# Patient Record
Sex: FEMALE | Race: WHITE | NOT HISPANIC OR LATINO | Employment: OTHER | URBAN - METROPOLITAN AREA
[De-identification: names, ages, dates, MRNs, and addresses within clinical notes are randomized per-mention and may not be internally consistent; named-entity substitution may affect disease eponyms.]

---

## 2017-01-03 DIAGNOSIS — E03.9 HYPOTHYROIDISM, UNSPECIFIED TYPE: ICD-10-CM

## 2017-01-03 LAB
T4 FREE SERPL-MCNC: 1.05 NG/DL (ref 0.76–1.46)
TSH SERPL DL<=0.005 MIU/L-ACNC: 5.85 MU/L (ref 0.4–4)

## 2017-01-03 PROCEDURE — 36415 COLL VENOUS BLD VENIPUNCTURE: CPT | Performed by: NURSE PRACTITIONER

## 2017-01-03 PROCEDURE — 84443 ASSAY THYROID STIM HORMONE: CPT | Performed by: NURSE PRACTITIONER

## 2017-01-03 PROCEDURE — 84439 ASSAY OF FREE THYROXINE: CPT | Performed by: NURSE PRACTITIONER

## 2017-01-03 NOTE — Clinical Note
[facility]  [address] [place] [address]                  [phone]   January 5, 2017    Vanessa Mckeon  [address]  [place] [address]      Vanessa    Your thyroid results show a marked improvement, but still not within a therapeutic range. I would recommend we increase the dose of your synthroid dose once again and recheck in another 6-8 wks. I have faxed over a new prescription to your pharmacy and I'd like you to schedule a lab only in about 6-8 wks. You can schedule a nonfasting lab only appointment any day of the week except Sundays.  Call our  at [phone] to set up this appointment.    Please contact me if you have any questions,  Tatiana Leahy CNP        Results for orders placed or performed in visit on 01/03/17   TSH with free T4 reflex   Result Value Ref Range    TSH 5.85 (H) 0.40 - 4.00 mU/L   T4 free   Result Value Ref Range    T4 Free 1.05 0.76 - 1.46 ng/dL

## 2017-01-05 RX ORDER — LEVOTHYROXINE SODIUM 75 UG/1
75 TABLET ORAL DAILY
Qty: 60 TABLET | Refills: 0 | Status: SHIPPED | OUTPATIENT
Start: 2017-01-05 | End: 2017-03-07

## 2017-01-19 ENCOUNTER — TRANSFERRED RECORDS (OUTPATIENT)
Dept: HEALTH INFORMATION MANAGEMENT | Facility: CLINIC | Age: 62
End: 2017-01-19

## 2017-02-03 ENCOUNTER — TELEPHONE (OUTPATIENT)
Dept: PEDIATRICS | Facility: CLINIC | Age: 62
End: 2017-02-03

## 2017-02-03 DIAGNOSIS — M25.562 LEFT KNEE PAIN, UNSPECIFIED CHRONICITY: Primary | ICD-10-CM

## 2017-02-03 NOTE — TELEPHONE ENCOUNTER
Patient called back I gave her the phone number off of the referral, and she said she will call them

## 2017-02-03 NOTE — TELEPHONE ENCOUNTER
Patient asking for referral to see specialist for water on the knee that she feels happened as a result of a fall a few months ago, advise if she should be seen in clinic first or ok with referral to specialist,sasha.

## 2017-02-14 ENCOUNTER — OFFICE VISIT (OUTPATIENT)
Dept: ORTHOPEDICS | Facility: CLINIC | Age: 62
End: 2017-02-14
Payer: OTHER GOVERNMENT

## 2017-02-14 ENCOUNTER — RADIANT APPOINTMENT (OUTPATIENT)
Dept: GENERAL RADIOLOGY | Facility: CLINIC | Age: 62
End: 2017-02-14
Attending: PHYSICAL MEDICINE & REHABILITATION
Payer: OTHER GOVERNMENT

## 2017-02-14 VITALS
HEIGHT: 62 IN | SYSTOLIC BLOOD PRESSURE: 128 MMHG | WEIGHT: 187 LBS | DIASTOLIC BLOOD PRESSURE: 84 MMHG | BODY MASS INDEX: 34.41 KG/M2

## 2017-02-14 DIAGNOSIS — M17.11 PRIMARY OSTEOARTHRITIS OF RIGHT KNEE: ICD-10-CM

## 2017-02-14 DIAGNOSIS — E66.9 NON MORBID OBESITY, UNSPECIFIED OBESITY TYPE: ICD-10-CM

## 2017-02-14 DIAGNOSIS — M70.41 PREPATELLAR BURSITIS OF RIGHT KNEE: Primary | ICD-10-CM

## 2017-02-14 DIAGNOSIS — M25.561 ARTHRALGIA OF RIGHT LOWER LEG: ICD-10-CM

## 2017-02-14 PROCEDURE — 73562 X-RAY EXAM OF KNEE 3: CPT | Mod: RT | Performed by: PHYSICAL MEDICINE & REHABILITATION

## 2017-02-14 PROCEDURE — 99243 OFF/OP CNSLTJ NEW/EST LOW 30: CPT | Performed by: PHYSICAL MEDICINE & REHABILITATION

## 2017-02-14 NOTE — PROGRESS NOTES
[facility]   Clinic Visit s Feb 14, 2017    Subjective:  Vanessa Mckeon is a 61 year old female who is seen in consultation at the request of Ms. Leahy for evaluation of right knee pain/swelling.    Symptoms began 1 month ago.  Describes injury as occurring from falling in November 2016 injuring her right ankle at that time.  Reports right knee pain that is located anterior with radiation absent.  Pain is 3/10 in maximal severity and 0/10 currently.  Symptoms are worse with prolonged standing, sitting activities, and with getting out of her car and better with rest and elevation.  Other treatment has consisted of ice and Aleve with good relief.  Denies any weakness, locking, popping, catching, clicking, or bruising of the right knee.  Notes swelling of the right knee. Denies any fevers or chills. Denies any redness or warmth of the right knee.  Denies any previous right knee injuries/surgeries.      Patient's past medical, surgical, social, and family histories are reviewed today.  Significant medical history includes obesity  Past Medical History   Diagnosis Date     Anemia      Benign neoplasm of colon      Gastro-oesophageal reflux disease      Hypertrophy of breast      fibrous left breast- benign     Malignant neoplasm (H)      Pure hypercholesterolemia      Skin cancer        Review of Systems:  Constitutional: NEGATIVE for fever, chills, or change in weight  Skin: NEGATIVE for worrisome rashes, moles, or lesions  Neuro: NEGATIVE for weakness of the right knee  MSK: see HPI  Additional 10 point ROS is negative other than symptoms noted above and in HPI    This document serves as a record of the services and decisions personally performed and made by Chaz Singleton DO. It was created on his behalf by Zbigniew Edwards, a trained medical scribe. The creation of this document is based on the provider's statements to the medical scribe.  Zbigniew Edwards February 14, 2017 8:53 AM   "    Objective:  /84 (BP Location: Right arm, Patient Position: Chair, Cuff Size: Adult Regular)  Ht 1.575 m (5' 2\")  Wt 84.8 kg (187 lb)  LMP 05/19/2008  BMI 34.2 kg/m2  General: healthy, alert, obese, and in no distress  Skin: no suspicious lesions or rashes  Psych: mentation appears normal and affect normal/bright  HEENT: no scleral icterus  CV: no pedal edema  Resp: normal respiratory effort without conversational dyspnea   Neuro: motor strength as noted below  Lymph: no palpable lymphadenopathy    MSK:    RIGHT KNEE  Inspection:     Swelling present without erythema or ecchymosis  Palpation:    Not tender over the patellar tendon, distal quadriceps insertion, prepatellar bursa, medial joint line, lateral joint line, medial tibial plateau, lateral tibial plateau, medial femoral condyle, lateral femoral condyle, or pes bursa    Patellofemoral crepitus is present  Active Range of Motion:     00 extension to 1250 flexion  Strength:    Quadriceps: 5/5     Extensor mechanism intact  Special Tests:    Positive: Ballottement    Negative: Patellar grind, patellar apprehension, quadriceps active test , Zen's, bounce home, Lachman's, MCL/valgus stress (0 & 30 deg), and LCL/varus stress (0 & 30 deg)    Imaging:  Right knee x-rays (3 views, Persaud/sunrise/lateral) were ordered, independent visualization of images was performed, and interpreted in the office today  Impression:  1. Tricompartmental osteoarthrosis noted bilaterally with osteophytes present.  2. Ossifications noted lateral to the lateral femoral condyle of the left knee of unknown significance.  3. Soft tissue swelling noted anterior to the right patella, possibly related to prepatellar bursitis.  4. Enthesophyte noted of the superior portion of the right patella.  5. Probable joint effusion present of the right knee.  6. Negative for fracture, subluxation, or other acute osseous abnormalities.    ASSESSMENT:  1. Prepatellar bursitis of the right " knee  2. Primary right knee osteoarthrosis  3. Non morbid obesity    PLAN:  1. Acetaminophen/Ibuprofen/ice as needed for improved pain control.  2. Activity modification as discussed, including limitation of activities that cause pain/discomfort.  3. Discussed importance of weight loss, including decreased oral intake and increased physical activity including aqua therapy, biking activities as tolerated, etc.  4. Call if interested in a spider pad knee brace and/or right knee prepatellar bursa aspiration/corticosteroid injection with ultrasound guidance for further treatment purposes.  5. Follow-up as needed for further evaluation/medical care.  Instructed to follow-up if change of symptoms arise.  Consider right knee intra-articular aspiration/corticosteroid injection with ultrasound guidance, referral to orthopedic surgery, etc as deemed appropriate moving forward.  Instructed to contact our office should the condition evolve or worsen.    Patient's conditions were thoroughly discussed during today's visit with greater than 50% of the visit spent counseling the patient with total time spent face-to-face with the patient being 15 minutes.    Disclaimer: This note consists of symbols derived from keyboarding, dictation and/or voice recognition software. As a result, there may be errors in the script that have gone undetected. Please consider this when interpreting information found in this chart.    The information in this document, created by a scribe for me, accurately reflects the services I personally performed and the decisions made by me.  I have reviewed and approved this document for accuracy.    Chaz Singleton DO, CAM  [facility]

## 2017-02-14 NOTE — PATIENT INSTRUCTIONS
We addressed the following today:    1. Prepatellar right knee bursitis  2. Right knee arthritis  3. Obesity    Activity modification as discussed    Topical Treatments: Ice    Over the counter medication: Acetaminophen (Tylenol) 1000 mg every 6 hours with food (Maximum of 3000 mg/day)    Ibuprofen (Advil) maximum of 800 mg four times a day with food    Discussed importance of weight loss, including decreased oral intake and increased physical activity including aqua therapy, biking activities as tolerated, etc    Call if interested in a spider pad knee brace and/or right knee aspiration/corticosteroid injection with ultrasound guidance for further treatment purposes    Follow-up as needed for further evaluation/medical care (call direct clinic number [[phone]] at any time with questions or concerns)

## 2017-02-14 NOTE — NURSING NOTE
"Chief Complaint   Patient presents with     Knee Pain     Right Knee       Initial /84  Ht 5' 2\" (1.575 m)  Wt 187 lb (84.8 kg)  LMP 05/19/2008  BMI 34.2 kg/m2 Estimated body mass index is 34.2 kg/(m^2) as calculated from the following:    Height as of this encounter: 5' 2\" (1.575 m).    Weight as of this encounter: 187 lb (84.8 kg).  Medication Reconciliation: complete     Monica Steen, ATC      "

## 2017-02-14 NOTE — MR AVS SNAPSHOT
After Visit Summary   2/14/2017    Vanessa Mckeon    MRN: [id number]           Patient Information     Date Of Birth          1955        Visit Information        Provider Department      2/14/2017 8:40 AM Chaz Singleton DO [facility]        Today's Diagnoses     Prepatellar bursitis of right knee    -  1    Primary osteoarthritis of right knee        Non morbid obesity, unspecified obesity type          Care Instructions    We addressed the following today:    1. Prepatellar bursitis  2. Right knee arthritis  2. Obesity    Activity modification as discussed    Topical Treatments: Ice    Over the counter medication: Acetaminophen (Tylenol) 1000 mg every 6 hours with food (Maximum of 3000 mg/day)    Ibuprofen (Advil) maximum of 800 mg four times a day with food    Discussed importance of weight loss, including decreased oral intake and increased physical activity including aqua therapy, biking activities as tolerated, etc    Call if interested in a spider pad knee brace and/or right knee aspiration/corticosteroid injection with ultrasound guidance for further treatment purposes    Follow-up as needed for further evaluation/medical care (call direct clinic number [[phone]] at any time with questions or concerns)        Follow-ups after your visit        Who to contact     If you have questions or need follow up information about today's clinic visit or your schedule please contact [facility] directly at [phone].  Normal or non-critical lab and imaging results will be communicated to you by MyChart, letter or phone within 4 business days after the clinic has received the results. If you do not hear from us within 7 days, please contact the clinic through MyChart or phone. If you have a critical or abnormal lab result, we will notify you by phone as soon as possible.  Submit refill requests through [employer] or call your pharmacy and they will  "forward the refill request to us. Please allow 3 business days for your refill to be completed.          Additional Information About Your Visit        [employer] Information     [employer] lets you send messages to your doctor, view your test results, renew your prescriptions, schedule appointments and more. To sign up, go to [email or web address] . Click on \"Log in\" on the left side of the screen, which will take you to the Welcome page. Then click on \"Sign up Now\" on the right side of the page.     You will be asked to enter the access code listed below, as well as some personal information. Please follow the directions to create your username and password.     Your access code is: [email or web address]  Expires: 5/15/2017  9:19 AM     Your access code will  in 90 days. If you need help or a new code, please call your [facility] clinic or [phone].        Care EveryWhere ID     This is your Care EveryWhere ID. This could be used by other organizations to access your [facility] medical records  [id number]        Your Vitals Were     Height Last Period BMI (Body Mass Index)             1.575 m (5' 2\") 2008 34.2 kg/m2          Blood Pressure from Last 3 Encounters:   17 128/84   16 122/66   16 118/78    Weight from Last 3 Encounters:   17 84.8 kg (187 lb)   16 85.1 kg (187 lb 9.6 oz)   16 84.6 kg (186 lb 9.6 oz)               Primary Care Provider Office Phone # Fax #    LENARD Soria [phone]       [facility] [address] [place] [address]        Thank you!     Thank you for choosing [facility]  for your care. Our goal is always to provide you with excellent care. Hearing back from our patients is one way we can continue to improve our services. Please take a few minutes to complete the written survey that you may receive in the mail after your visit with us. Thank you!             Your Updated Medication List " - Protect others around you: Learn how to safely use, store and throw away your medicines at www.disposemymeds.org.          This list is accurate as of: 2/14/17  9:19 AM.  Always use your most recent med list.                   Brand Name Dispense Instructions for use    cyclobenzaprine 5 MG tablet    FLEXERIL    30 tablet    Take 1 tablet (5 mg) by mouth 3 times daily as needed       fluticasone 50 MCG/ACT spray    FLONASE    1 Package    Spray 2 sprays into both nostrils daily       * levothyroxine 50 MCG tablet    SYNTHROID/LEVOTHROID    60 tablet    Take 1 tablet (50 mcg) by mouth daily       * levothyroxine 75 MCG tablet    SYNTHROID/LEVOTHROID    60 tablet    Take 1 tablet (75 mcg) by mouth daily       MULTIVITAMIN PO      Take  by mouth.       omeprazole 20 MG CR capsule    priLOSEC    90 capsule    Take 1 capsule (20 mg) by mouth daily       order for DME     1 each    Equipment being ordered: ankle brace       * Notice:  This list has 2 medication(s) that are the same as other medications prescribed for you. Read the directions carefully, and ask your doctor or other care provider to review them with you.

## 2017-02-14 NOTE — Clinical Note
Dear Vanessa Baptiste saw me at [facility] on Feb 14, 2017.  Please refer to the visit note at your convenience and feel free to contact me should you have any questions.  Sincerely,  Chaz Singleton DO, [facility]

## 2017-02-28 ENCOUNTER — TRANSFERRED RECORDS (OUTPATIENT)
Dept: HEALTH INFORMATION MANAGEMENT | Facility: CLINIC | Age: 62
End: 2017-02-28

## 2017-03-01 DIAGNOSIS — E03.9 HYPOTHYROIDISM, UNSPECIFIED TYPE: ICD-10-CM

## 2017-03-01 LAB — TSH SERPL DL<=0.005 MIU/L-ACNC: 2.97 MU/L (ref 0.4–4)

## 2017-03-01 PROCEDURE — 36415 COLL VENOUS BLD VENIPUNCTURE: CPT | Performed by: NURSE PRACTITIONER

## 2017-03-01 PROCEDURE — 84443 ASSAY THYROID STIM HORMONE: CPT | Performed by: NURSE PRACTITIONER

## 2017-03-07 DIAGNOSIS — E03.9 HYPOTHYROIDISM, UNSPECIFIED TYPE: ICD-10-CM

## 2017-03-07 RX ORDER — LEVOTHYROXINE SODIUM 75 UG/1
75 TABLET ORAL DAILY
Qty: 90 TABLET | Refills: 2 | Status: SHIPPED | OUTPATIENT
Start: 2017-03-07 | End: 2017-12-29

## 2017-03-07 NOTE — TELEPHONE ENCOUNTER
Prescription approved per [facility] Refill Protocol.  Jo Ann Brown PharmD   [facility]  [email or web address]   [facility] pharmacist on behalf of [facility].

## 2017-03-07 NOTE — TELEPHONE ENCOUNTER
Levothyroxine 75mcg     Last Written Prescription Date: 1/5/17  Last Quantity: 60, # refills: 0  Last Office Visit with [facility], [facility] or [facility] prescribing provider: 11/28/16        TSH   Date Value Ref Range Status   03/01/2017 2.97 0.40 - 4.00 mU/L Final       Thank You,  Angelica Lackey, [facility]

## 2017-05-22 DIAGNOSIS — K21.9 GASTROESOPHAGEAL REFLUX DISEASE WITHOUT ESOPHAGITIS: ICD-10-CM

## 2017-05-22 NOTE — TELEPHONE ENCOUNTER
OMEPRAZOLE 20MG      Last Written Prescription Date: 4/22/2016  Last Fill Quantity: 90,  # refills: 3   Last Office Visit with [facility], [facility] or [facility] prescribing provider: 11/28/2016

## 2017-05-23 NOTE — TELEPHONE ENCOUNTER
Prescription approved per [facility] Refill Protocol.    Mariel Lozano RN

## 2017-07-12 ENCOUNTER — OFFICE VISIT (OUTPATIENT)
Dept: URGENT CARE | Facility: URGENT CARE | Age: 62
End: 2017-07-12
Payer: OTHER GOVERNMENT

## 2017-07-12 VITALS
SYSTOLIC BLOOD PRESSURE: 137 MMHG | TEMPERATURE: 97.9 F | DIASTOLIC BLOOD PRESSURE: 85 MMHG | BODY MASS INDEX: 33.73 KG/M2 | WEIGHT: 184.4 LBS | HEART RATE: 87 BPM

## 2017-07-12 DIAGNOSIS — R30.0 DYSURIA: Primary | ICD-10-CM

## 2017-07-12 LAB
ALBUMIN UR-MCNC: NEGATIVE MG/DL
APPEARANCE UR: CLEAR
BACTERIA #/AREA URNS HPF: ABNORMAL /HPF
BILIRUB UR QL STRIP: NEGATIVE
COLOR UR AUTO: YELLOW
GLUCOSE UR STRIP-MCNC: NEGATIVE MG/DL
HGB UR QL STRIP: NEGATIVE
KETONES UR STRIP-MCNC: NEGATIVE MG/DL
LEUKOCYTE ESTERASE UR QL STRIP: ABNORMAL
NITRATE UR QL: NEGATIVE
NON-SQ EPI CELLS #/AREA URNS LPF: ABNORMAL /LPF
PH UR STRIP: 5.5 PH (ref 5–7)
RBC #/AREA URNS AUTO: ABNORMAL /HPF (ref 0–2)
SP GR UR STRIP: 1.01 (ref 1–1.03)
URN SPEC COLLECT METH UR: ABNORMAL
UROBILINOGEN UR STRIP-ACNC: 0.2 EU/DL (ref 0.2–1)
WBC #/AREA URNS AUTO: ABNORMAL /HPF (ref 0–2)

## 2017-07-12 PROCEDURE — 99213 OFFICE O/P EST LOW 20 MIN: CPT | Performed by: FAMILY MEDICINE

## 2017-07-12 PROCEDURE — 81001 URINALYSIS AUTO W/SCOPE: CPT | Performed by: FAMILY MEDICINE

## 2017-07-12 PROCEDURE — 87086 URINE CULTURE/COLONY COUNT: CPT | Performed by: FAMILY MEDICINE

## 2017-07-12 RX ORDER — SULFAMETHOXAZOLE/TRIMETHOPRIM 800-160 MG
1 TABLET ORAL 2 TIMES DAILY
Qty: 14 TABLET | Refills: 0 | Status: SHIPPED | OUTPATIENT
Start: 2017-07-12 | End: 2017-07-19

## 2017-07-12 NOTE — MR AVS SNAPSHOT
After Visit Summary   7/12/2017    Vanessa Mckeon    MRN: [id number]           Patient Information     Date Of Birth          1955        Visit Information        Provider Department      7/12/2017 9:35 AM Yrn Sparks MD [facility]        Today's Diagnoses     Dysuria    -  1      Care Instructions    Take full course of antibiotic for bladder infection/early kidney infection.    We will contact you if the urine culture results shows that a different antibiotic is needed for treatment.      Urinary Tract Infections in Women    Urinary tract infections (UTIs) are most often caused by bacteria (germs). These bacteria enter the urinary tract. The bacteria may come from outside the body. Or they may travel from the skin outside the rectum or vagina into the urethra. Female anatomy makes it easy for bacteria from the bowel to enter a woman s urinary tract, which is the most common source of UTI. This means women develop UTIs more often than men. Pain in or around the urinary tract is a common UTI symptom. But the only way to know for sure if you have a UTI for the healthcare provider to test your urine. The two tests that may be done are the urinalysis and urine culture.  Types of UTIs    Cystitis: A bladder infection (cystitis) is the most common UTI in women. You may have urgent or frequent urination. You may also have pain, burning when you urinate, and bloody urine.    Urethritis: This is an inflamed urethra, which is the tube that carries urine from the bladder to outside the body. You may have lower stomach or back pain. You may also have urgent or frequent urination.    Pyelonephritis: This is a kidney infection. If not treated, it can be serious and damage your kidneys. In severe cases, you may be hospitalized. You may have a fever and lower back pain.  Medicines to treat a UTI  Most UTIs are treated with antibiotics. These kill the bacteria. The length of time you need to  take them depends on the type of infection. It may be as short as 3 days. If you have repeated UTIs, a low-dose antibiotic may be needed for several months. Take antibiotics exactly as directed. Don t stop taking them until all of the medicine is gone. If you stop taking the antibiotic too soon, the infection may not go away, and you may develop a resistance to the antibiotic. This can make it much harder to treat.  Lifestyle changes to treat and prevent UTIs  The lifestyle changes below will help get rid of your UTI. They may also help prevent future UTIs.    Drink plenty of fluids. This includes water, juice, or other caffeine-free drinks. Fluids help flush bacteria out of your body.    Empty your bladder. Always empty your bladder when you feel the urge to urinate. And always urinate before going to sleep. Urine that stays in your bladder can lead to infection. Try to urinate before and after sex as well.    Practice good personal hygiene. Wipe yourself from front to back after using the toilet. This helps keep bacteria from getting into the urethra.    Use condoms during sex. These help prevent UTIs caused by sexually transmitted bacteria. Also, avoid using spermicides during sex. These can increase the risk of UTIs. Choose other forms of birth control instead. For women who tend to get UTIs after sex, a low-dose of a preventive antibiotic may be used. Be sure to discuss this option with your healthcare provider.    Follow up with your healthcare provider as directed. He or she may test to make sure the infection has cleared. If needed, more treatment may be started.  Date Last Reviewed: 1/1/2017 2000-2017 [employer]. [address] [place] [address], [place] [address]. All rights reserved. This information is not intended as a substitute for professional medical care. Always follow your healthcare professional's instructions.                Follow-ups after your visit        Who to contact     If you  "have questions or need follow up information about today's clinic visit or your schedule please contact [facility] directly at [phone].  Normal or non-critical lab and imaging results will be communicated to you by MyChart, letter or phone within 4 business days after the clinic has received the results. If you do not hear from us within 7 days, please contact the clinic through [employer] or phone. If you have a critical or abnormal lab result, we will notify you by phone as soon as possible.  Submit refill requests through [employer] or call your pharmacy and they will forward the refill request to us. Please allow 3 business days for your refill to be completed.          Additional Information About Your Visit        [employer] Information     [employer] lets you send messages to your doctor, view your test results, renew your prescriptions, schedule appointments and more. To sign up, go to [email or web address] . Click on \"Log in\" on the left side of the screen, which will take you to the Welcome page. Then click on \"Sign up Now\" on the right side of the page.     You will be asked to enter the access code listed below, as well as some personal information. Please follow the directions to create your username and password.     Your access code is: [email or web address]  Expires: 10/10/2017 10:07 AM     Your access code will  in 90 days. If you need help or a new code, please call your [facility] [place] clinic or [phone].        Care EveryWhere ID     This is your Care EveryWhere ID. This could be used by other organizations to access your [facility] medical records  [id number]        Your Vitals Were     Pulse Temperature Last Period BMI (Body Mass Index)          87 97.9  F (36.6  C) (Tympanic) 2008 33.73 kg/m2         Blood Pressure from Last 3 Encounters:   17 137/85   17 128/84   16 122/66    Weight from Last 3 Encounters:   17 184 lb 6.4 oz (83.6 kg)   17 187 lb (84.8 " kg)   11/28/16 187 lb 9.6 oz (85.1 kg)              We Performed the Following     UA reflex to Microscopic and Culture     Urine Culture Aerobic Bacterial     Urine Microscopic          Today's Medication Changes          These changes are accurate as of: 7/12/17 10:07 AM.  If you have any questions, ask your nurse or doctor.               Start taking these medicines.        Dose/Directions    sulfamethoxazole-trimethoprim 800-160 MG per tablet   Commonly known as:  BACTRIM DS/SEPTRA DS   Used for:  Dysuria   Started by:  Yrn Sparks MD        Dose:  1 tablet   Take 1 tablet by mouth 2 times daily for 7 days   Quantity:  14 tablet   Refills:  0            Where to get your medicines      These medications were sent to [facility] - [address]   [address] [place], [place] [address]     Phone:  [phone]     sulfamethoxazole-trimethoprim 800-160 MG per tablet                Primary Care Provider Office Phone # Fax #    LENARD Soria [phone]       [facility] [address] [place] [address] [address] [place] [address]        Equal Access to Services     [facility]: Hadii aad ku hadasho Soomaali, waaxda luqadaha, qaybta kaalmada adeegyada, waxvianney carrasco . So [facility] [place] [phone].    ATENCIÓN: Si habla español, tiene a persaud disposición servicios gratuitos de asistencia lingüística. Arabella al [phone].    We comply with applicable federal civil rights laws and [place] laws. We do not discriminate on the basis of race, color, national origin, age, disability sex, sexual orientation or gender identity.            Thank you!     Thank you for choosing [facility]  for your care. Our goal is always to provide you with excellent care. Hearing back from our patients is one way we can continue to improve our services. Please take a few minutes to complete the written survey that you may  receive in the mail after your visit with us. Thank you!             Your Updated Medication List - Protect others around you: Learn how to safely use, store and throw away your medicines at www.disposemymeds.org.          This list is accurate as of: 7/12/17 10:07 AM.  Always use your most recent med list.                   Brand Name Dispense Instructions for use Diagnosis    fluticasone 50 MCG/ACT spray    FLONASE    1 Package    Spray 2 sprays into both nostrils daily    Rhinitis       levothyroxine 75 MCG tablet    SYNTHROID/LEVOTHROID    90 tablet    Take 1 tablet (75 mcg) by mouth daily    Hypothyroidism, unspecified type       MULTIVITAMIN PO      Take  by mouth.        omeprazole 20 MG CR capsule    priLOSEC    90 capsule    TAKE ONE CAPSULE BY MOUTH EVERY DAY    Gastroesophageal reflux disease without esophagitis       sulfamethoxazole-trimethoprim 800-160 MG per tablet    BACTRIM DS/SEPTRA DS    14 tablet    Take 1 tablet by mouth 2 times daily for 7 days    Dysuria

## 2017-07-12 NOTE — PATIENT INSTRUCTIONS
Take full course of antibiotic for bladder infection/early kidney infection.    We will contact you if the urine culture results shows that a different antibiotic is needed for treatment.      Urinary Tract Infections in Women    Urinary tract infections (UTIs) are most often caused by bacteria (germs). These bacteria enter the urinary tract. The bacteria may come from outside the body. Or they may travel from the skin outside the rectum or vagina into the urethra. Female anatomy makes it easy for bacteria from the bowel to enter a woman s urinary tract, which is the most common source of UTI. This means women develop UTIs more often than men. Pain in or around the urinary tract is a common UTI symptom. But the only way to know for sure if you have a UTI for the healthcare provider to test your urine. The two tests that may be done are the urinalysis and urine culture.  Types of UTIs    Cystitis: A bladder infection (cystitis) is the most common UTI in women. You may have urgent or frequent urination. You may also have pain, burning when you urinate, and bloody urine.    Urethritis: This is an inflamed urethra, which is the tube that carries urine from the bladder to outside the body. You may have lower stomach or back pain. You may also have urgent or frequent urination.    Pyelonephritis: This is a kidney infection. If not treated, it can be serious and damage your kidneys. In severe cases, you may be hospitalized. You may have a fever and lower back pain.  Medicines to treat a UTI  Most UTIs are treated with antibiotics. These kill the bacteria. The length of time you need to take them depends on the type of infection. It may be as short as 3 days. If you have repeated UTIs, a low-dose antibiotic may be needed for several months. Take antibiotics exactly as directed. Don t stop taking them until all of the medicine is gone. If you stop taking the antibiotic too soon, the infection may not go away, and you may  develop a resistance to the antibiotic. This can make it much harder to treat.  Lifestyle changes to treat and prevent UTIs  The lifestyle changes below will help get rid of your UTI. They may also help prevent future UTIs.    Drink plenty of fluids. This includes water, juice, or other caffeine-free drinks. Fluids help flush bacteria out of your body.    Empty your bladder. Always empty your bladder when you feel the urge to urinate. And always urinate before going to sleep. Urine that stays in your bladder can lead to infection. Try to urinate before and after sex as well.    Practice good personal hygiene. Wipe yourself from front to back after using the toilet. This helps keep bacteria from getting into the urethra.    Use condoms during sex. These help prevent UTIs caused by sexually transmitted bacteria. Also, avoid using spermicides during sex. These can increase the risk of UTIs. Choose other forms of birth control instead. For women who tend to get UTIs after sex, a low-dose of a preventive antibiotic may be used. Be sure to discuss this option with your healthcare provider.    Follow up with your healthcare provider as directed. He or she may test to make sure the infection has cleared. If needed, more treatment may be started.  Date Last Reviewed: 1/1/2017 2000-2017 [employer]. [address] [place] [address], [place] [address]. All rights reserved. This information is not intended as a substitute for professional medical care. Always follow your healthcare professional's instructions.

## 2017-07-12 NOTE — NURSING NOTE
"Chief Complaint   Patient presents with     Urgent Care     Urinary Problem     Started 2 days ago with right sided back pain and into right side of groin.       Initial /85  Pulse 87  Temp 97.9  F (36.6  C) (Tympanic)  Wt 184 lb 6.4 oz (83.6 kg)  LMP 05/19/2008  BMI 33.73 kg/m2 Estimated body mass index is 33.73 kg/(m^2) as calculated from the following:    Height as of 2/14/17: 5' 2\" (1.575 m).    Weight as of this encounter: 184 lb 6.4 oz (83.6 kg).  Medication Reconciliation: complete    "

## 2017-07-12 NOTE — PROGRESS NOTES
SUBJECTIVE:   Vanessa Mckeon is a 61 year old female who  presents today for a possible UTI. Symptoms of right sided back pain have been going on for 2day(s), shooting pain intermittently in groin area.  Positive for urinary frequency and urgency.  Hematuria no.  sudden onset and worsening.  There is no history of fever, nausea or vomiting.  Endorsed chills, no appetite.  No history of vaginal discharge, no concerns for STD. This patient does not have a history of urinary tract infections. Patient denies long duration, rigors and temperature > 101 degrees F.  Patient denies any history of kidney stones.     Past Medical History:   Diagnosis Date     Anemia      Benign neoplasm of colon      Gastro-oesophageal reflux disease      Hypertrophy of breast     fibrous left breast- benign     Malignant neoplasm (H)      Pure hypercholesterolemia      Skin cancer      Current Outpatient Prescriptions   Medication Sig Dispense Refill     omeprazole (PRILOSEC) 20 MG CR capsule TAKE ONE CAPSULE BY MOUTH EVERY DAY 90 capsule 0     levothyroxine (SYNTHROID/LEVOTHROID) 75 MCG tablet Take 1 tablet (75 mcg) by mouth daily 90 tablet 2     fluticasone (FLONASE) 50 MCG/ACT nasal spray Spray 2 sprays into both nostrils daily 1 Package 2     Multiple Vitamins-Minerals (MULTIVITAMIN OR) Take  by mouth.       [DISCONTINUED] levothyroxine (SYNTHROID, LEVOTHROID) 50 MCG tablet Take 1 tablet (50 mcg) by mouth daily 60 tablet 0     Social History   Substance Use Topics     Smoking status: Former Smoker     Years: 10.00     Quit date: 4/1/2003     Smokeless tobacco: Never Used     Alcohol use Yes      Comment: rarely       ROS:   CONSTITUTIONAL:NEGATIVE for fever, chills, change in weight and POSITIVE  for chills  INTEGUMENTARY/SKIN: NEGATIVE for worrisome rashes, moles or lesions  ENT/MOUTH: NEGATIVE for ear, mouth and throat problems  RESP:NEGATIVE for significant cough or SOB  CV: NEGATIVE for chest pain, palpitations or peripheral  edema  GI: NEGATIVE for nausea or change in bowel habits and POSITIVE for Hx GERD and poor appetite and right groin pain  : POSITIVE for frequency     OBJECTIVE:  /85  Pulse 87  Temp 97.9  F (36.6  C) (Tympanic)  Wt 184 lb 6.4 oz (83.6 kg)  LMP 05/19/2008  BMI 33.73 kg/m2  GENERAL APPEARANCE: healthy, alert and no distress  RESP: lungs clear to auscultation - no rales, rhonchi or wheezes  CV: regular rates and rhythm, normal S1 S2, no murmur noted  ABDOMEN:  soft, very mild tenderness in right RLQ, no rebound, no guarding, no HSM or masses and bowel sounds normal  BACK: No CVA tenderness  SKIN: no suspicious lesions or rashes  PSYCH: mentation appears normal and affect normal/bright    Results for orders placed or performed in visit on 07/12/17   UA reflex to Microscopic and Culture   Result Value Ref Range    Color Urine Yellow     Appearance Urine Clear     Glucose Urine Negative NEG mg/dL    Bilirubin Urine Negative NEG    Ketones Urine Negative NEG mg/dL    Specific Gravity Urine 1.015 1.003 - 1.035    Blood Urine Negative NEG    pH Urine 5.5 5.0 - 7.0 pH    Protein Albumin Urine Negative NEG mg/dL    Urobilinogen Urine 0.2 0.2 - 1.0 EU/dL    Nitrite Urine Negative NEG    Leukocyte Esterase Urine Trace (A) NEG    Source Midstream Urine    Urine Microscopic   Result Value Ref Range    WBC Urine 5-10 (A) 0 - 2 /HPF    RBC Urine O - 2 0 - 2 /HPF    Squamous Epithelial /LPF Urine Few FEW /LPF    Bacteria Urine Few (A) NEG /HPF       ASSESSMENT/PLAN:   (R30.0) Dysuria  (primary encounter diagnosis)  Comment: UTI vs early pyelo  Plan: UA reflex to Microscopic and Culture, Urine         Microscopic, Urine Culture Aerobic Bacterial,         sulfamethoxazole-trimethoprim (BACTRIM         DS/SEPTRA DS) 800-160 MG per tablet            Will empirically treat for presumptive UTI/early pyelo right, patient appears well and non-toxic.  RX Bactrim given, will follow up on urine culture and adjust medication if  needed.  Drink plenty of fluids.  Prevention and treatment of UTI's discussed.Signs and symptoms of pyelonephritis mentioned.      Return to clinic if no resolution of symptoms.    Yrn Sparks MD  July 12, 2017 10:14 AM

## 2017-07-13 LAB
BACTERIA SPEC CULT: NO GROWTH
MICRO REPORT STATUS: NORMAL
SPECIMEN SOURCE: NORMAL

## 2017-08-24 DIAGNOSIS — K21.9 GASTROESOPHAGEAL REFLUX DISEASE WITHOUT ESOPHAGITIS: ICD-10-CM

## 2017-08-25 NOTE — TELEPHONE ENCOUNTER
Omeprazole:  Patient is due for an annual physical this fall, refilled x 1.  Note sent to pharmacy to notify patient.    Marta Villalobos RN  Triage Nurse

## 2017-10-02 ENCOUNTER — OFFICE VISIT (OUTPATIENT)
Dept: PEDIATRICS | Facility: CLINIC | Age: 62
End: 2017-10-02
Payer: OTHER GOVERNMENT

## 2017-10-02 VITALS
TEMPERATURE: 97.3 F | HEART RATE: 85 BPM | SYSTOLIC BLOOD PRESSURE: 128 MMHG | DIASTOLIC BLOOD PRESSURE: 84 MMHG | WEIGHT: 184.4 LBS | BODY MASS INDEX: 33.73 KG/M2

## 2017-10-02 DIAGNOSIS — D50.9 IRON DEFICIENCY ANEMIA, UNSPECIFIED IRON DEFICIENCY ANEMIA TYPE: ICD-10-CM

## 2017-10-02 DIAGNOSIS — E03.9 HYPOTHYROIDISM, UNSPECIFIED TYPE: ICD-10-CM

## 2017-10-02 DIAGNOSIS — Z00.00 ROUTINE GENERAL MEDICAL EXAMINATION AT A HEALTH CARE FACILITY: Primary | ICD-10-CM

## 2017-10-02 DIAGNOSIS — K21.9 GASTROESOPHAGEAL REFLUX DISEASE WITHOUT ESOPHAGITIS: ICD-10-CM

## 2017-10-02 DIAGNOSIS — E78.5 HYPERLIPIDEMIA LDL GOAL <160: ICD-10-CM

## 2017-10-02 LAB
CHOLEST SERPL-MCNC: 254 MG/DL
ERYTHROCYTE [DISTWIDTH] IN BLOOD BY AUTOMATED COUNT: 19.7 % (ref 10–15)
HCT VFR BLD AUTO: 36.3 % (ref 35–47)
HDLC SERPL-MCNC: 66 MG/DL
HGB BLD-MCNC: 11.3 G/DL (ref 11.7–15.7)
LDLC SERPL CALC-MCNC: 171 MG/DL
MCH RBC QN AUTO: 23.8 PG (ref 26.5–33)
MCHC RBC AUTO-ENTMCNC: 31.1 G/DL (ref 31.5–36.5)
MCV RBC AUTO: 76 FL (ref 78–100)
NONHDLC SERPL-MCNC: 188 MG/DL
PLATELET # BLD AUTO: 362 10E9/L (ref 150–450)
RBC # BLD AUTO: 4.75 10E12/L (ref 3.8–5.2)
TRIGL SERPL-MCNC: 87 MG/DL
TSH SERPL DL<=0.005 MIU/L-ACNC: 3.24 MU/L (ref 0.4–4)
VIT B12 SERPL-MCNC: 432 PG/ML (ref 193–986)
WBC # BLD AUTO: 4.8 10E9/L (ref 4–11)

## 2017-10-02 PROCEDURE — 85027 COMPLETE CBC AUTOMATED: CPT | Performed by: NURSE PRACTITIONER

## 2017-10-02 PROCEDURE — 99213 OFFICE O/P EST LOW 20 MIN: CPT | Mod: 25 | Performed by: NURSE PRACTITIONER

## 2017-10-02 PROCEDURE — 80061 LIPID PANEL: CPT | Performed by: NURSE PRACTITIONER

## 2017-10-02 PROCEDURE — G0476 HPV COMBO ASSAY CA SCREEN: HCPCS | Performed by: NURSE PRACTITIONER

## 2017-10-02 PROCEDURE — 82607 VITAMIN B-12: CPT | Performed by: NURSE PRACTITIONER

## 2017-10-02 PROCEDURE — G0145 SCR C/V CYTO,THINLAYER,RESCR: HCPCS | Performed by: NURSE PRACTITIONER

## 2017-10-02 PROCEDURE — 36415 COLL VENOUS BLD VENIPUNCTURE: CPT | Performed by: NURSE PRACTITIONER

## 2017-10-02 PROCEDURE — 99396 PREV VISIT EST AGE 40-64: CPT | Performed by: NURSE PRACTITIONER

## 2017-10-02 PROCEDURE — 84443 ASSAY THYROID STIM HORMONE: CPT | Performed by: NURSE PRACTITIONER

## 2017-10-02 NOTE — NURSING NOTE
"Chief Complaint   Patient presents with     Physical       Initial /84  Pulse 85  Temp 97.3  F (36.3  C) (Tympanic)  Wt 184 lb 6.4 oz (83.6 kg)  LMP 05/19/2008  BMI 33.73 kg/m2 Estimated body mass index is 33.73 kg/(m^2) as calculated from the following:    Height as of 2/14/17: 5' 2\" (1.575 m).    Weight as of this encounter: 184 lb 6.4 oz (83.6 kg).  Medication Reconciliation: complete  "

## 2017-10-02 NOTE — PATIENT INSTRUCTIONS
Stop the prilosec, watch the diet and continue efforts with weight loss. Use an over the counter tagament as needed for symptoms. I have placed an order for a consult with [facility] to discuss benefits and risks of continuing the prilosec versus coming off it.         Preventive Health Recommendations  Female Ages 50 - 64    Yearly exam: See your health care provider every year in order to  o Review health changes.   o Discuss preventive care.    o Review your medicines if your doctor has prescribed any.      Get a Pap test every three years (unless you have an abnormal result and your provider advises testing more often).    If you get Pap tests with HPV test, you only need to test every 5 years, unless you have an abnormal result.     You do not need a Pap test if your uterus was removed (hysterectomy) and you have not had cancer.    You should be tested each year for STDs (sexually transmitted diseases) if you're at risk.     Have a mammogram every 1 to 2 years.    Have a colonoscopy at age 50, or have a yearly FIT test (stool test). These exams screen for colon cancer.      Have a cholesterol test every 5 years, or more often if advised.    Have a diabetes test (fasting glucose) every three years. If you are at risk for diabetes, you should have this test more often.     If you are at risk for osteoporosis (brittle bone disease), think about having a bone density scan (DEXA).    Shots: Get a flu shot each year. Get a tetanus shot every 10 years.    Nutrition:     Eat at least 5 servings of fruits and vegetables each day.    Eat whole-grain bread, whole-wheat pasta and brown rice instead of white grains and rice.    Talk to your provider about Calcium and Vitamin D.     Lifestyle    Exercise at least 150 minutes a week (30 minutes a day, 5 days a week). This will help you control your weight and prevent disease.    Limit alcohol to one drink per day.    No smoking.     Wear sunscreen to prevent skin cancer.     See  your dentist every six months for an exam and cleaning.    See your eye doctor every 1 to 2 years.

## 2017-10-02 NOTE — MR AVS SNAPSHOT
After Visit Summary   10/2/2017    Vanessa Mckeon    MRN: [id number]           Patient Information     Date Of Birth          1955        Visit Information        Provider Department      10/2/2017 8:30 AM Tatiana Leahy APRN [facility]        Today's Diagnoses     Routine general medical examination at a health care facility    -  1    Hyperlipidemia LDL goal <160        Iron deficiency anemia, unspecified iron deficiency anemia type        Gastroesophageal reflux disease without esophagitis        Hypothyroidism, unspecified type          Care Instructions    Stop the prilosec, watch the diet and continue efforts with weight loss. Use an over the counter tagament as needed for symptoms. I have placed an order for a consult with [facility] to discuss benefits and risks of continuing the prilosec versus coming off it.         Preventive Health Recommendations  Female Ages 50 - 64    Yearly exam: See your health care provider every year in order to  o Review health changes.   o Discuss preventive care.    o Review your medicines if your doctor has prescribed any.      Get a Pap test every three years (unless you have an abnormal result and your provider advises testing more often).    If you get Pap tests with HPV test, you only need to test every 5 years, unless you have an abnormal result.     You do not need a Pap test if your uterus was removed (hysterectomy) and you have not had cancer.    You should be tested each year for STDs (sexually transmitted diseases) if you're at risk.     Have a mammogram every 1 to 2 years.    Have a colonoscopy at age 50, or have a yearly FIT test (stool test). These exams screen for colon cancer.      Have a cholesterol test every 5 years, or more often if advised.    Have a diabetes test (fasting glucose) every three years. If you are at risk for diabetes, you should have this test more often.     If you are at risk for osteoporosis (brittle  bone disease), think about having a bone density scan (DEXA).    Shots: Get a flu shot each year. Get a tetanus shot every 10 years.    Nutrition:     Eat at least 5 servings of fruits and vegetables each day.    Eat whole-grain bread, whole-wheat pasta and brown rice instead of white grains and rice.    Talk to your provider about Calcium and Vitamin D.     Lifestyle    Exercise at least 150 minutes a week (30 minutes a day, 5 days a week). This will help you control your weight and prevent disease.    Limit alcohol to one drink per day.    No smoking.     Wear sunscreen to prevent skin cancer.     See your dentist every six months for an exam and cleaning.    See your eye doctor every 1 to 2 years.            Follow-ups after your visit        Additional Services     GASTROENTEROLOGY ADULT REF CONSULT ONLY       Preferred Location: [facility] [phone]      Please be aware that coverage of these services is subject to the terms and limitations of your health insurance plan.  Call member services at your health plan with any benefit or coverage questions.  Any procedures must be performed at a [facility] facility OR coordinated by your clinic's referral office.    Please bring the following with you to your appointment:    (1) Any X-Rays, CTs or MRIs which have been performed.  Contact the facility where they were done to arrange for  prior to your scheduled appointment.    (2) List of current medications   (3) This referral request   (4) Any documents/labs given to you for this referral                  Who to contact     If you have questions or need follow up information about today's clinic visit or your schedule please contact [facility] directly at [phone].  Normal or non-critical lab and imaging results will be communicated to you by MyChart, letter or phone within 4 business days after the clinic has received the results. If you do not hear from us within 7 days, please contact the  "clinic through [email or web address] or phone. If you have a critical or abnormal lab result, we will notify you by phone as soon as possible.  Submit refill requests through [employer] or call your pharmacy and they will forward the refill request to us. Please allow 3 business days for your refill to be completed.          Additional Information About Your Visit        [email or web address] Information     [employer] lets you send messages to your doctor, view your test results, renew your prescriptions, schedule appointments and more. To sign up, go to [email or web address] . Click on \"Log in\" on the left side of the screen, which will take you to the Welcome page. Then click on \"Sign up Now\" on the right side of the page.     You will be asked to enter the access code listed below, as well as some personal information. Please follow the directions to create your username and password.     Your access code is: [email or web address]  Expires: 10/10/2017 10:07 AM     Your access code will  in 90 days. If you need help or a new code, please call your [facility] clinic or [phone].        Care EveryWhere ID     This is your Care EveryWhere ID. This could be used by other organizations to access your [facility] medical records  [id number]        Your Vitals Were     Pulse Temperature Last Period BMI (Body Mass Index)          85 97.3  F (36.3  C) (Tympanic) 2008 33.73 kg/m2         Blood Pressure from Last 3 Encounters:   10/02/17 128/84   17 137/85   17 128/84    Weight from Last 3 Encounters:   10/02/17 184 lb 6.4 oz (83.6 kg)   17 184 lb 6.4 oz (83.6 kg)   17 187 lb (84.8 kg)              We Performed the Following     CBC with platelets     GASTROENTEROLOGY ADULT REF CONSULT ONLY     HPV High Risk Types DNA Cervical     Lipid Profile     Pap imaged thin layer screen with HPV - recommended age 30 - 65     TSH with free T4 reflex     Vitamin B12        Primary Care Provider Office Phone # Fax #    Tatiana MARTINEZ" Armond, APRN [phone] [address] [place] [address]        Equal Access to Services     JENARO ALVARENGA : Hadii aad ku hadgayleevelyn Randal, roqueewa burksluluha, dontae kaemily becerril, keith matthiasin hayaageneva montemayornahum ness laGeovannyandres castaneda. So [facility] [phone].    ATENCIÓN: Si habla español, tiene a persaud disposición servicios gratuitos de asistencia lingüística. Llame al [phone].    We comply with applicable federal civil rights laws and [place] laws. We do not discriminate on the basis of race, color, national origin, age, disability, sex, sexual orientation, or gender identity.            Thank you!     Thank you for choosing [facility]  for your care. Our goal is always to provide you with excellent care. Hearing back from our patients is one way we can continue to improve our services. Please take a few minutes to complete the written survey that you may receive in the mail after your visit with us. Thank you!             Your Updated Medication List - Protect others around you: Learn how to safely use, store and throw away your medicines at www.disposemymeds.org.          This list is accurate as of: 10/2/17  8:51 AM.  Always use your most recent med list.                   Brand Name Dispense Instructions for use Diagnosis    fluticasone 50 MCG/ACT spray    FLONASE    1 Package    Spray 2 sprays into both nostrils daily    Rhinitis       levothyroxine 75 MCG tablet    SYNTHROID/LEVOTHROID    90 tablet    Take 1 tablet (75 mcg) by mouth daily    Hypothyroidism, unspecified type       MULTIVITAMIN PO      Take  by mouth.        omeprazole 20 MG CR capsule    priLOSEC    90 capsule    TAKE ONE CAPSULE BY MOUTH EVERY DAY    Gastroesophageal reflux disease without esophagitis

## 2017-10-02 NOTE — PROGRESS NOTES
SUBJECTIVE:   CC: Vanessa Mckeon is an 62 year old woman who presents for preventive health visit.     Physical   Annual:     Getting at least 3 servings of Calcium per day::  Yes    Bi-annual eye exam::  Yes    Dental care twice a year::  Yes    Sleep apnea or symptoms of sleep apnea::  None    Diet::  Regular (no restrictions)    Frequency of exercise::  2-3 days/week    Duration of exercise::  30-45 minutes    Taking medications regularly::  Yes    Medication side effects::  None    Additional concerns today::  No    Hx hypothyroidism, no symptoms of concern. She noes it has been difficult to lose weight, her weight is stable. Last TSH was   TSH   Date Value Ref Range Status   03/01/2017 2.97 0.40 - 4.00 mU/L Final     Hx of reflux, is on PPI x 5 yrs. She has tried to take 1-2 days off of PPI, but her symptoms do return. She does try to keep away from food triggers. She had an EGD 2 yrs ago and saw some erosion.       Today's PHQ-2 Score: PHQ-2 ( 1999 Pfizer) 10/2/2017   Q1: Little interest or pleasure in doing things 0   Q2: Feeling down, depressed or hopeless 0   PHQ-2 Score 0   Q1: Little interest or pleasure in doing things Not at all   Q2: Feeling down, depressed or hopeless Not at all   PHQ-2 Score 0       Abuse: Current or Past(Physical, Sexual or Emotional)- No  Do you feel safe in your environment - Yes    Social History   Substance Use Topics     Smoking status: Former Smoker     Years: 10.00     Quit date: 4/1/2003     Smokeless tobacco: Never Used     Alcohol use Yes      Comment: rarely     The patient does not drink >3 drinks per day nor >7 drinks per week.    Reviewed orders with patient.  Reviewed health maintenance and updated orders accordingly - Yes  Labs reviewed in [facility]    Patient over age 50, mutual decision to screen reflected in health maintenance.      Pertinent mammograms are reviewed under the imaging tab.  History of abnormal Pap smear: NO - age 30-65 PAP every 5 years with  negative HPV co-testing recommended    Reviewed and updated as needed this visit by clinical staff         Reviewed and updated as needed this visit by Provider              ROS:  C: NEGATIVE for fever, chills, change in weight  I: NEGATIVE for worrisome rashes, moles or lesions  E: NEGATIVE for vision changes or irritation  ENT: NEGATIVE for ear, mouth and throat problems  R: NEGATIVE for significant cough or SOB  B: NEGATIVE for masses, tenderness or discharge  CV: NEGATIVE for chest pain, palpitations or peripheral edema  GI: per above  : NEGATIVE for unusual urinary or vaginal symptoms. No vaginal bleeding.  M: NEGATIVE for significant arthralgias or myalgia  N: NEGATIVE for weakness, dizziness or paresthesias  P: NEGATIVE for changes in mood or affect      OBJECTIVE:   /84  Pulse 85  Temp 97.3  F (36.3  C) (Tympanic)  Wt 184 lb 6.4 oz (83.6 kg)  LMP 05/19/2008  BMI 33.73 kg/m2  EXAM:  GENERAL: healthy, alert and no distress  EYES: Eyes grossly normal to inspection, PERRL and conjunctivae and sclerae normal  HENT: ear canals and TM's normal, nose and mouth without ulcers or lesions  NECK: no adenopathy, no asymmetry, masses, or scars and thyroid normal to palpation  RESP: lungs clear to auscultation - no rales, rhonchi or wheezes  CV: regular rate and rhythm, normal S1 S2, no S3 or S4, no murmur, click or rub, no peripheral edema and peripheral pulses strong  ABDOMEN: soft, nontender, no hepatosplenomegaly, no masses and bowel sounds normal   (female): normal female external genitalia, normal urethral meatus, vaginal mucosa, normal cervix/adnexa/uterus without masses or discharge  MS: no gross musculoskeletal defects noted, no edema  SKIN: no suspicious lesions or rashes  PSYCH: mentation appears normal, affect normal/bright    ASSESSMENT/PLAN:   1. Routine general medical examination at a health care facility    - Pap imaged thin layer screen with HPV - recommended age 30 - 65  - HPV High Risk  "Types DNA Cervical  - Lipid Profile    2. Hyperlipidemia LDL goal <160  Due to recheck    3. Iron deficiency anemia, unspecified iron deficiency anemia type  No symptoms of concern, takes an oral iron daily. Will recheck  - CBC with platelets    4. Gastroesophageal reflux disease without esophagitis  Ongoing issue, has been on PPI x 5 yrs, had EGD 2 yrs ago and we reviewed these results which did find some changes at terminal end of esophogus. We reviewed the risks of long term PPI as well. Will refer to GI to discus benefits/risks of ongoing PPI use, but she will try switching to tagament for symptoms   - Vitamin B12  - GASTROENTEROLOGY ADULT REF CONSULT ONLY    5. Hypothyroidism, unspecified type  Difficult to lose weight, last TSH was [facility]. Will recheck today  - TSH with free T4 reflex    COUNSELING:  Reviewed preventive health counseling, as reflected in patient instructions         reports that she quit smoking about 14 years ago. She quit after 10.00 years of use. She has never used smokeless tobacco.    Estimated body mass index is 33.73 kg/(m^2) as calculated from the following:    Height as of 2/14/17: 5' 2\" (1.575 m).    Weight as of this encounter: 184 lb 6.4 oz (83.6 kg).   Weight management plan: Patient was referred to their PCP to discuss a diet and exercise plan.    Counseling Resources:  ATP IV Guidelines  Pooled Cohorts Equation Calculator  Breast Cancer Risk Calculator  FRAX Risk Assessment  ICSI Preventive Guidelines  Dietary Guidelines for Americans, 2010  USDA's MyPlate  ASA Prophylaxis  Lung CA Screening    LENARD Higuera [facility]  "

## 2017-10-02 NOTE — LETTER
October 11, 2017      Vanessa Mckeon  [address]   [place] [address]    Dear ,      This letter is in regards to the PAP smear and HPV (Human Papillomavirus) test you had done recently. Your PAP test result is normal, but your HPV (Human Papillomavirus) test was positive.     About 80 percent of women have been exposed to HPV virus throughout their lifetime. There is no medication for the treatment of HPV. Typically your own immune system gets rid of the virus before it does harm. HPV is spread by direct skin-to-skin contact, including sexual intercourse, oral sex, anal sex, or any other contact involving the genital area (example: hand to genital contact). It is not possible to become infected with HPV by touching an object, such as a toilet seat. Most people who are infected with HPV have no signs or symptoms.    Things that you can do to boost your immune system and help your body get rid of HPV: get plenty of rest, eat a well-balanced diet of healthy foods, and stop smoking.     Please return in 1 year to repeat your pap smear and HPV test.     If you have additional questions regarding this result, please call [phone].    Sincerely,      LENARD Higuera CNP/[facility]

## 2017-10-04 LAB
COPATH REPORT: NORMAL
PAP: NORMAL

## 2017-10-09 LAB
FINAL DIAGNOSIS: ABNORMAL
HPV HR 12 DNA CVX QL NAA+PROBE: POSITIVE
HPV16 DNA SPEC QL NAA+PROBE: NEGATIVE
HPV18 DNA SPEC QL NAA+PROBE: NEGATIVE
SPECIMEN DESCRIPTION: ABNORMAL

## 2017-10-10 ENCOUNTER — RESULT FOLLOW UP (OUTPATIENT)
Dept: PEDIATRICS | Facility: CLINIC | Age: 62
End: 2017-10-10

## 2017-10-10 DIAGNOSIS — R87.810 CERVICAL HIGH RISK HPV (HUMAN PAPILLOMAVIRUS) TEST POSITIVE: ICD-10-CM

## 2017-10-10 DIAGNOSIS — N87.0 MILD DYSPLASIA OF CERVIX: ICD-10-CM

## 2017-10-10 NOTE — LETTER
September 19, 2018      Vanessa Linda  [address]   [place] [address]    Dear ,      At [facility], your health and wellness is our primary concern. That is why we are following up on a positive high risk HPV test from 10/2/17. Your provider had recommended that you have a Pap smear and HPV test completed by 10/2/18. Our records do not show that this has been scheduled.    It is important to complete the follow up that your provider has suggested for you to ensure that there are no worsening changes which may, over time, develop into cancer.      Please contact our office at  [phone] to schedule an appointment for a Pap smear and HPV test at your earliest convenience. If you have questions or concerns, please call the clinic and we will be happy to assist you.    If you have completed the tests outside of [facility], please have the results forwarded to our office. We will update the chart for your primary Physician to review before your next annual physical.     Thank you for choosing [facility]!    Sincerely,      LENARD Higuera CNP/[facility]m

## 2017-11-08 ENCOUNTER — TRANSFERRED RECORDS (OUTPATIENT)
Dept: HEALTH INFORMATION MANAGEMENT | Facility: CLINIC | Age: 62
End: 2017-11-08

## 2017-11-15 ENCOUNTER — RADIANT APPOINTMENT (OUTPATIENT)
Dept: MAMMOGRAPHY | Facility: CLINIC | Age: 62
End: 2017-11-15
Attending: NURSE PRACTITIONER
Payer: OTHER GOVERNMENT

## 2017-11-15 DIAGNOSIS — Z12.31 VISIT FOR SCREENING MAMMOGRAM: ICD-10-CM

## 2017-11-15 PROCEDURE — G0202 SCR MAMMO BI INCL CAD: HCPCS | Mod: TC

## 2017-12-13 ENCOUNTER — OFFICE VISIT (OUTPATIENT)
Dept: PEDIATRICS | Facility: CLINIC | Age: 62
End: 2017-12-13
Payer: OTHER GOVERNMENT

## 2017-12-13 VITALS
WEIGHT: 183.6 LBS | SYSTOLIC BLOOD PRESSURE: 123 MMHG | DIASTOLIC BLOOD PRESSURE: 80 MMHG | HEART RATE: 73 BPM | BODY MASS INDEX: 33.58 KG/M2 | TEMPERATURE: 97.6 F

## 2017-12-13 DIAGNOSIS — D50.9 IRON DEFICIENCY ANEMIA, UNSPECIFIED IRON DEFICIENCY ANEMIA TYPE: ICD-10-CM

## 2017-12-13 DIAGNOSIS — N89.8 VAGINAL DISCHARGE: ICD-10-CM

## 2017-12-13 DIAGNOSIS — J06.9 VIRAL URI: Primary | ICD-10-CM

## 2017-12-13 LAB
ERYTHROCYTE [DISTWIDTH] IN BLOOD BY AUTOMATED COUNT: 20 % (ref 10–15)
HCT VFR BLD AUTO: 47 % (ref 35–47)
HGB BLD-MCNC: 15.2 G/DL (ref 11.7–15.7)
MCH RBC QN AUTO: 28.6 PG (ref 26.5–33)
MCHC RBC AUTO-ENTMCNC: 32.3 G/DL (ref 31.5–36.5)
MCV RBC AUTO: 89 FL (ref 78–100)
PLATELET # BLD AUTO: 269 10E9/L (ref 150–450)
RBC # BLD AUTO: 5.31 10E12/L (ref 3.8–5.2)
SPECIMEN SOURCE: NORMAL
WBC # BLD AUTO: 4.8 10E9/L (ref 4–11)
WET PREP SPEC: NORMAL

## 2017-12-13 PROCEDURE — 85027 COMPLETE CBC AUTOMATED: CPT | Performed by: NURSE PRACTITIONER

## 2017-12-13 PROCEDURE — 99213 OFFICE O/P EST LOW 20 MIN: CPT | Performed by: NURSE PRACTITIONER

## 2017-12-13 PROCEDURE — 36415 COLL VENOUS BLD VENIPUNCTURE: CPT | Performed by: NURSE PRACTITIONER

## 2017-12-13 PROCEDURE — 87210 SMEAR WET MOUNT SALINE/INK: CPT | Performed by: NURSE PRACTITIONER

## 2017-12-13 NOTE — LETTER
[address] [address]                  [phone]   December 13, 2017    Vanessa Mckeon  [address]   [place] [address]      Dear Vanessa,    Here is a summary of your recent test results:    Your blood results are attached and it appears your anemia looks improved from last time. I'd recommend you continue the iron supplement. Please contact me if you have questions.     Tatiana Leahy DNP Family Practice             Results for orders placed or performed in visit on 12/13/17   **CBC with platelets FUTURE anytime   Result Value Ref Range    WBC 4.8 4.0 - 11.0 10e9/L    RBC Count 5.31 (H) 3.8 - 5.2 10e12/L    Hemoglobin 15.2 11.7 - 15.7 g/dL    Hematocrit 47.0 35.0 - 47.0 %    MCV 89 78 - 100 fl    MCH 28.6 26.5 - 33.0 pg    MCHC 32.3 31.5 - 36.5 g/dL    RDW 20.0 (H) 10.0 - 15.0 %    Platelet Count 269 150 - 450 10e9/L   Wet prep   Result Value Ref Range    Specimen Description Vagina     Wet Prep No Trichomonas seen     Wet Prep No clue cells seen     Wet Prep No yeast seen

## 2017-12-13 NOTE — MR AVS SNAPSHOT
After Visit Summary   12/13/2017    Vanessa Mckeon    MRN: [id number]           Patient Information     Date Of Birth          1955        Visit Information        Provider Department      12/13/2017 11:00 AM Tatiana Leahy APRN CNP [facility]        Today's Diagnoses     Viral URI    -  1    Vaginal discharge        Iron deficiency anemia, unspecified iron deficiency anemia type          Care Instructions    Upper Respiratory Infection: Your infection is most likely a virus at this point.  Try saline sinus washes (0.5 tsp salt to 1 cup warm water) to each nostril and blow nose, twice a day.  Also longer hot steamy showers, or head over steamy water and drink at least 8 glasses of water daily.  Maintain a healthy diet to help your immune system combat this.  If you continue to have symptoms or they become worse, please contact me.             Follow-ups after your visit        Your next 10 appointments already scheduled     Feb 06, 2018  3:45 PM CST   [facility] Visit with [facility]ley MD   [facility]    [address] [place] [address]  [address] [place] [id number] [phone]              Who to contact     If you have questions or need follow up information about today's clinic visit or your schedule please contact [facility] directly at [phone].  Normal or non-critical lab and imaging results will be communicated to you by MyChart, letter or phone within 4 business days after the clinic has received the results. If you do not hear from us within 7 days, please contact the clinic through MyChart or phone. If you have a critical or abnormal lab result, we will notify you by phone as soon as possible.  Submit refill requests through [employer] or call your pharmacy and they will forward the refill request to us. Please allow 3 business days for your refill to be completed.          Additional Information About Your  "Visit        [employer] Information     LOOKCAST lets you send messages to your doctor, view your test results, renew your prescriptions, schedule appointments and more. To sign up, go to [email or web address] . Click on \"Log in\" on the left side of the screen, which will take you to the Welcome page. Then click on \"Sign up Now\" on the right side of the page.     You will be asked to enter the access code listed below, as well as some personal information. Please follow the directions to create your username and password.     Your access code is: [email or web address]  Expires: 3/13/2018 11:50 AM     Your access code will  in 90 days. If you need help or a new code, please call your [place] clinic or [phone].        Care EveryWhere ID     This is your Care EveryWhere ID. This could be used by other organizations to access your [place] medical records  [id number]        Your Vitals Were     Pulse Temperature Last Period BMI (Body Mass Index)          73 97.6  F (36.4  C) (Tympanic) 2008 33.58 kg/m2         Blood Pressure from Last 3 Encounters:   17 123/80   10/02/17 128/84   17 137/85    Weight from Last 3 Encounters:   17 183 lb 9.6 oz (83.3 kg)   10/02/17 184 lb 6.4 oz (83.6 kg)   17 184 lb 6.4 oz (83.6 kg)              We Performed the Following     **CBC with platelets FUTURE anytime     Wet prep        Primary Care Provider Office Phone # Fax #    Tatiana Leahy, APRN [phone] [address] [place] [address]        Equal Access to Services     [facility] COLETTE AH: Chantelle Tee, naresh morales, dontae becerril, keith castaneda. So [facility] [phone].    ATENCIÓN: Si habla español, tiene a persaud disposición servicios gratuitos de asistencia lingüística. Llame al [phone].    We comply with applicable federal civil rights laws and [place] laws. We do not discriminate on the basis of " race, color, national origin, age, disability, sex, sexual orientation, or gender identity.            Thank you!     Thank you for choosing [facility]  for your care. Our goal is always to provide you with excellent care. Hearing back from our patients is one way we can continue to improve our services. Please take a few minutes to complete the written survey that you may receive in the mail after your visit with us. Thank you!             Your Updated Medication List - Protect others around you: Learn how to safely use, store and throw away your medicines at www.disposemymeds.org.          This list is accurate as of: 12/13/17 11:50 AM.  Always use your most recent med list.                   Brand Name Dispense Instructions for use Diagnosis    fluticasone 50 MCG/ACT spray    FLONASE    1 Package    Spray 2 sprays into both nostrils daily    Rhinitis       IRON COMPLEX PO           levothyroxine 75 MCG tablet    SYNTHROID/LEVOTHROID    90 tablet    Take 1 tablet (75 mcg) by mouth daily    Hypothyroidism, unspecified type       MULTIVITAMIN PO      Take  by mouth.

## 2017-12-13 NOTE — NURSING NOTE
"Chief Complaint   Patient presents with     URI       Initial /80  Pulse 73  Temp 97.6  F (36.4  C) (Tympanic)  Wt 183 lb 9.6 oz (83.3 kg)  LMP 05/19/2008  BMI 33.58 kg/m2 Estimated body mass index is 33.58 kg/(m^2) as calculated from the following:    Height as of 2/14/17: 5' 2\" (1.575 m).    Weight as of this encounter: 183 lb 9.6 oz (83.3 kg).  Medication Reconciliation: complete  "

## 2017-12-13 NOTE — PATIENT INSTRUCTIONS
Upper Respiratory Infection: Your infection is most likely a virus at this point.  Try saline sinus washes (0.5 tsp salt to 1 cup warm water) to each nostril and blow nose, twice a day.  Also longer hot steamy showers, or head over steamy water and drink at least 8 glasses of water daily.  Maintain a healthy diet to help your immune system combat this.  If you continue to have symptoms or they become worse, please contact me.

## 2017-12-13 NOTE — PROGRESS NOTES
SUBJECTIVE:   Vanessa Mckeon is a 62 year old female who presents to clinic today for the following health issues    RESPIRATORY SYMPTOMS      Duration: one week    Description  Rhinorrhea when bends over, headache, fatigue/malaise, myalgias and nausea sometimes    Severity: mild    Accompanying signs and symptoms: None    History (predisposing factors):  none    Precipitating or alleviating factors: None    Therapies tried and outcome:  rest and fluids-symptoms persisting    One wk hx of rhionrrhea, mild sore throat and body aches withOUT fever.     Vaginal Symptoms      Duration: this past week x 4-5 episodes    Description  vaginal discharge - clear with some noticed blood    Intensity:  mild    Accompanying signs and symptoms (fever/dysuria/abdominal or back pain): lower abdomen pain,feels like when she is going to get her menses    History  Sexually active: yes, single partner, contraception - not needed  Possibility of pregnancy: No  Recent antibiotic use: no     Precipitating or alleviating factors: None    Therapies tried and outcome: none   Outcome: na    Vag d/c described as light red and clear, mild itch and irritation withOUT visible rash. She also reports mild cramping sensation mid lower abd. The amt of d/c is very small, only with occasional wiping, not enough to wear a pad, has occurred 5 times in the past 1 wk.     Hx of anemia, due to recheck today. She is taking iron supplement.     Problem list and histories reviewed & adjusted, as indicated.  Additional history: as documented    Patient Active Problem List   Diagnosis     COLON POLYPS     HYPERLIPIDEMIA LDL GOAL <160     Obesity     Advanced directives, counseling/discussion     Lumbago     Hiatal hernia     Plantar fasciitis, bilateral     Hypothyroidism, unspecified type     Iron deficiency anemia, unspecified iron deficiency anemia type     Gastroesophageal reflux disease without esophagitis     Cervical high risk HPV (human  papillomavirus) test positive     Past Surgical History:   Procedure Laterality Date     BIOPSY OF BREAST, INCISIONAL  2004, 2001    left breast     C EXPLORATORY OF ABDOMEN  1980    Laparotomy, thought she had ovarian cyst, but nothing was found     COLONOSCOPY  10/8/2011    Procedure:COMBINED COLONOSCOPY, SINGLE BIOPSY/POLYPECTOMY BY BIOPSY; COLONOSCOPY; Surgeon:DARIANA RUIZ; Location: GI     COLONOSCOPY N/A 10/13/2015    Procedure: COLONOSCOPY;  Surgeon: Toi Sotelo MD;  Location:  GI     PHACOEMULSIFICATION CLEAR CORNEA WITH STANDARD INTRAOCULAR LENS IMPLANT  10/4/2012    Procedure: PHACOEMULSIFICATION CLEAR CORNEA WITH STANDARD INTRAOCULAR LENS IMPLANT;  RIGHT PHACOEMULSIFICATION CLEAR CORNEA WITH STANDARD INTRAOCULAR LENS IMPLANT;  Surgeon: Óscar Torrez MD;  Location:  [facility]     PHACOEMULSIFICATION CLEAR CORNEA WITH STANDARD INTRAOCULAR LENS IMPLANT  11/1/2012    Procedure: PHACOEMULSIFICATION CLEAR CORNEA WITH STANDARD INTRAOCULAR LENS IMPLANT;  LEFT  PHACOEMULSIFICATION CLEAR CORNEA WITH STANDARD INTRAOCULAR LENS IMPLANT ;  Surgeon: Óscar Torrez MD;  Location:  [facility]     SURGICAL HISTORY OF -       removal of skin cancer       Social History   Substance Use Topics     Smoking status: Former Smoker     Years: 10.00     Quit date: 4/1/2003     Smokeless tobacco: Never Used     Alcohol use Yes      Comment: rarely     Family History   Problem Relation Age of Onset     DIABETES Father      adult     Neurologic Disorder Father      parkinsons     CANCER Father 95     lung     HEART DISEASE Father      C.A.D. Father      HEART DISEASE Mother      Hypertension Mother      Breast Cancer Mother      Thyroid Disease Mother      Lung Cancer Mother      CANCER Mother      Thyroid Disease Sister      Thyroid Disease Sister      HEART DISEASE Paternal Uncle      2 uncles MI             Reviewed and updated as needed this visit by clinical staff     Reviewed and updated as needed this visit by  Provider         ROS:  Constitutional, HEENT, cardiovascular, pulmonary, gi and gu systems are negative, except as otherwise noted.      OBJECTIVE:   /80  Pulse 73  Temp 97.6  F (36.4  C) (Tympanic)  Wt 183 lb 9.6 oz (83.3 kg)  LMP 05/19/2008  BMI 33.58 kg/m2  Body mass index is 33.58 kg/(m^2).  GENERAL: healthy, alert and no distress  EYES: Eyes grossly normal to inspection, PERRL and conjunctivae and sclerae normal  HENT: ear canals and TM's normal, nose and mouth without ulcers or lesions  NECK: no adenopathy, no asymmetry, masses, or scars and thyroid normal to palpation  RESP: lungs clear to auscultation - no rales, rhonchi or wheezes  CV: regular rate and rhythm, normal S1 S2, no S3 or S4, no murmur, click or rub, no peripheral edema and peripheral pulses strong  MS: no gross musculoskeletal defects noted, no edema  SKIN: no suspicious lesions or rashes    Diagnostic Test Results:  Results for orders placed or performed in visit on 12/13/17 (from the past 24 hour(s))   Wet prep   Result Value Ref Range    Specimen Description Vagina     Wet Prep No Trichomonas seen     Wet Prep No clue cells seen     Wet Prep No yeast seen    **CBC with platelets FUTURE anytime   Result Value Ref Range    WBC 4.8 4.0 - 11.0 10e9/L    RBC Count 5.31 (H) 3.8 - 5.2 10e12/L    Hemoglobin 15.2 11.7 - 15.7 g/dL    Hematocrit 47.0 35.0 - 47.0 %    MCV 89 78 - 100 fl    MCH 28.6 26.5 - 33.0 pg    MCHC 32.3 31.5 - 36.5 g/dL    RDW 20.0 (H) 10.0 - 15.0 %    Platelet Count 269 150 - 450 10e9/L       ASSESSMENT/PLAN:       1. Vaginal discharge  She describes very light red tinge to vag d/c which is a very low amt, not enough to wear a pad. Encouraged to contiue to monitor, no s/s yeast or other type of infectino. Discussed would need to refer to OBGYN if she were to have a period, which she is not feeling she has.   - Wet prep    2. Viral URI  Likely viral, reassurance given    3. Iron deficiency anemia, unspecified iron  deficiency anemia type  She has a hx of anemia, has been taking supplement, lianne recheck today, no symptoms of concern  - **CBC with platelets FUTURE anytime    Patient Instructions   Upper Respiratory Infection: Your infection is most likely a virus at this point.  Try saline sinus washes (0.5 tsp salt to 1 cup warm water) to each nostril and blow nose, twice a day.  Also longer hot steamy showers, or head over steamy water and drink at least 8 glasses of water daily.  Maintain a healthy diet to help your immune system combat this.  If you continue to have symptoms or they become worse, please contact me.         LENARD [facility]uera [facility]

## 2018-03-14 ENCOUNTER — TRANSFERRED RECORDS (OUTPATIENT)
Dept: HEALTH INFORMATION MANAGEMENT | Facility: CLINIC | Age: 63
End: 2018-03-14

## 2018-08-02 ENCOUNTER — OFFICE VISIT (OUTPATIENT)
Dept: PEDIATRICS | Facility: CLINIC | Age: 63
End: 2018-08-02
Payer: OTHER GOVERNMENT

## 2018-08-02 VITALS
SYSTOLIC BLOOD PRESSURE: 112 MMHG | DIASTOLIC BLOOD PRESSURE: 78 MMHG | HEIGHT: 63 IN | WEIGHT: 181.6 LBS | HEART RATE: 97 BPM | TEMPERATURE: 97.8 F | BODY MASS INDEX: 32.18 KG/M2

## 2018-08-02 DIAGNOSIS — Z71.89 ADVANCED DIRECTIVES, COUNSELING/DISCUSSION: Primary | ICD-10-CM

## 2018-08-02 PROCEDURE — 99213 OFFICE O/P EST LOW 20 MIN: CPT | Performed by: NURSE PRACTITIONER

## 2018-08-02 NOTE — PROGRESS NOTES
"  SUBJECTIVE:   Vanessa Mckeon is a 62 year old female who presents to clinic today for the following health issues    Discuss healthcare directive.  She notes her mom  in the spring at age 96 of lung cancer. This inspired her to do her advance directive. She had indicated she wanted no CPR, DNR/DNI because she was afraid of dying \"like her mom.\" She has no history of chronic illness, overall is healthy.     Also of note, she has been  10 years and she notes her  is a gambler and has gone into debt due to his gambling.     Problem list and histories reviewed & adjusted, as indicated.  Additional history: as documented    Patient Active Problem List   Diagnosis     COLON POLYPS     HYPERLIPIDEMIA LDL GOAL <160     Obesity     Advanced directives, counseling/discussion     Lumbago     Hiatal hernia     Plantar fasciitis, bilateral     Hypothyroidism, unspecified type     Iron deficiency anemia, unspecified iron deficiency anemia type     Gastroesophageal reflux disease without esophagitis     Cervical high risk HPV (human papillomavirus) test positive     Past Surgical History:   Procedure Laterality Date     BIOPSY OF BREAST, INCISIONAL  2001    left breast     C EXPLORATORY OF ABDOMEN      Laparotomy, thought she had ovarian cyst, but nothing was found     COLONOSCOPY  10/8/2011    Procedure:COMBINED COLONOSCOPY, SINGLE BIOPSY/POLYPECTOMY BY BIOPSY; COLONOSCOPY; Surgeon:DARIANA RUIZ; Location: GI     COLONOSCOPY N/A 10/13/2015    Procedure: COLONOSCOPY;  Surgeon: Toi Sotelo MD;  Location:  GI     PHACOEMULSIFICATION CLEAR CORNEA WITH STANDARD INTRAOCULAR LENS IMPLANT  10/4/2012    Procedure: PHACOEMULSIFICATION CLEAR CORNEA WITH STANDARD INTRAOCULAR LENS IMPLANT;  RIGHT PHACOEMULSIFICATION CLEAR CORNEA WITH STANDARD INTRAOCULAR LENS IMPLANT;  Surgeon: Óscar Torrez MD;  Location:  EC     PHACOEMULSIFICATION CLEAR CORNEA WITH STANDARD INTRAOCULAR LENS IMPLANT  " "11/1/2012    Procedure: PHACOEMULSIFICATION CLEAR CORNEA WITH STANDARD INTRAOCULAR LENS IMPLANT;  LEFT  PHACOEMULSIFICATION CLEAR CORNEA WITH STANDARD INTRAOCULAR LENS IMPLANT ;  Surgeon: Óscar Torrez MD;  Location: [facility]     SURGICAL HISTORY OF -       removal of skin cancer       Social History   Substance Use Topics     Smoking status: Former Smoker     Years: 10.00     Quit date: 4/1/2003     Smokeless tobacco: Never Used     Alcohol use Yes      Comment: rarely     Family History   Problem Relation Age of Onset     Diabetes Father      adult     Neurologic Disorder Father      parkinsons     Cancer Father 95     lung     HEART DISEASE Father      C.A.D. Father      HEART DISEASE Mother      Hypertension Mother      Breast Cancer Mother      Thyroid Disease Mother      Lung Cancer Mother      Cancer Mother      Thyroid Disease Sister      Thyroid Disease Sister      HEART DISEASE Paternal Uncle      2 uncles MI           Reviewed and updated as needed this visit by clinical staff       Reviewed and updated as needed this visit by Provider         ROS:  Constitutional, HEENT, cardiovascular, pulmonary, gi and gu systems are negative, except as otherwise noted.    OBJECTIVE:     /78  Pulse 97  Temp 97.8  F (36.6  C) (Tympanic)  Ht 5' 2.5\" (1.588 m)  Wt 181 lb 9.6 oz (82.4 kg)  LMP 05/19/2008  BMI 32.69 kg/m2  Body mass index is 32.69 kg/(m^2).  GENERAL: healthy, alert and no distress      ASSESSMENT/PLAN:     1. Advanced directives, counseling/discussion  She was teary when discussing the issues with her . I did advise her to consult with her  about her recent inheritance if she goes forward with a divorce. I also questioned her decision to do DNR/DNI and no CPR in her paperwork. She will reconsider and scheduled an appointment with advance directives RN.     Total time spent with patient 15 minutes, >50% time spent on counseling and coordination of care and education on the above " issues.      There are no Patient Instructions on file for this visit.      LENARD Higuera [facility]3]AN

## 2018-08-02 NOTE — MR AVS SNAPSHOT
"              After Visit Summary   2018    Vanessa Mckeon    MRN: [id number]           Patient Information     Date Of Birth          1955        Visit Information        Provider Department      2018 10:30 AM Tatiana Leahy APRN CNP [facility]        Today's Diagnoses     Advanced directives, counseling/discussion    -  1       Follow-ups after your visit        Follow-up notes from your care team     Return in about 4 weeks (around 2018) for Follow up.      Who to contact     If you have questions or need follow up information about today's clinic visit or your schedule please contact [facility] directly at [phone].  Normal or non-critical lab and imaging results will be communicated to you by MyChart, letter or phone within 4 business days after the clinic has received the results. If you do not hear from us within 7 days, please contact the clinic through MyChart or phone. If you have a critical or abnormal lab result, we will notify you by phone as soon as possible.  Submit refill requests through [employer] or call your pharmacy and they will forward the refill request to us. Please allow 3 business days for your refill to be completed.          Additional Information About Your Visit        MyChart Information     [employer] lets you send messages to your doctor, view your test results, renew your prescriptions, schedule appointments and more. To sign up, go to [email or web address] . Click on \"Log in\" on the left side of the screen, which will take you to the Welcome page. Then click on \"Sign up Now\" on the right side of the page.     You will be asked to enter the access code listed below, as well as some personal information. Please follow the directions to create your username and password.     Your access code is: [email or web address]  Expires: 10/31/2018 12:50 PM     Your access code will  in 90 days. If you need help or a new code, please call your " "[facility] or [phone].        Care EveryWhere ID     This is your Care EveryWhere ID. This could be used by other organizations to access your [place] medical records  [id number]        Your Vitals Were     Pulse Temperature Height Last Period BMI (Body Mass Index)       97 97.8  F (36.6  C) (Tympanic) 5' 2.5\" (1.588 m) 05/19/2008 32.69 kg/m2        Blood Pressure from Last 3 Encounters:   08/02/18 112/78   12/13/17 123/80   10/02/17 128/84    Weight from Last 3 Encounters:   08/02/18 181 lb 9.6 oz (82.4 kg)   12/13/17 183 lb 9.6 oz (83.3 kg)   10/02/17 184 lb 6.4 oz (83.6 kg)              Today, you had the following     No orders found for display       Primary Care Provider Office Phone # Fax #    LENARD Soria [phone] [address] [place] [address]        Equal Access to Services     [facility]: Hadii aad ku hadasho Soomaali, waaxda luqadaha, qaybta kaalmada jerrica, keith carrasco . So [facility] [phone].    ATENCIÓN: Si habla español, tiene a persaud disposición servicios gratuitos de asistencia lingüística. [facility] [phone].    We comply with applicable federal civil rights laws and [place] laws. We do not discriminate on the basis of race, color, national origin, age, disability, sex, sexual orientation, or gender identity.            Thank you!     Thank you for choosing [facility]  for your care. Our goal is always to provide you with excellent care. Hearing back from our patients is one way we can continue to improve our services. Please take a few minutes to complete the written survey that you may receive in the mail after your visit with us. Thank you!             Your Updated Medication List - Protect others around you: Learn how to safely use, store and throw away your medicines at www.disposemymeds.org.          This list is accurate as of 8/2/18 12:50 PM.  Always use your most recent med " list.                   Brand Name Dispense Instructions for use Diagnosis    fluticasone 50 MCG/ACT spray    FLONASE    1 Package    Spray 2 sprays into both nostrils daily    Rhinitis       IRON COMPLEX PO           levothyroxine 75 MCG tablet    SYNTHROID/LEVOTHROID    90 tablet    TAKE ONE TABLET BY MOUTH DAILY    Hypothyroidism, unspecified type       MULTIVITAMIN PO      Take  by mouth.

## 2018-08-13 ENCOUNTER — DOCUMENTATION ONLY (OUTPATIENT)
Dept: OTHER | Facility: CLINIC | Age: 63
End: 2018-08-13

## 2018-08-22 ENCOUNTER — DOCUMENTATION ONLY (OUTPATIENT)
Dept: OTHER | Facility: CLINIC | Age: 63
End: 2018-08-22

## 2018-10-09 ENCOUNTER — OFFICE VISIT (OUTPATIENT)
Dept: PEDIATRICS | Facility: CLINIC | Age: 63
End: 2018-10-09
Payer: OTHER GOVERNMENT

## 2018-10-09 VITALS
TEMPERATURE: 96.8 F | BODY MASS INDEX: 33.19 KG/M2 | SYSTOLIC BLOOD PRESSURE: 126 MMHG | WEIGHT: 184.4 LBS | DIASTOLIC BLOOD PRESSURE: 84 MMHG | HEART RATE: 79 BPM

## 2018-10-09 DIAGNOSIS — B97.7 HIGH RISK HPV INFECTION: ICD-10-CM

## 2018-10-09 DIAGNOSIS — E66.09 CLASS 1 OBESITY DUE TO EXCESS CALORIES WITHOUT SERIOUS COMORBIDITY WITH BODY MASS INDEX (BMI) OF 33.0 TO 33.9 IN ADULT: ICD-10-CM

## 2018-10-09 DIAGNOSIS — Z00.00 ROUTINE GENERAL MEDICAL EXAMINATION AT A HEALTH CARE FACILITY: Primary | ICD-10-CM

## 2018-10-09 DIAGNOSIS — D50.9 IRON DEFICIENCY ANEMIA, UNSPECIFIED IRON DEFICIENCY ANEMIA TYPE: ICD-10-CM

## 2018-10-09 DIAGNOSIS — E66.811 CLASS 1 OBESITY DUE TO EXCESS CALORIES WITHOUT SERIOUS COMORBIDITY WITH BODY MASS INDEX (BMI) OF 33.0 TO 33.9 IN ADULT: ICD-10-CM

## 2018-10-09 DIAGNOSIS — E03.9 HYPOTHYROIDISM, UNSPECIFIED TYPE: ICD-10-CM

## 2018-10-09 LAB
ERYTHROCYTE [DISTWIDTH] IN BLOOD BY AUTOMATED COUNT: 14.1 % (ref 10–15)
HCT VFR BLD AUTO: 46.6 % (ref 35–47)
HGB BLD-MCNC: 15.2 G/DL (ref 11.7–15.7)
MCH RBC QN AUTO: 31.1 PG (ref 26.5–33)
MCHC RBC AUTO-ENTMCNC: 32.6 G/DL (ref 31.5–36.5)
MCV RBC AUTO: 96 FL (ref 78–100)
PLATELET # BLD AUTO: 277 10E9/L (ref 150–450)
RBC # BLD AUTO: 4.88 10E12/L (ref 3.8–5.2)
TSH SERPL DL<=0.005 MIU/L-ACNC: 3.86 MU/L (ref 0.4–4)
WBC # BLD AUTO: 5.7 10E9/L (ref 4–11)

## 2018-10-09 PROCEDURE — 36415 COLL VENOUS BLD VENIPUNCTURE: CPT | Performed by: NURSE PRACTITIONER

## 2018-10-09 PROCEDURE — 88175 CYTOPATH C/V AUTO FLUID REDO: CPT | Performed by: NURSE PRACTITIONER

## 2018-10-09 PROCEDURE — 99396 PREV VISIT EST AGE 40-64: CPT | Performed by: NURSE PRACTITIONER

## 2018-10-09 PROCEDURE — 99213 OFFICE O/P EST LOW 20 MIN: CPT | Mod: 25 | Performed by: NURSE PRACTITIONER

## 2018-10-09 PROCEDURE — G0476 HPV COMBO ASSAY CA SCREEN: HCPCS | Performed by: NURSE PRACTITIONER

## 2018-10-09 PROCEDURE — 84443 ASSAY THYROID STIM HORMONE: CPT | Performed by: NURSE PRACTITIONER

## 2018-10-09 PROCEDURE — 85027 COMPLETE CBC AUTOMATED: CPT | Performed by: NURSE PRACTITIONER

## 2018-10-09 ASSESSMENT — ENCOUNTER SYMPTOMS
COUGH: 1
WEAKNESS: 0
PALPITATIONS: 0
EYE PAIN: 0
NERVOUS/ANXIOUS: 0
DYSURIA: 0
SORE THROAT: 0
DIZZINESS: 0
PARESTHESIAS: 0
SHORTNESS OF BREATH: 0
CHILLS: 0
JOINT SWELLING: 0
MYALGIAS: 0
ARTHRALGIAS: 1
HEADACHES: 0
NAUSEA: 0
ABDOMINAL PAIN: 0
BREAST MASS: 0
HEMATURIA: 0
FREQUENCY: 0
FEVER: 0
CONSTIPATION: 0
HEMATOCHEZIA: 0
DIARRHEA: 0
HEARTBURN: 0

## 2018-10-09 NOTE — MR AVS SNAPSHOT
After Visit Summary   10/9/2018    Vanessa Mckeon    MRN: [id number]           Patient Information     Date Of Birth          1955        Visit Information        Provider Department      10/9/2018 9:45 AM Tatiana Leahy APRN [facility]        Today's Diagnoses     Routine general medical examination at a health care facility    -  1    High risk HPV infection        Hypothyroidism, unspecified type        Iron deficiency anemia, unspecified iron deficiency anemia type        Class 1 obesity due to excess calories without serious comorbidity with body mass index (BMI) of 33.0 to 33.9 in adult          Care Instructions    Endocrine - Dr. Souza      Preventive Health Recommendations  Female Ages 50 - 64    Yearly exam: See your health care provider every year in order to  o Review health changes.   o Discuss preventive care.    o Review your medicines if your doctor has prescribed any.      Get a Pap test every three years (unless you have an abnormal result and your provider advises testing more often).    If you get Pap tests with HPV test, you only need to test every 5 years, unless you have an abnormal result.     You do not need a Pap test if your uterus was removed (hysterectomy) and you have not had cancer.    You should be tested each year for STDs (sexually transmitted diseases) if you're at risk.     Have a mammogram every 1 to 2 years.    Have a colonoscopy at age 50, or have a yearly FIT test (stool test). These exams screen for colon cancer.      Have a cholesterol test every 5 years, or more often if advised.    Have a diabetes test (fasting glucose) every three years. If you are at risk for diabetes, you should have this test more often.     If you are at risk for osteoporosis (brittle bone disease), think about having a bone density scan (DEXA).    Shots: Get a flu shot each year. Get a tetanus shot every 10 years.    Nutrition:     Eat at least 5  "servings of fruits and vegetables each day.    Eat whole-grain bread, whole-wheat pasta and brown rice instead of white grains and rice.    Get adequate Calcium and Vitamin D.     Lifestyle    Exercise at least 150 minutes a week (30 minutes a day, 5 days a week). This will help you control your weight and prevent disease.    Limit alcohol to one drink per day.    No smoking.     Wear sunscreen to prevent skin cancer.     See your dentist every six months for an exam and cleaning.    See your eye doctor every 1 to 2 years.            Follow-ups after your visit        Follow-up notes from your care team     Return in about 4 weeks (around 11/6/2018) for endocrine.      Who to contact     If you have questions or need follow up information about today's clinic visit or your schedule please contact [facility] directly at [phone].  Normal or non-critical lab and imaging results will be communicated to you by [employer], letter or phone within 4 business days after the clinic has received the results. If you do not hear from us within 7 days, please contact the clinic through [employer] or phone. If you have a critical or abnormal lab result, we will notify you by phone as soon as possible.  Submit refill requests through [employer] or call your pharmacy and they will forward the refill request to us. Please allow 3 business days for your refill to be completed.          Additional Information About Your Visit        Media[employer] Information     [employer] lets you send messages to your doctor, view your test results, renew your prescriptions, schedule appointments and more. To sign up, go to [email or web address] . Click on \"Log in\" on the left side of the screen, which will take you to the Welcome page. Then click on \"Sign up Now\" on the right side of the page.     You will be asked to enter the access code listed below, as well as some personal information. Please follow the directions to create your username and " password.     Your access code is: [email or web address]  Expires: 10/31/2018 12:50 PM     Your access code will  in 90 days. If you need help or a new code, please call your [place] clinic or [phone].        Care EveryWhere ID     This is your Care EveryWhere ID. This could be used by other organizations to access your [place] medical records  [id number]        Your Vitals Were     Pulse Temperature Last Period BMI (Body Mass Index)          79 96.8  F (36  C) (Tympanic) 2008 33.19 kg/m2         Blood Pressure from Last 3 Encounters:   10/09/18 126/84   18 112/78   17 123/80    Weight from Last 3 Encounters:   10/09/18 184 lb 6.4 oz (83.6 kg)   18 181 lb 9.6 oz (82.4 kg)   17 183 lb 9.6 oz (83.3 kg)              Today, you had the following     No orders found for display       Primary Care Provider Office Phone # Fax #    LENARD Soria [phone] [address] [place] [address]        Equal Access to Services     [facility]: Hadii megha ku hadasho Soomaali, waaxda luqadaha, qaybta kaalmada adeegyada, keith carrasco . So [facility] [phone].    ATENCIÓN: Si habla español, tiene a persaud disposición servicios gratuitos de asistencia lingüística. Llame al [phone].    We comply with applicable federal civil rights laws and [place] laws. We do not discriminate on the basis of race, color, national origin, age, disability, sex, sexual orientation, or gender identity.            Thank you!     Thank you for choosing [facility]  for your care. Our goal is always to provide you with excellent care. Hearing back from our patients is one way we can continue to improve our services. Please take a few minutes to complete the written survey that you may receive in the mail after your visit with us. Thank you!             Your Updated Medication List - Protect others around you: Learn how to safely use,  store and throw away your medicines at www.disposemymeds.org.          This list is accurate as of 10/9/18 10:09 AM.  Always use your most recent med list.                   Brand Name Dispense Instructions for use Diagnosis    fluticasone 50 MCG/ACT spray    FLONASE    1 Package    Spray 2 sprays into both nostrils daily    Rhinitis       IRON COMPLEX PO           levothyroxine 75 MCG tablet    SYNTHROID/LEVOTHROID    90 tablet    TAKE ONE TABLET BY MOUTH DAILY    Hypothyroidism, unspecified type       MULTIVITAMIN PO      Take  by mouth.

## 2018-10-09 NOTE — LETTER
[facility]  [address] [address]                  [phone]   October 9, 2018    Vanessa Mckeon  [address]   [place] [address]      Dear Vanessa,    Here is a summary of your recent test results:    NORMAL COMPLETE BLOOD COUNT.    Your test results are enclosed.      Please contact me if you have any questions.           Thank you very much for choosing [facility]    Best regards,    Tatiana Leahy, DNP/jyotid        Results for orders placed or performed in visit on 10/09/18   TSH with free T4 reflex   Result Value Ref Range    TSH 3.86 0.40 - 4.00 mU/L   CBC with platelets   Result Value Ref Range    WBC 5.7 4.0 - 11.0 10e9/L    RBC Count 4.88 3.8 - 5.2 10e12/L    Hemoglobin 15.2 11.7 - 15.7 g/dL    Hematocrit 46.6 35.0 - 47.0 %    MCV 96 78 - 100 fl    MCH 31.1 26.5 - 33.0 pg    MCHC 32.6 31.5 - 36.5 g/dL    RDW 14.1 10.0 - 15.0 %    Platelet Count 277 150 - 450 10e9/L

## 2018-10-09 NOTE — PROGRESS NOTES
SUBJECTIVE:   CC: Vanessa Mckeon is an 63 year old woman who presents for preventive health visit.     Physical   Annual:     Getting at least 3 servings of Calcium per day:  Yes    Bi-annual eye exam:  Yes    Dental care twice a year:  Yes    Sleep apnea or symptoms of sleep apnea:  Daytime drowsiness    Diet:  Low fat/cholesterol    Frequency of exercise:  4-5 days/week    Duration of exercise:  45-60 minutes    Taking medications regularly:  Yes    Medication side effects:  Not applicable    Additional concerns today:  No    History of pos hpv - due for cotest today.    History of iron deficiency anemia, she does admit to having symptoms of feeling fatigue. She is sleeping more than usual - sleeping from 1030 to 5am for work, but can sleep in until 10am. She denies waking in the night or problems falling asleep. She denies SOB. She does take an iron supplement.     History of hypothyroidism - has fatigue as per above.     TSH   Date Value Ref Range Status   10/02/2017 3.24 0.40 - 4.00 mU/L Final   03/01/2017 2.97 0.40 - 4.00 mU/L Final   01/03/2017 5.85 (H) 0.40 - 4.00 mU/L Final   11/18/2016 12.07 (H) 0.40 - 4.00 mU/L Final   09/27/2016 14.45 (H) 0.40 - 4.00 mU/L Final     SHe has been doing weight watchers since last June. She notes she had initially dropped 6 lbs, but gained it back. She is holding steady since she started. She is walking a lot at work, she also does mall walking a few times per wk. She has been limiting to 1200 calories per day.     Wt Readings from Last 4 Encounters:   10/09/18 184 lb 6.4 oz (83.6 kg)   08/02/18 181 lb 9.6 oz (82.4 kg)   12/13/17 183 lb 9.6 oz (83.3 kg)   10/02/17 184 lb 6.4 oz (83.6 kg)     She notes she has been getting her finances in order in the case she wants to leave her , who is in tends of thousands of dollars in debt. She has  her finances and is currently redoing her will. She is unsure if she will remain .     Today's PHQ-2 Score:    PHQ-2 ( 1999 [employer]) 10/9/2018   Q1: Little interest or pleasure in doing things 0   Q2: Feeling down, depressed or hopeless 0   PHQ-2 Score 0   Q1: Little interest or pleasure in doing things Not at all   Q2: Feeling down, depressed or hopeless Not at all   PHQ-2 Score 0       Abuse: Current or Past(Physical, Sexual or Emotional)- NO  Do you feel safe in your environment - Yes    Social History   Substance Use Topics     Smoking status: Former Smoker     Years: 10.00     Quit date: 4/1/2003     Smokeless tobacco: Never Used     Alcohol use Yes      Comment: rarely     Alcohol Use 10/9/2018   If you drink alcohol do you typically have greater than 3 drinks per day OR greater than 7 drinks per week? No   No flowsheet data found.    Reviewed orders with patient.  Reviewed health maintenance and updated orders accordingly - Yes  Labs reviewed in Psychiatric    Patient over age 50, mutual decision to screen reflected in health maintenance.    Pertinent mammograms are reviewed under the imaging tab.  History of abnormal Pap smear: YES - updated in Problem List and Health Maintenance accordingly  PAP / HPV Latest Ref Rng & Units 10/2/2017 9/10/2014 6/28/2011   PAP - NIL NIL NIL   HPV 16 DNA NEG:Negative Negative - -   HPV 18 DNA NEG:Negative Negative - -   OTHER HR HPV NEG:Negative Positive(A) - -     Reviewed and updated as needed this visit by clinical staff  Tobacco  Allergies  Meds  Med Hx  Surg Hx  Fam Hx  Soc Hx        Reviewed and updated as needed this visit by Provider            Review of Systems   Constitutional: Negative for chills and fever.   HENT: Positive for congestion (end of a cold). Negative for ear pain, hearing loss and sore throat.    Eyes: Positive for visual disturbance (change in her glasses rx). Negative for pain.   Respiratory: Positive for cough. Negative for shortness of breath.    Cardiovascular: Negative for chest pain, palpitations and peripheral edema.   Gastrointestinal: Negative for  abdominal pain, constipation, diarrhea, heartburn, hematochezia and nausea.   Breasts:  Negative for tenderness, breast mass and discharge.   Genitourinary: Negative for dysuria, frequency, genital sores, hematuria, pelvic pain, urgency, vaginal bleeding and vaginal discharge.   Musculoskeletal: Positive for arthralgias (arthritis pain). Negative for joint swelling and myalgias.   Skin: Negative for rash.   Neurological: Negative for dizziness, weakness, headaches and paresthesias.   Psychiatric/Behavioral: Negative for mood changes. The patient is not nervous/anxious.         OBJECTIVE:   /84  Pulse 79  Temp 96.8  F (36  C) (Tympanic)  Wt 184 lb 6.4 oz (83.6 kg)  LMP 05/19/2008  BMI 33.19 kg/m2  Physical Exam  GENERAL: healthy, alert and no distress  EYES: Eyes grossly normal to inspection, PERRL and conjunctivae and sclerae normal  HENT: ear canals and TM's normal, nose and mouth without ulcers or lesions  NECK: no adenopathy, no asymmetry, masses, or scars and thyroid normal to palpation  RESP: lungs clear to auscultation - no rales, rhonchi or wheezes  CV: regular rate and rhythm, normal S1 S2, no S3 or S4, no murmur, click or rub, no peripheral edema and peripheral pulses strong  ABDOMEN: soft, nontender, no hepatosplenomegaly, no masses and bowel sounds normal   (female): normal female external genitalia, normal urethral meatus, vaginal mucosa, normal cervix/adnexa/uterus without masses or discharge  MS: no gross musculoskeletal defects noted, no edema  SKIN: no suspicious lesions or rashes  PSYCH: mentation appears normal, affect normal/bright      ASSESSMENT/PLAN:   1. Routine general medical examination at a health care facility      2. High risk HPV infection  We reviewed her last pap today, she will do pap today and follow recommendations.   - Pap imaged thin layer diagnostic with HPV (select HPV order below)  - HPV High Risk Types DNA Cervical    3. Hypothyroidism, unspecified type  She has  "reported difficulty losing weight and fatigue.   ADDEND: TSH is normal, no adjustments needed for meds  - TSH with free T4 reflex  - levothyroxine (SYNTHROID/LEVOTHROID) 75 MCG tablet; Take 1 tablet (75 mcg) by mouth daily  Dispense: 90 tablet; Refill: prn    4. Iron deficiency anemia, unspecified iron deficiency anemia type  She has a history of marked iron def anemia and is on oral supplement. Her fatigue is concerning for anemia, will recheck today  - CBC with platelets    5. Class 1 obesity due to excess calories without serious comorbidity with body mass index (BMI) of 33.0 to 33.9 in adult    - ENDOCRINOLOGY ADULT REFERRAL    COUNSELING:  Reviewed preventive health counseling, as reflected in patient instructions  Special attention given to:        Regular exercise       Healthy diet/nutrition       Osteoporosis Prevention/Bone Health    BP Readings from Last 1 Encounters:   10/09/18 126/84     Estimated body mass index is 33.19 kg/(m^2) as calculated from the following:    Height as of 8/2/18: 5' 2.5\" (1.588 m).    Weight as of this encounter: 184 lb 6.4 oz (83.6 kg).    BP Screening:   Last 3 BP Readings:    BP Readings from Last 3 Encounters:   10/09/18 126/84   08/02/18 112/78   12/13/17 123/80       The following was recommended to the patient:  Re-screen BP within a year and recommended lifestyle modifications  Weight management plan: Patient referred to endocrine and/or weight management specialty     reports that she quit smoking about 15 years ago. She quit after 10.00 years of use. She has never used smokeless tobacco.      Counseling Resources:  ATP IV Guidelines  Pooled Cohorts Equation Calculator  Breast Cancer Risk Calculator  FRAX Risk Assessment  ICSI Preventive Guidelines  Dietary Guidelines for Americans, 2010  USDA's MyPlate  ASA Prophylaxis  Lung CA Screening    Tatiana Cho, LENARD [facility]  "

## 2018-10-09 NOTE — PATIENT INSTRUCTIONS
Endocrine - Dr. Souza      Preventive Health Recommendations  Female Ages 50 - 64    Yearly exam: See your health care provider every year in order to  o Review health changes.   o Discuss preventive care.    o Review your medicines if your doctor has prescribed any.      Get a Pap test every three years (unless you have an abnormal result and your provider advises testing more often).    If you get Pap tests with HPV test, you only need to test every 5 years, unless you have an abnormal result.     You do not need a Pap test if your uterus was removed (hysterectomy) and you have not had cancer.    You should be tested each year for STDs (sexually transmitted diseases) if you're at risk.     Have a mammogram every 1 to 2 years.    Have a colonoscopy at age 50, or have a yearly FIT test (stool test). These exams screen for colon cancer.      Have a cholesterol test every 5 years, or more often if advised.    Have a diabetes test (fasting glucose) every three years. If you are at risk for diabetes, you should have this test more often.     If you are at risk for osteoporosis (brittle bone disease), think about having a bone density scan (DEXA).    Shots: Get a flu shot each year. Get a tetanus shot every 10 years.    Nutrition:     Eat at least 5 servings of fruits and vegetables each day.    Eat whole-grain bread, whole-wheat pasta and brown rice instead of white grains and rice.    Get adequate Calcium and Vitamin D.     Lifestyle    Exercise at least 150 minutes a week (30 minutes a day, 5 days a week). This will help you control your weight and prevent disease.    Limit alcohol to one drink per day.    No smoking.     Wear sunscreen to prevent skin cancer.     See your dentist every six months for an exam and cleaning.    See your eye doctor every 1 to 2 years.

## 2018-10-10 RX ORDER — LEVOTHYROXINE SODIUM 75 UG/1
75 TABLET ORAL DAILY
Qty: 90 TABLET | Status: SHIPPED | OUTPATIENT
Start: 2018-10-10 | End: 2019-10-24

## 2018-10-12 LAB
COPATH REPORT: NORMAL
PAP: NORMAL

## 2018-10-15 LAB
FINAL DIAGNOSIS: ABNORMAL
HPV HR 12 DNA CVX QL NAA+PROBE: POSITIVE
HPV16 DNA SPEC QL NAA+PROBE: NEGATIVE
HPV18 DNA SPEC QL NAA+PROBE: NEGATIVE
SPECIMEN DESCRIPTION: ABNORMAL
SPECIMEN SOURCE CVX/VAG CYTO: ABNORMAL

## 2018-10-26 ENCOUNTER — OFFICE VISIT (OUTPATIENT)
Dept: OBGYN | Facility: CLINIC | Age: 63
End: 2018-10-26
Payer: OTHER GOVERNMENT

## 2018-10-26 VITALS — WEIGHT: 184.3 LBS | DIASTOLIC BLOOD PRESSURE: 88 MMHG | SYSTOLIC BLOOD PRESSURE: 136 MMHG | BODY MASS INDEX: 33.17 KG/M2

## 2018-10-26 DIAGNOSIS — B97.7 HUMAN PAPILLOMA VIRUS INFECTION: Primary | ICD-10-CM

## 2018-10-26 PROCEDURE — 57456 ENDOCERV CURETTAGE W/SCOPE: CPT | Performed by: OBSTETRICS & GYNECOLOGY

## 2018-10-26 PROCEDURE — 88305 TISSUE EXAM BY PATHOLOGIST: CPT | Performed by: OBSTETRICS & GYNECOLOGY

## 2018-10-26 NOTE — PROGRESS NOTES
63 year old  presents for colposcopy.      Indication for procedure:HPV Positive (16/18 neg, other+ on screenings in 10/18 and 10/17).  Paps have all been negative dating back to .    We also tested your sample for the presence of the HPV (Human Papillomavirus). Your sample was positive for HPV. There are many types of HPV, but we test pap samples for the high risk types. HPV can be the cause of an abnormal Pap smear result.  The high risk types of HPV can also be related to the potential development of cervical cancer if not monitored and/or treated appropriately  Prior history of cervical dysplasia: No    Prior Colposcopy history: No  Prior LEEP:No  Patient's last menstrual period was 2008.    Tobacco: No  Gardasil vaccination status:No    She denies the possibility of pregnancy as she is menopausal    Discussed nature of HPV related infection, natural history and association with cervical dysplasia.  Procedure for colposcopy and biopsy was explained to the patient and consent obtained.  All the patient's questions were answered.    PROCEDURE:  COLPOSCOPY  After a procedural timeout was taken, she was positioned in dorsal lithotomy and a speculum was inserted to allow visualization of the cervix. A 5% acetic acid solution was applied to the ectocervix with large swabs. Lugols solution was also applied.  Colposcopic examination was then undertaken of the cervix, distal vaginal canal and vaginal fornices.    FINDINGS:Physical Exam   Genitourinary:       ECC performed.  Scant tissue returned.  Tolerated well with minimal bleeding.    Procedures    Plan: Specimens labelled and sent to pathology., Will base further treatment on pathology findings. and post biopsy instructions given to patient.      Erik Torres MD

## 2018-10-26 NOTE — NURSING NOTE
"No chief complaint on file.      Initial /88  Wt 184 lb 4.8 oz (83.6 kg)  LMP 2008  BMI 33.17 kg/m2 Estimated body mass index is 33.17 kg/(m^2) as calculated from the following:    Height as of 18: 5' 2.5\" (1.588 m).    Weight as of this encounter: 184 lb 4.8 oz (83.6 kg).  BP completed using cuff size: large    Questioned patient about current smoking habits.  Pt. quit smoking some time ago.          The following HM Due: NONE    Breanna Weber CMA                 "

## 2018-10-26 NOTE — MR AVS SNAPSHOT
"              After Visit Summary   10/26/2018    Vanessa Mckeon    MRN: [id number]           Patient Information     Date Of Birth          1955        Visit Information        Provider Department      10/26/2018 9:15 AM Erik Torres MD [facility]        Today's Diagnoses     Human papilloma virus infection    -  1       Follow-ups after your visit        Who to contact     If you have questions or need follow up information about today's clinic visit or your schedule please contact [facility] directly at [phone].  Normal or non-critical lab and imaging results will be communicated to you by [employer], letter or phone within 4 business days after the clinic has received the results. If you do not hear from us within 7 days, please contact the clinic through [employer] or phone. If you have a critical or abnormal lab result, we will notify you by phone as soon as possible.  Submit refill requests through [employer] or call your pharmacy and they will forward the refill request to us. Please allow 3 business days for your refill to be completed.          Additional Information About Your Visit        MyChart Information     [employer] lets you send messages to your doctor, view your test results, renew your prescriptions, schedule appointments and more. To sign up, go to [email or web address] . Click on \"Log in\" on the left side of the screen, which will take you to the Welcome page. Then click on \"Sign up Now\" on the right side of the page.     You will be asked to enter the access code listed below, as well as some personal information. Please follow the directions to create your username and password.     Your access code is: [email or web address]  Expires: 10/31/2018 12:50 PM     Your access code will  in 90 days. If you need help or a new code, please call your [facility] or [phone].        Care EveryWhere ID     This is your Care EveryWhere ID. This could be used by " other organizations to access your [facility] medical records  [id number]        Your Vitals Were     Last Period BMI (Body Mass Index)                05/19/2008 33.17 kg/m2           Blood Pressure from Last 3 Encounters:   10/26/18 136/88   10/09/18 126/84   08/02/18 112/78    Weight from Last 3 Encounters:   10/26/18 184 lb 4.8 oz (83.6 kg)   10/09/18 184 lb 6.4 oz (83.6 kg)   08/02/18 181 lb 9.6 oz (82.4 kg)              We Performed the Following     COLP CERVIX/UPPER VAGINA W ENDOCERV CURETT     Surgical pathology exam        Primary Care Provider Office Phone # Fax #    Tatiana LENARD Roberts [phone] [address] [place] [address]        Equal Access to Services     [facility]: Hadii aad ku hadasho Soomaali, waaxda luqadaha, qaybta kaalmada adeegyada, keith carrasco . So [facility] [phone].    ATENCIÓN: Si habla español, tiene a persaud disposición servicios gratuitos de asistencia lingüística. [facility] [phone].    We comply with applicable federal civil rights laws and [place] laws. We do not discriminate on the basis of race, color, national origin, age, disability, sex, sexual orientation, or gender identity.            Thank you!     [facility]  [facility]. Our goal is always to provide you with excellent care. Hearing back from our patients is one way we can continue to improve our services. Please take a few minutes to complete the written survey that you may receive in the mail after your visit with us. Thank you!             Your Updated Medication List - Protect others around you: Learn how to safely use, store and throw away your medicines at www.disposemymeds.org.          This list is accurate as of 10/26/18  9:47 AM.  Always use your most recent med list.                   Brand Name Dispense Instructions for use Diagnosis    fluticasone 50 MCG/ACT spray    FLONASE    1 Package     Spray 2 sprays into both nostrils daily    Rhinitis       IRON COMPLEX PO           levothyroxine 75 MCG tablet    SYNTHROID/LEVOTHROID    90 tablet    Take 1 tablet (75 mcg) by mouth daily    Hypothyroidism, unspecified type       MULTIVITAMIN PO      Take  by mouth.

## 2018-10-30 LAB — COPATH REPORT: NORMAL

## 2018-11-20 ENCOUNTER — RADIANT APPOINTMENT (OUTPATIENT)
Dept: MAMMOGRAPHY | Facility: CLINIC | Age: 63
End: 2018-11-20
Attending: NURSE PRACTITIONER
Payer: OTHER GOVERNMENT

## 2018-11-20 DIAGNOSIS — Z12.31 VISIT FOR SCREENING MAMMOGRAM: ICD-10-CM

## 2018-11-20 PROCEDURE — 77067 SCR MAMMO BI INCL CAD: CPT | Mod: TC

## 2019-03-19 ENCOUNTER — TRANSFERRED RECORDS (OUTPATIENT)
Dept: HEALTH INFORMATION MANAGEMENT | Facility: CLINIC | Age: 64
End: 2019-03-19

## 2019-06-04 ENCOUNTER — TELEPHONE (OUTPATIENT)
Dept: PEDIATRICS | Facility: CLINIC | Age: 64
End: 2019-06-04

## 2019-06-04 NOTE — TELEPHONE ENCOUNTER
Received warm transfer from front end staff. Patient was calling to schedule an appointment.     Patient reports: fatigue; SOB only with exertion; poor appetite; swelling in top of feet and and ankles. Patient states she has history or low iron and her symptoms feel the same.     Patient reports bilateral swelling in ankles. Patient states when she presses down on swelling there is a slight indent and it returns to normal within in about 1-2 seconds. Patient states she has been sitting down more than normal related to SOB    Declines chest pain; weakness; nausea/vomiting; dizziness; declines heart palpitations; declines headache; fever; numbness/tingling; signs of dehydration.    Writer advised patient to make appointment to be seen. Patient agreeable.     TC: Please assist patient with scheduling appointment for evaluation in the clinic    Samanta Nye RN  Triage Nurse

## 2019-06-05 ENCOUNTER — ANCILLARY PROCEDURE (OUTPATIENT)
Dept: GENERAL RADIOLOGY | Facility: CLINIC | Age: 64
End: 2019-06-05
Attending: PEDIATRICS
Payer: OTHER GOVERNMENT

## 2019-06-05 ENCOUNTER — OFFICE VISIT (OUTPATIENT)
Dept: PEDIATRICS | Facility: CLINIC | Age: 64
End: 2019-06-05
Payer: OTHER GOVERNMENT

## 2019-06-05 VITALS
HEART RATE: 77 BPM | WEIGHT: 198.7 LBS | DIASTOLIC BLOOD PRESSURE: 82 MMHG | TEMPERATURE: 98.2 F | OXYGEN SATURATION: 95 % | BODY MASS INDEX: 35.76 KG/M2 | RESPIRATION RATE: 16 BRPM | SYSTOLIC BLOOD PRESSURE: 128 MMHG

## 2019-06-05 DIAGNOSIS — R06.09 DYSPNEA ON EXERTION: ICD-10-CM

## 2019-06-05 DIAGNOSIS — R06.09 DYSPNEA ON EXERTION: Primary | ICD-10-CM

## 2019-06-05 LAB
ERYTHROCYTE [DISTWIDTH] IN BLOOD BY AUTOMATED COUNT: 14.3 % (ref 10–15)
HCT VFR BLD AUTO: 49.2 % (ref 35–47)
HGB BLD-MCNC: 16.1 G/DL (ref 11.7–15.7)
MCH RBC QN AUTO: 31.4 PG (ref 26.5–33)
MCHC RBC AUTO-ENTMCNC: 32.7 G/DL (ref 31.5–36.5)
MCV RBC AUTO: 96 FL (ref 78–100)
PLATELET # BLD AUTO: 250 10E9/L (ref 150–450)
RBC # BLD AUTO: 5.13 10E12/L (ref 3.8–5.2)
WBC # BLD AUTO: 5.8 10E9/L (ref 4–11)

## 2019-06-05 PROCEDURE — 85027 COMPLETE CBC AUTOMATED: CPT | Performed by: PEDIATRICS

## 2019-06-05 PROCEDURE — 99213 OFFICE O/P EST LOW 20 MIN: CPT | Mod: GE | Performed by: INTERNAL MEDICINE

## 2019-06-05 PROCEDURE — 83880 ASSAY OF NATRIURETIC PEPTIDE: CPT | Performed by: PEDIATRICS

## 2019-06-05 PROCEDURE — 71046 X-RAY EXAM CHEST 2 VIEWS: CPT

## 2019-06-05 PROCEDURE — 36415 COLL VENOUS BLD VENIPUNCTURE: CPT | Performed by: PEDIATRICS

## 2019-06-05 PROCEDURE — 93000 ELECTROCARDIOGRAM COMPLETE: CPT | Performed by: INTERNAL MEDICINE

## 2019-06-05 NOTE — LETTER
[facility]  [address] [address]                  [phone]   June 11, 2019    Vanessa Mckeon  [address]   [place] [address]      Vanessa,     Attached are all your labs and results. As we talked about, things look good. If things are better, continue to focus on weight loss and diet. If not, come back on Wednesday June 12th to discuss further options.     Feel free to call the clinic with any questions.     Nitish Still MD   [facility]     Results for orders placed or performed in visit on 06/05/19   XR Chest 2 Views    Narrative    CHEST TWO VIEWS 6/5/2019 1:55 PM     HISTORY: Dyspnea on exertion    COMPARISON: None.     FINDINGS: Moderate hiatal hernia. There are no acute infiltrates. The  cardiac silhouette is not enlarged. Pulmonary vasculature is  unremarkable.      Impression    IMPRESSION: No acute disease.    CESARIO ROB MD

## 2019-06-05 NOTE — LETTER
[facility]  [address] [address]                  [phone]   June 11, 2019    Vanessa Mckeon  [address]   [place] [address]        Vanessa,     Attached are all your labs and results. As we talked about, things look good. If things are better, continue to focus on weight loss and diet. If not, come back on Wednesday June 12th to discuss further options.     Feel free to call the clinic with any questions.     Nitish Still MD   [facility]       Results for orders placed or performed in visit on 06/05/19   XR Chest 2 Views    Narrative    CHEST TWO VIEWS 6/5/2019 1:55 PM     HISTORY: Dyspnea on exertion    COMPARISON: None.     FINDINGS: Moderate hiatal hernia. There are no acute infiltrates. The  cardiac silhouette is not enlarged. Pulmonary vasculature is  unremarkable.      Impression    IMPRESSION: No acute disease.    CESARIO ROB MD

## 2019-06-05 NOTE — PATIENT INSTRUCTIONS
Shortness of breath  Possibilities include lung (unlikely), infection (less likely), heart (labs today) & deconditioning (weight gain)    - EKG looks good  - Chest Xray shows some scarring at the left lung base which appears to have been there before. We'll keep an eye on this moving forward  - Labs today: Hemoglobin, BNP (heart protein)    Work on weight loss: Can talk to the dietician, fruits, vegetables, lean meats

## 2019-06-05 NOTE — PROGRESS NOTES
Subjective     Vanessa Mckeon is a 63 year old female who presents to clinic today for the following health issues:    HPI   Possible Low Iron      Duration: 2 weeks     Description (location/character/radiation): Tired, fatigue, shortness of breath, no appetitie     Intensity:  Fatigue-severe, SOB moderate    Accompanying signs and symptoms: winded just vacuuming     History (similar episodes/previous evaluation): hx of low iron     Precipitating or alleviating factors: exertion makes SOB worse     Therapies tried and outcome: None     Patient with 2 weeks of worsening dyspnea on exertion. Able to ambulate fine on flat ground but with increasing difficulty in climbing stairs. Denies chest pain, calf pain, palpitations, dizziness, light headedness, or weakness. Notes mild ankle/foot swelling during this time, not worse at night. Denies PND or abdominal swelling. Sleeps on 2 pillows without change. Is a stomach sleeper due to back discomfort.    She has a history of anemia (Hgb 4.9 in Feb 2015) requiring transfusions, but Hgb has been stable for the last few years, most recently 15.2 in Oct 2018. Former smoker, <10 pack year history. Had a URI 3 weeks ago, but asymptomatic now. Denies fevers, chills, cough, rhinorrhea, or pleuritic chest pain. Her weight has increased from 184lb to 198lbs since October (was stable from 184-186 for a few years prior).    She does have a family history of heart disease - father had a bypass at age 78 and a paternal uncle had a fatal MI at age 50. She does have elevated cholesterol (254 in 2017) and has not been on a statin. Prior stress ECHO in 2010 in the setting of chest pain was normal with normal EF.     Patient Active Problem List   Diagnosis     COLON POLYPS     HYPERLIPIDEMIA LDL GOAL <160     Lumbago     Hiatal hernia     Plantar fasciitis, bilateral     Hypothyroidism, unspecified type     Iron deficiency anemia, unspecified iron deficiency anemia type     Gastroesophageal  reflux disease without esophagitis     Cervical high risk HPV (human papillomavirus) test positive     Class 1 obesity due to excess calories without serious comorbidity with body mass index (BMI) of 33.0 to 33.9 in adult     Past Surgical History:   Procedure Laterality Date     BIOPSY OF BREAST, INCISIONAL  2001    left breast     C EXPLORATORY OF ABDOMEN  1980    Laparotomy, thought she had ovarian cyst, but nothing was found     COLONOSCOPY  10/8/2011    Procedure:COMBINED COLONOSCOPY, SINGLE BIOPSY/POLYPECTOMY BY BIOPSY; COLONOSCOPY; Surgeon:DARIANA RUIZ; Location: GI     COLONOSCOPY N/A 10/13/2015    Procedure: COLONOSCOPY;  Surgeon: Toi Sotelo MD;  Location:  GI     PHACOEMULSIFICATION CLEAR CORNEA WITH STANDARD INTRAOCULAR LENS IMPLANT  10/4/2012    Procedure: PHACOEMULSIFICATION CLEAR CORNEA WITH STANDARD INTRAOCULAR LENS IMPLANT;  RIGHT PHACOEMULSIFICATION CLEAR CORNEA WITH STANDARD INTRAOCULAR LENS IMPLANT;  Surgeon: Óscar Torrez MD;  Location: [facility]     PHACOEMULSIFICATION CLEAR CORNEA WITH STANDARD INTRAOCULAR LENS IMPLANT  2012    Procedure: PHACOEMULSIFICATION CLEAR CORNEA WITH STANDARD INTRAOCULAR LENS IMPLANT;  LEFT  PHACOEMULSIFICATION CLEAR CORNEA WITH STANDARD INTRAOCULAR LENS IMPLANT ;  Surgeon: Óscar Torrez MD;  Location: [facility]     SURGICAL HISTORY OF -       removal of skin cancer       Social History     Tobacco Use     Smoking status: Former Smoker     Years: 10.00     Last attempt to quit: 2003     Years since quittin.1     Smokeless tobacco: Never Used   Substance Use Topics     Alcohol use: Yes     Comment: rarely     Family History   Problem Relation Age of Onset     Diabetes Father         adult     Neurologic Disorder Father         parkinsons     Cancer Father 95        lung     Heart Disease Father      C.A.D. Father      Heart Disease Mother      Hypertension Mother      Breast Cancer Mother      Thyroid Disease Mother      Lung Cancer  Mother      Cancer Mother      Thyroid Disease Sister      Thyroid Disease Sister      Heart Disease Paternal Uncle         2 uncles MI         Reviewed and updated as needed this visit by Provider         Review of Systems   ROS COMP: Constitutional, HEENT, cardiovascular, pulmonary, gi and gu systems are negative, except as otherwise noted.      Objective    /82 (BP Location: Right arm, Patient Position: Chair, Cuff Size: Adult Regular)   Pulse 77   Temp 98.2  F (36.8  C) (Oral)   Resp 16   Wt 90.1 kg (198 lb 11.2 oz)   LMP 05/19/2008   SpO2 95%   BMI 35.76 kg/m    Body mass index is 35.76 kg/m .  Physical Exam   GENERAL: healthy, alert and no distress  EYES: Eyes grossly normal to inspection, PERRL and conjunctivae and sclerae normal  HENT: ear canals and TM's normal, nose and mouth without ulcers or lesions  NECK: no adenopathy, no asymmetry, masses, or scars  RESP: Breathing comfortably on room air. Fine bibasilar crackles. Lungs otherwise clear to auscultation - no rales, rhonchi or wheezes  CV: regular rate and rhythm, normal S1 S2, no S3, no murmurs, click or rub, no peripheral edema and peripheral pulses strong  ABDOMEN: soft, nontender, nondistended  MS: Trace edema of bilateral ankles & feet  PSYCH: mentation appears normal, affect normal/bright    Diagnostic Test Results:  Labs reviewed in Epic        Assessment & Plan   Vanessa Mckeon is a 63 year old woman who presents today for evaluation of dyspnea on exertion. Differential includes cardiac pathology (early CHF, ACS, aortic stenosis), pulmonary process (IPF, PNA), Anemia, infection (recent URI), or deconditioning (18lb weight gain over last 8 months). Discussed with patient the workup including CXR, EKG, CBC, & BNP. CXR with stable (at least per comparison to prior reads in 2010 - unable to see imaging) left lower lung base scarring. EKG with NSR. Labs pending. Exam unrevealing with only trace pedal edema. ASCVD risk score of 4.8% with  most recent information. She appears well with good saturations today. Reassuring is her ability to walk further distances on flat surfaces.    The 10-year ASCVD risk score (Alethea DC Jr., et al., 2013) is: 4.8%    Values used to calculate the score:      Age: 63 years      Sex: Female      Is Non- : No      Diabetic: No      Tobacco smoker: No      Systolic Blood Pressure: 128 mmHg      Is BP treated: No      HDL Cholesterol: 66 mg/dL      Total Cholesterol: 254 mg/dL    1. Dyspnea on exertion  RR 16, Sats 95% on RA today. Fine bibasilar crackles & only trace pedal edema on exam. CXR with left lower lobe ill defined opacity which was noted on 2010 CXR read. EKG unremarkable. Both reviewed with patient today. Will follow up CBC & BNP from today.  - Reviewed warning signs to prompt earlier re-evaluation. Otherwise will see her in 1 week as primary does not have clinic availability for the next month  - Counseled on deconditioning and used motivational interviewing to identify carbs and portions as actionable steps  - If labs normal, will consider ECHO  - May need CT in the future to further monitor LLL opacity    Return in about 1 week (around 6/12/2019). Will see me in 1 week    Nitish Still MD  [facility]    I have discussed the patient with the resident and agree with the jointly developed plan as documented above    Sasha Rivero MD  Internal Medicine - Pediatrics

## 2019-06-05 NOTE — LETTER
[facility]  [address] [address]                  [phone]   June 11, 2019    Vanessa Mckeon  [address]   [place] [address]      Vanessa,     Attached are all your labs and results. As we talked about, things look good. If things are better, continue to focus on weight loss and diet. If not, come back on Wednesday June 12th to discuss further options.     Feel free to call the clinic with any questions.     Nitish Still MD   [facility]       Results for orders placed or performed in visit on 06/05/19   XR Chest 2 Views    Narrative    CHEST TWO VIEWS 6/5/2019 1:55 PM     HISTORY: Dyspnea on exertion    COMPARISON: None.     FINDINGS: Moderate hiatal hernia. There are no acute infiltrates. The  cardiac silhouette is not enlarged. Pulmonary vasculature is  unremarkable.      Impression    IMPRESSION: No acute disease.    CESARIO ROB MD

## 2019-06-05 NOTE — LETTER
[facility]  [address] [address]                  [phone]   June 11, 2019    Vanessa Mckeon  [address]   [place] [address]      Vanessa,     Attached are all your labs and results. As we talked about, things look good. If things are better, continue to focus on weight loss and diet. If not, come back on Wednesday June 12th to discuss further options.     Feel free to call the clinic with any questions.     Nitish Still MD   [facility]       Results for orders placed or performed in visit on 06/05/19   XR Chest 2 Views    Narrative    CHEST TWO VIEWS 6/5/2019 1:55 PM     HISTORY: Dyspnea on exertion    COMPARISON: None.     FINDINGS: Moderate hiatal hernia. There are no acute infiltrates. The  cardiac silhouette is not enlarged. Pulmonary vasculature is  unremarkable.      Impression    IMPRESSION: No acute disease.    CESARIO ROB MD

## 2019-06-05 NOTE — TELEPHONE ENCOUNTER
The pt is aware and scheduled for her upcoming appointment this afternoon.   Heather Womack on 6/5/2019 at 12:52 PM

## 2019-06-06 LAB — NT-PROBNP SERPL-MCNC: 51 PG/ML (ref 0–125)

## 2019-06-09 NOTE — RESULT ENCOUNTER NOTE
Vanessa,    Attached are all your labs and results. As we talked about, things look good. If things are better, continue to focus on weight loss and diet. If not, come back on Wednesday June 12th to discuss further options.    Feel free to call the clinic with any questions.    Nitish Still MD  [facility]

## 2019-06-09 NOTE — RESULT ENCOUNTER NOTE
Please mail the following note attached to her labs. Patient was also called with the results on 6/9/19 at 14:20.    Vanessa,    Attached are all your labs and results. As we talked about, things look good. If things are better, continue to focus on weight loss and diet. If not, come back on Wednesday June 12th to discuss further options.    Feel free to call the clinic with any questions.    Nitish Still MD  [facility]

## 2019-10-24 ENCOUNTER — OFFICE VISIT (OUTPATIENT)
Dept: PEDIATRICS | Facility: CLINIC | Age: 64
End: 2019-10-24
Payer: OTHER GOVERNMENT

## 2019-10-24 ENCOUNTER — PATIENT OUTREACH (OUTPATIENT)
Dept: CARE COORDINATION | Facility: CLINIC | Age: 64
End: 2019-10-24

## 2019-10-24 VITALS
RESPIRATION RATE: 16 BRPM | TEMPERATURE: 97.6 F | WEIGHT: 197.4 LBS | SYSTOLIC BLOOD PRESSURE: 124 MMHG | OXYGEN SATURATION: 94 % | HEIGHT: 62 IN | DIASTOLIC BLOOD PRESSURE: 74 MMHG | BODY MASS INDEX: 36.33 KG/M2 | HEART RATE: 82 BPM

## 2019-10-24 DIAGNOSIS — E03.9 HYPOTHYROIDISM, UNSPECIFIED TYPE: ICD-10-CM

## 2019-10-24 DIAGNOSIS — Z00.00 ROUTINE GENERAL MEDICAL EXAMINATION AT A HEALTH CARE FACILITY: Primary | ICD-10-CM

## 2019-10-24 DIAGNOSIS — Z12.4 SCREENING FOR MALIGNANT NEOPLASM OF CERVIX: ICD-10-CM

## 2019-10-24 DIAGNOSIS — Z63.0 MARITAL CONFLICT: ICD-10-CM

## 2019-10-24 DIAGNOSIS — R10.9 STOMACH PAIN: ICD-10-CM

## 2019-10-24 PROCEDURE — G0145 SCR C/V CYTO,THINLAYER,RESCR: HCPCS | Performed by: NURSE PRACTITIONER

## 2019-10-24 PROCEDURE — 80061 LIPID PANEL: CPT | Performed by: NURSE PRACTITIONER

## 2019-10-24 PROCEDURE — 36415 COLL VENOUS BLD VENIPUNCTURE: CPT | Performed by: NURSE PRACTITIONER

## 2019-10-24 PROCEDURE — 84443 ASSAY THYROID STIM HORMONE: CPT | Performed by: NURSE PRACTITIONER

## 2019-10-24 PROCEDURE — 84439 ASSAY OF FREE THYROXINE: CPT | Performed by: NURSE PRACTITIONER

## 2019-10-24 PROCEDURE — 99396 PREV VISIT EST AGE 40-64: CPT | Performed by: NURSE PRACTITIONER

## 2019-10-24 PROCEDURE — 99214 OFFICE O/P EST MOD 30 MIN: CPT | Mod: 25 | Performed by: NURSE PRACTITIONER

## 2019-10-24 PROCEDURE — G0476 HPV COMBO ASSAY CA SCREEN: HCPCS | Performed by: NURSE PRACTITIONER

## 2019-10-24 RX ORDER — LEVOTHYROXINE SODIUM 75 UG/1
75 TABLET ORAL DAILY
Qty: 90 TABLET | Status: SHIPPED | OUTPATIENT
Start: 2019-10-24 | End: 2020-06-30

## 2019-10-24 SDOH — HEALTH STABILITY: PHYSICAL HEALTH: ON AVERAGE, HOW MANY DAYS PER WEEK DO YOU ENGAGE IN MODERATE TO STRENUOUS EXERCISE (LIKE A BRISK WALK)?: 0 DAYS

## 2019-10-24 SDOH — HEALTH STABILITY: MENTAL HEALTH: HOW OFTEN DO YOU HAVE A DRINK CONTAINING ALCOHOL?: MONTHLY OR LESS

## 2019-10-24 SDOH — SOCIAL STABILITY: SOCIAL NETWORK: HOW OFTEN DO YOU ATTENT MEETINGS OF THE CLUB OR ORGANIZATION YOU BELONG TO?: 1 TO 4 TIMES PER YEAR

## 2019-10-24 SDOH — ECONOMIC STABILITY: TRANSPORTATION INSECURITY
IN THE PAST 12 MONTHS, HAS LACK OF TRANSPORTATION KEPT YOU FROM MEETINGS, WORK, OR FROM GETTING THINGS NEEDED FOR DAILY LIVING?: NO

## 2019-10-24 SDOH — SOCIAL STABILITY: SOCIAL NETWORK
IN A TYPICAL WEEK, HOW MANY TIMES DO YOU TALK ON THE PHONE WITH FAMILY, FRIENDS, OR NEIGHBORS?: MORE THAN THREE TIMES A WEEK

## 2019-10-24 SDOH — SOCIAL STABILITY: SOCIAL NETWORK: ARE YOU MARRIED, WIDOWED, DIVORCED, SEPARATED, NEVER MARRIED, OR LIVING WITH A PARTNER?: MARRIED

## 2019-10-24 SDOH — ECONOMIC STABILITY: FOOD INSECURITY: WITHIN THE PAST 12 MONTHS, THE FOOD YOU BOUGHT JUST DIDN'T LAST AND YOU DIDN'T HAVE MONEY TO GET MORE.: NEVER TRUE

## 2019-10-24 SDOH — ECONOMIC STABILITY: TRANSPORTATION INSECURITY
IN THE PAST 12 MONTHS, HAS THE LACK OF TRANSPORTATION KEPT YOU FROM MEDICAL APPOINTMENTS OR FROM GETTING MEDICATIONS?: NO

## 2019-10-24 SDOH — HEALTH STABILITY: MENTAL HEALTH: HOW OFTEN DO YOU HAVE 6 OR MORE DRINKS ON ONE OCCASION?: NEVER

## 2019-10-24 SDOH — SOCIAL STABILITY: SOCIAL NETWORK
DO YOU BELONG TO ANY CLUBS OR ORGANIZATIONS SUCH AS CHURCH GROUPS UNIONS, FRATERNAL OR ATHLETIC GROUPS, OR SCHOOL GROUPS?: YES

## 2019-10-24 SDOH — HEALTH STABILITY: MENTAL HEALTH: HOW MANY STANDARD DRINKS CONTAINING ALCOHOL DO YOU HAVE ON A TYPICAL DAY?: 1 OR 2

## 2019-10-24 SDOH — HEALTH STABILITY: PHYSICAL HEALTH: ON AVERAGE, HOW MANY MINUTES DO YOU ENGAGE IN EXERCISE AT THIS LEVEL?: 0 MIN

## 2019-10-24 SDOH — SOCIAL STABILITY: SOCIAL NETWORK: HOW OFTEN DO YOU ATTEND CHURCH OR RELIGIOUS SERVICES?: MORE THAN 4 TIMES PER YEAR

## 2019-10-24 SDOH — SOCIAL STABILITY - SOCIAL INSECURITY: PROBLEMS IN RELATIONSHIP WITH SPOUSE OR PARTNER: Z63.0

## 2019-10-24 SDOH — ECONOMIC STABILITY: FOOD INSECURITY: WITHIN THE PAST 12 MONTHS, YOU WORRIED THAT YOUR FOOD WOULD RUN OUT BEFORE YOU GOT MONEY TO BUY MORE.: NEVER TRUE

## 2019-10-24 SDOH — SOCIAL STABILITY: SOCIAL NETWORK: HOW OFTEN DO YOU GET TOGETHER WITH FRIENDS OR RELATIVES?: ONCE A WEEK

## 2019-10-24 SDOH — HEALTH STABILITY: MENTAL HEALTH
STRESS IS WHEN SOMEONE FEELS TENSE, NERVOUS, ANXIOUS, OR CAN'T SLEEP AT NIGHT BECAUSE THEIR MIND IS TROUBLED. HOW STRESSED ARE YOU?: ONLY A LITTLE

## 2019-10-24 SDOH — ECONOMIC STABILITY: INCOME INSECURITY: HOW HARD IS IT FOR YOU TO PAY FOR THE VERY BASICS LIKE FOOD, HOUSING, MEDICAL CARE, AND HEATING?: NOT HARD AT ALL

## 2019-10-24 ASSESSMENT — ENCOUNTER SYMPTOMS
HEMATOCHEZIA: 0
EYE PAIN: 0
DIARRHEA: 0
NERVOUS/ANXIOUS: 0
COUGH: 0
FREQUENCY: 0
FEVER: 0
CHILLS: 0
HEMATURIA: 0
CONSTIPATION: 0
DIZZINESS: 0

## 2019-10-24 ASSESSMENT — MIFFLIN-ST. JEOR: SCORE: 1405.03

## 2019-10-24 NOTE — LETTER
[facility]  [address] [address]                  [phone]   October 28, 2019    Vanessa Mckeon  [address]   [place] [address]      Dear Vanessa,    Here is a summary of your recent test results:    Your cholesterol results are attached and are borderline high. At this point, I think we should hold off on starting cholesterol medication and instead focus on diet and exercise. Focus on a low saturated fat diet high in dietary fiber. We should repeat this lab again in about 6 months to see if it makes much difference. If not, we should discuss the potential benefit of medications.     On another note, your thyroid levels were slightly high. I don't think we should change your dose now - make sure you're taking the synthroid on an empty stomach and we can repeat this lab again in 2 months.    Your test results are enclosed.      Please contact me if you have any questions.           Thank you very much for choosing [facility]    Best regards,    ELIEL Garcia        Results for orders placed or performed in visit on 10/24/19   TSH WITH FREE T4 REFLEX   Result Value Ref Range    TSH 4.65 (H) 0.40 - 4.00 mU/L   Lipid panel reflex to direct LDL Fasting   Result Value Ref Range    Cholesterol 270 (H) <200 mg/dL    Triglycerides 175 (H) <150 mg/dL    HDL Cholesterol 56 >49 mg/dL    LDL Cholesterol Calculated 179 (H) <100 mg/dL    Non HDL Cholesterol 214 (H) <130 mg/dL   T4 free   Result Value Ref Range    T4 Free 1.01 0.76 - 1.46 ng/dL
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
## 2019-10-25 DIAGNOSIS — R10.9 STOMACH PAIN: ICD-10-CM

## 2019-10-25 LAB
CHOLEST SERPL-MCNC: 270 MG/DL
HDLC SERPL-MCNC: 56 MG/DL
LDLC SERPL CALC-MCNC: 179 MG/DL
NONHDLC SERPL-MCNC: 214 MG/DL
T4 FREE SERPL-MCNC: 1.01 NG/DL (ref 0.76–1.46)
TRIGL SERPL-MCNC: 175 MG/DL
TSH SERPL DL<=0.005 MIU/L-ACNC: 4.65 MU/L (ref 0.4–4)

## 2019-10-25 PROCEDURE — 87338 HPYLORI STOOL AG IA: CPT | Performed by: NURSE PRACTITIONER

## 2019-10-28 LAB — H PYLORI AG STL QL IA: NEGATIVE

## 2019-10-29 NOTE — PROGRESS NOTES
Clinic Care Coordination Contact  [facility]/Voicemail    Referral Source: PCP  Clinical Data: Care Coordinator Outreach  Patient had Voice mail from patient.  Returned call.  Outreach attempted x 2.  Left message on patient's voicemail with call back information and requested return call.  Plan:  Care Coordinator will try to reach patient again in 3-5 business days.      Gisella Barksdale RN  Care Coordination  Phone:  [phone]  Email: [email or web address]  [facility] [place], [place] and [facility]

## 2019-10-29 NOTE — PROGRESS NOTES
Clinic Care Coordination Contact    Patient returned call, requested face to face visit with RN CC.  Appt with RN CC scheduled for 11/5 at 0930.    Gisella Barksdale RN  Care Coordination  Phone:  [phone]  Email: [email or web address]

## 2019-11-01 LAB
COPATH REPORT: NORMAL
PAP: NORMAL

## 2019-11-05 ENCOUNTER — TELEPHONE (OUTPATIENT)
Dept: PEDIATRICS | Facility: CLINIC | Age: 64
End: 2019-11-05

## 2019-11-05 ENCOUNTER — ALLIED HEALTH/NURSE VISIT (OUTPATIENT)
Dept: NURSING | Facility: CLINIC | Age: 64
End: 2019-11-05
Payer: OTHER GOVERNMENT

## 2019-11-05 DIAGNOSIS — Z23 NEED FOR VACCINATION: Primary | ICD-10-CM

## 2019-11-05 DIAGNOSIS — R10.9 STOMACH PAIN: Primary | ICD-10-CM

## 2019-11-05 PROCEDURE — 90750 HZV VACC RECOMBINANT IM: CPT

## 2019-11-05 PROCEDURE — 90471 IMMUNIZATION ADMIN: CPT

## 2019-11-05 NOTE — PROGRESS NOTES
Prior to immunization administration, verified patients identity using patient s name and date of birth. Please see Immunization Activity for additional information.     Screening Questionnaire for Adult Immunization    Are you sick today?   No   Do you have allergies to medications, food, a vaccine component or latex?   No   Have you ever had a serious reaction after receiving a vaccination?   No   Do you have a long-term health problem with heart disease, lung disease, asthma, kidney disease, metabolic disease (e.g. diabetes), anemia, or other blood disorder?   No   Do you have cancer, leukemia, HIV/AIDS, or any other immune system problem?   No   In the past 3 months, have you taken medications that affect  your immune system, such as prednisone, other steroids, or anticancer drugs; drugs for the treatment of rheumatoid arthritis, Crohn s disease, or psoriasis; or have you had radiation treatments?   No   Have you had a seizure, or a brain or other nervous system problem?   No   During the past year, have you received a transfusion of blood or blood     products, or been given immune (gamma) globulin or antiviral drug?   No   For women: Are you pregnant or is there a chance you could become        pregnant during the next month?   No   Have you received any vaccinations in the past 4 weeks?   No     Immunization questionnaire answers were all negative.        Per orders of Tatiana Angel, injection of shingles #1 given by Elsa Hernandez CMA. Patient instructed to remain in clinic for 15 minutes afterwards, and to report any adverse reaction to me immediately.       Screening performed by Elsa Hernandez CMA on 11/5/2019 at 10:37 AM.

## 2019-11-05 NOTE — TELEPHONE ENCOUNTER
Chacorta Simpson-   I spoke with the patient today regarding her recent pap/HPV results and recommendation for Colposcopy.    She asked about her stool test for H. Pylori and I advised her of the negative results and that a letter was also sent out to her.    She is wondering now what she should/could do for follow up on this stomach pain as she is still experiencing the same symptoms?  Is there any other follow up testing you would like to do?    Please advise and send on to your care team to contact the patient.  We can call her back at [phone] (home)     Thank you  Lynette Nissen RN

## 2019-11-05 NOTE — TELEPHONE ENCOUNTER
The pt is aware of the message below and has no further questions at this time.   Heather Womack on 11/5/2019 at 3:26 PM

## 2019-11-05 NOTE — TELEPHONE ENCOUNTER
MA/TC: Please advise pt to f/u with GI for her on-going concerns. Thanks.    Preferred Location: [place] [facility]: [phone]    Marely, RN  Triage Nurse

## 2019-11-18 NOTE — PROGRESS NOTES
Clinic Care Coordination Contact    Per Patient request, rescheduled for face to face visit to 11/21/19    Gisella Barksdale RN  Care Coordination  Phone:  [phone]  Email: [email or web address], [place], [place] and [facility]

## 2019-11-21 ENCOUNTER — ANCILLARY PROCEDURE (OUTPATIENT)
Dept: MAMMOGRAPHY | Facility: CLINIC | Age: 64
End: 2019-11-21
Payer: OTHER GOVERNMENT

## 2019-11-21 ENCOUNTER — ALLIED HEALTH/NURSE VISIT (OUTPATIENT)
Dept: NURSING | Facility: CLINIC | Age: 64
End: 2019-11-21
Payer: OTHER GOVERNMENT

## 2019-11-21 DIAGNOSIS — Z65.8 PSYCHOSOCIAL DISTRESS: Primary | ICD-10-CM

## 2019-11-21 DIAGNOSIS — Z12.31 VISIT FOR SCREENING MAMMOGRAM: ICD-10-CM

## 2019-11-21 PROCEDURE — 99207 ZZC NO BILLABLE SERVICE THIS VISIT: CPT

## 2019-11-21 PROCEDURE — 77067 SCR MAMMO BI INCL CAD: CPT | Mod: TC

## 2019-11-21 ASSESSMENT — ACTIVITIES OF DAILY LIVING (ADL): DEPENDENT_IADLS:: INDEPENDENT
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
## 2019-11-22 ENCOUNTER — TELEPHONE (OUTPATIENT)
Dept: BEHAVIORAL HEALTH | Facility: CLINIC | Age: 64
End: 2019-11-22

## 2019-11-22 NOTE — TELEPHONE ENCOUNTER
Phone Encounter  [facility] attempted to reach patient, by PCP request. LM requesting a returned call and provided call back number.    Tamera Lopez, TADEO, [facility]

## 2019-12-03 ENCOUNTER — OFFICE VISIT (OUTPATIENT)
Dept: OBGYN | Facility: CLINIC | Age: 64
End: 2019-12-03
Payer: OTHER GOVERNMENT

## 2019-12-03 VITALS
WEIGHT: 196 LBS | DIASTOLIC BLOOD PRESSURE: 76 MMHG | SYSTOLIC BLOOD PRESSURE: 128 MMHG | BODY MASS INDEX: 36.07 KG/M2 | HEIGHT: 62 IN

## 2019-12-03 DIAGNOSIS — N84.1 ENDOCERVICAL POLYP: ICD-10-CM

## 2019-12-03 DIAGNOSIS — R87.810 CERVICAL HIGH RISK HPV (HUMAN PAPILLOMAVIRUS) TEST POSITIVE: Primary | ICD-10-CM

## 2019-12-03 DIAGNOSIS — Z11.3 SCREEN FOR STD (SEXUALLY TRANSMITTED DISEASE): ICD-10-CM

## 2019-12-03 PROCEDURE — 57454 BX/CURETT OF CERVIX W/SCOPE: CPT | Performed by: OBSTETRICS & GYNECOLOGY

## 2019-12-03 PROCEDURE — 87491 CHLMYD TRACH DNA AMP PROBE: CPT | Performed by: OBSTETRICS & GYNECOLOGY

## 2019-12-03 PROCEDURE — 87591 N.GONORRHOEAE DNA AMP PROB: CPT | Performed by: OBSTETRICS & GYNECOLOGY

## 2019-12-03 PROCEDURE — 88305 TISSUE EXAM BY PATHOLOGIST: CPT | Performed by: OBSTETRICS & GYNECOLOGY

## 2019-12-03 ASSESSMENT — MIFFLIN-ST. JEOR: SCORE: 1392.3

## 2019-12-03 NOTE — PROGRESS NOTES
Colposcopy Note    Past History:     Patient's last menstrual period was 2008.  Patient is not pregnant.     Previous Abnormal Pap Hx:  Abnormal Pap dated:  Pap-WNL, Pos non-16/18 HPV  Prior Colposcopy dated: 10/26/18 No AWE lesions on ectoCx, ECC poss VIK 1   Prior Treatment dated: N/A    Indications:  Encounter Diagnosis   Name Primary?     Cervical high risk HPV (human papillomavirus) test positive Yes       PROCEDURE:  The procedure was explained, and informed consent obtained. The patient was placed in the dorsal lithotomy position. A vaginal speculum was placed. A 5 mm endocervical polyp was seen protruding from the ext os.  A GC/Chlamydia culture was obtained.  The polyp was grasped with a Dru stone forceps and with gentle twisting released from its stalk. Dilute acetic acid was applied to cervix. The exocervix was visualized. The transformation zone was visualized satisfactorily. Colposcopy was done with white light and with the Judy light. Endocervical curettage was done. Biopsy done at the 6 o clock position(s) Colposcopy of vagina revealed: normal. The patient tolerated the procedure well. At the completion of the procedure, only scant bleeding was present. Hemostasis was achieved with Monsel's solution.    FINDINGS:  White epithelium @ 6 o clock position(s).  Punctation: absent  Mosaicism: absent    Physical Exam  Genitourinary:              ASSESSMENT:  Encounter Diagnosis   Name Primary?     Cervical high risk HPV (human papillomavirus) test positive Yes       PLAN:  If Bx shows VIK 1 or less, follow-up with Ob/Gyn per ASCCP Guideline in 1 year for Pap & HPV cotest at next annual exam.     Procedure Planned:   If HGSIL, consider Laser vap if isolated to ectoCx, o/w LEEP in OR due to need for better exposure in Pt who is moderately obese.    Barrie Fox MD

## 2019-12-03 NOTE — NURSING NOTE
"Chief Complaint   Patient presents with     Colposcopy     +Other HPV        Initial /76 (BP Location: Right arm, Patient Position: Sitting, Cuff Size: Adult Regular)   Ht 1.575 m (5' 2\")   Wt 88.9 kg (196 lb)   LMP 2008   BMI 35.85 kg/m   Estimated body mass index is 35.85 kg/m  as calculated from the following:    Height as of this encounter: 1.575 m (5' 2\").    Weight as of this encounter: 88.9 kg (196 lb).  BP completed using cuff size: regular    Questioned patient about current smoking habits.  Pt. has never smoked.          The following HM Due: NONE    Dameon Brown CMA                "

## 2019-12-04 LAB
C TRACH DNA SPEC QL NAA+PROBE: NEGATIVE
N GONORRHOEA DNA SPEC QL NAA+PROBE: NEGATIVE
SPECIMEN SOURCE: NORMAL
SPECIMEN SOURCE: NORMAL

## 2019-12-05 ENCOUNTER — TRANSFERRED RECORDS (OUTPATIENT)
Dept: HEALTH INFORMATION MANAGEMENT | Facility: CLINIC | Age: 64
End: 2019-12-05

## 2019-12-05 LAB — COPATH REPORT: NORMAL

## 2019-12-06 NOTE — RESULT ENCOUNTER NOTE
Please advise patient her colposcopic biopsy from her cervix did show mild dysplasia.  This is not cervical cancer.  It is a very mild pre-cancerous change that usually will resolve on its own without treatment in the course of a year or so.  She needs to follow up in 1 year for her annual exam, and at that time a Pap with HPV DNA test will be done again from her cervix to check to see if the cervix cells are normal and the HPV virus has been eradicated.  If so she will resume routine screening 3 years later.  As an aside, her endocervical polyp that was removed at the same time was benign as expected.  She can let me know if she has any other questions.    Barrie Fox MD

## 2019-12-20 ENCOUNTER — PATIENT OUTREACH (OUTPATIENT)
Dept: CARE COORDINATION | Facility: CLINIC | Age: 64
End: 2019-12-20

## 2019-12-20 NOTE — LETTER
[place] CARE COORDINATION  [address] [place] [address]    1/2/2020    Vanessa Mckeon  [address]   [place] [address]      Dear Vanessa,    I have been unsuccessful in reaching you since our last contact. At this time I will make no further attempts to reach you, however this does not change your ability to continue receiving care from your providers at [place]. If you are needing additional support from a care coordinator in the future please contact me at [phone].    All of us at [facility] are invested in your health and are here to assist you in meeting your goals.     Sincerely,    Gisella Barksdale RN  Care Coordination  Phone:  [phone]  Email: [email or web address]  [facility], [place], [place] and [facility]

## 2019-12-20 NOTE — PROGRESS NOTES
Clinic Care Coordination Contact  [facility]/Voicemail       Clinical Data: Care Coordinator Follow up Outreach  Outreach attempted x 1.  Left message on patient's voicemail with call back information and requested return call.  Plan: Care Coordinator will try to reach patient again in 3-5 business days.    Gisella Barksdale RN  Care Coordination  Phone:  [phone]  Email: [email or web address]  [facility], [place], [place] and [facility]

## 2020-01-02 NOTE — PROGRESS NOTES
Clinic Care Coordination Contact  [facility]/Voicemail       Clinical Data: Care Coordinator Outreach  Outreach attempted x 2.  Left message on patient's voicemail with call back information and requested return call.  Plan: Care Coordinator will send unable to contact letter with care coordinator contact information via mail. Care Coordinator will do no further outreaches at this time.  Handoff given to SAMANTHA Mera and he will try to f/u again in 2 weeks.    Gisella Barksdale RN  Care Coordination  Phone:  [phone]  Email: [email or web address]  [facility], [place] and [facility]

## 2020-01-03 PROBLEM — R87.810 CERVICAL HIGH RISK HPV (HUMAN PAPILLOMAVIRUS) TEST POSITIVE: Status: ACTIVE | Noted: 2017-10-02

## 2020-01-03 PROBLEM — N87.0 MILD DYSPLASIA OF CERVIX: Status: ACTIVE | Noted: 2019-12-03

## 2020-01-16 ENCOUNTER — PATIENT OUTREACH (OUTPATIENT)
Dept: PEDIATRICS | Facility: CLINIC | Age: 65
End: 2020-01-16

## 2020-01-16 NOTE — TELEPHONE ENCOUNTER
"F/u with pt regarding resources through  and [facility]. Pt reports she is doing well. Asked pt if she received help regarding her marriage. She notes that she spoke to someone from  but didn't find it to be helpful. Offered to reconnect pt to  but pt denies at this time noting she is \"not in need\" of their services. Also discussed seeing Tamera for therapy and pt declines at this time. Encouraged pt to reach out to the clinic if she changes her mind or if anything comes up. Pt verbalizes understanding.    Ede Padilla, EMT at 9:24 AM on January 16, 2020   [facility] Health Guide   [phone]    "

## 2020-05-29 ENCOUNTER — ALLIED HEALTH/NURSE VISIT (OUTPATIENT)
Dept: NURSING | Facility: CLINIC | Age: 65
End: 2020-05-29
Payer: OTHER GOVERNMENT

## 2020-05-29 DIAGNOSIS — Z23 NEED FOR SHINGLES VACCINE: Primary | ICD-10-CM

## 2020-05-29 PROCEDURE — 90750 HZV VACC RECOMBINANT IM: CPT

## 2020-05-29 PROCEDURE — 90471 IMMUNIZATION ADMIN: CPT

## 2020-06-12 NOTE — PROGRESS NOTES
"Vanessa Mckeon is a 64 year old female who is being evaluated via a billable telephone visit.      The patient has been notified of following:     \"This telephone visit will be conducted via a call between you and your physician/provider. We have found that certain health care needs can be provided without the need for a physical exam.  This service lets us provide the care you need with a short phone conversation.  If a prescription is necessary we can send it directly to your pharmacy.  If lab work is needed we can place an order for that and you can then stop by our lab to have the test done at a later time.    Telephone visits are billed at different rates depending on your insurance coverage. During this emergency period, for some insurers they may be billed the same as an in-person visit.  Please reach out to your insurance provider with any questions.    If during the course of the call the physician/provider feels a telephone visit is not appropriate, you will not be charged for this service.\"    Patient has given verbal consent for Telephone visit?  Yes    What phone number would you like to be contacted at? [phone]    How would you like to obtain your AVS? Mail a copy    Subjective     Vanessa Mckeon is a 64 year old female who presents via phone visit today for the following health issues:    HPI  Gastrointestinal symptoms - concerned about possible ulcer       Duration: worsening 8 months    Description:  REFLUX SYMPTOMS - belching, hiccupes and nausea  ABDOMINAL PAIN - epigastric area    Pain is described as burning and discomfort especially after meals; pt has associated gas pain      Intensity:  Mild to moderate    Accompanying signs and symptoms:  Nausea, hunger pangs even after pt just finished eating, fatigue, indigestion, loss of appetite, and shortness of breath    History  Previous similar problem: no   Previous evaluation:  none    Aggravating factors: spicy food/any meals    Alleviating " factors: avoidance of eating does not worsen symptoms but does not provide relief    Other Therapies tried: Omeprazole without relief    She did have consultation with [facility] in December, and recommended 30 day sof PPI and if no improvement consider EGD. She did one month PPI and no improvement. She is no longer taking aleve.     Pt wanting to know if she should recheck thyroid and cholesterol levels.        Patient Active Problem List   Diagnosis     COLON POLYPS     HYPERLIPIDEMIA LDL GOAL <160     Lumbago     Hiatal hernia     Plantar fasciitis, bilateral     Hypothyroidism, unspecified type     Iron deficiency anemia, unspecified iron deficiency anemia type     Gastroesophageal reflux disease without esophagitis     Cervical high risk HPV (human papillomavirus) test positive     Class 1 obesity due to excess calories without serious comorbidity with body mass index (BMI) of 33.0 to 33.9 in adult     Mild dysplasia of cervix     Past Surgical History:   Procedure Laterality Date     BIOPSY OF BREAST, INCISIONAL  2004, 2001    left breast     C EXPLORATORY OF ABDOMEN  1980    Laparotomy, thought she had ovarian cyst, but nothing was found     COLONOSCOPY  10/8/2011    Procedure:COMBINED COLONOSCOPY, SINGLE BIOPSY/POLYPECTOMY BY BIOPSY; COLONOSCOPY; Surgeon:DARIANA RUIZ; Location: GI     COLONOSCOPY N/A 10/13/2015    Procedure: COLONOSCOPY;  Surgeon: Toi Sotelo MD;  Location:  GI     PHACOEMULSIFICATION CLEAR CORNEA WITH STANDARD INTRAOCULAR LENS IMPLANT  10/4/2012    Procedure: PHACOEMULSIFICATION CLEAR CORNEA WITH STANDARD INTRAOCULAR LENS IMPLANT;  RIGHT PHACOEMULSIFICATION CLEAR CORNEA WITH STANDARD INTRAOCULAR LENS IMPLANT;  Surgeon: Óscar Torrez MD;  Location:  EC     PHACOEMULSIFICATION CLEAR CORNEA WITH STANDARD INTRAOCULAR LENS IMPLANT  11/1/2012    Procedure: PHACOEMULSIFICATION CLEAR CORNEA WITH STANDARD INTRAOCULAR LENS IMPLANT;  LEFT  PHACOEMULSIFICATION CLEAR CORNEA WITH STANDARD  INTRAOCULAR LENS IMPLANT ;  Surgeon: Óscar Torrez MD;  Location: [facility]     SURGICAL HISTORY OF -       removal of skin cancer       Social History     Tobacco Use     Smoking status: Former Smoker     Years: 10.00     Last attempt to quit: 2003     Years since quittin.2     Smokeless tobacco: Never Used   Substance Use Topics     Alcohol use: Yes     Frequency: Monthly or less     Drinks per session: 1 or 2     Binge frequency: Never     Comment: rarely     Family History   Problem Relation Age of Onset     Diabetes Father         adult     Neurologic Disorder Father         parkinsons     Cancer Father 95        lung     Heart Disease Father      C.A.D. Father      Heart Disease Mother      Hypertension Mother      Breast Cancer Mother      Thyroid Disease Mother      Lung Cancer Mother      Cancer Mother      Thyroid Disease Sister      Thyroid Disease Sister      Heart Disease Paternal Uncle         2 uncles MI           Reviewed and updated as needed this visit by Provider  Meds         Review of Systems   Constitutional, HEENT, cardiovascular, pulmonary, gi and gu systems are negative, except as otherwise noted.       Objective   Reported vitals:  LMP 2008    healthy, alert and no distress  PSYCH: Alert and oriented times 3; coherent speech, normal   rate and volume, able to articulate logical thoughts, able   to abstract reason, no tangential thoughts, no hallucinations   or delusions  Her affect is normal  RESP: No cough, no audible wheezing, able to talk in full sentences  Remainder of exam unable to be completed due to telephone visits            Assessment/Plan:  1. Gastroesophageal reflux disease without esophagitis  Not improved with 1 month of PPI despite GI recommendation. Next step is egd, which referral was placed. She is also due for follow up colonoscopy - will schedule this as well.     2. Hypothyroidism, unspecified type  Due for follow up labs, last tsh slightly abnormal.   -  **TSH with free T4 reflex FUTURE anytime; Future    3. Hyperlipidemia LDL goal <160    - Lipid panel reflex to direct LDL Fasting; Future    4. Benign neoplasm of colon, unspecified part of colon    - GASTROENTEROLOGY ADULT REF PROCEDURE ONLY; Future    Return in about 1 week (around 6/30/2020) for Lab Only Appt.      Phone call duration:  17 minutes    LENARD Higuera CNP

## 2020-06-23 ENCOUNTER — VIRTUAL VISIT (OUTPATIENT)
Dept: PEDIATRICS | Facility: CLINIC | Age: 65
End: 2020-06-23
Payer: OTHER GOVERNMENT

## 2020-06-23 DIAGNOSIS — D12.6 BENIGN NEOPLASM OF COLON, UNSPECIFIED PART OF COLON: ICD-10-CM

## 2020-06-23 DIAGNOSIS — K21.9 GASTROESOPHAGEAL REFLUX DISEASE WITHOUT ESOPHAGITIS: Primary | ICD-10-CM

## 2020-06-23 DIAGNOSIS — E78.5 HYPERLIPIDEMIA LDL GOAL <160: ICD-10-CM

## 2020-06-23 DIAGNOSIS — E03.9 HYPOTHYROIDISM, UNSPECIFIED TYPE: ICD-10-CM

## 2020-06-23 PROCEDURE — 99214 OFFICE O/P EST MOD 30 MIN: CPT | Mod: 95 | Performed by: NURSE PRACTITIONER

## 2020-06-26 DIAGNOSIS — Z11.59 ENCOUNTER FOR SCREENING FOR OTHER VIRAL DISEASES: Primary | ICD-10-CM

## 2020-06-26 DIAGNOSIS — E03.9 HYPOTHYROIDISM, UNSPECIFIED TYPE: ICD-10-CM

## 2020-06-26 DIAGNOSIS — E78.5 HYPERLIPIDEMIA LDL GOAL <160: ICD-10-CM

## 2020-06-26 LAB
CHOLEST SERPL-MCNC: 225 MG/DL
HDLC SERPL-MCNC: 51 MG/DL
LDLC SERPL CALC-MCNC: 141 MG/DL
NONHDLC SERPL-MCNC: 174 MG/DL
T4 FREE SERPL-MCNC: 1.05 NG/DL (ref 0.76–1.46)
TRIGL SERPL-MCNC: 166 MG/DL
TSH SERPL DL<=0.005 MIU/L-ACNC: 5.14 MU/L (ref 0.4–4)

## 2020-06-26 PROCEDURE — 84443 ASSAY THYROID STIM HORMONE: CPT | Performed by: NURSE PRACTITIONER

## 2020-06-26 PROCEDURE — 36415 COLL VENOUS BLD VENIPUNCTURE: CPT | Performed by: NURSE PRACTITIONER

## 2020-06-26 PROCEDURE — 84439 ASSAY OF FREE THYROXINE: CPT | Performed by: NURSE PRACTITIONER

## 2020-06-26 PROCEDURE — 80061 LIPID PANEL: CPT | Performed by: NURSE PRACTITIONER

## 2020-06-30 RX ORDER — LEVOTHYROXINE SODIUM 88 UG/1
88 TABLET ORAL DAILY
Qty: 90 TABLET | Refills: 0 | Status: SHIPPED | OUTPATIENT
Start: 2020-06-30 | End: 2020-09-28

## 2020-07-07 ENCOUNTER — TRANSFERRED RECORDS (OUTPATIENT)
Dept: HEALTH INFORMATION MANAGEMENT | Facility: CLINIC | Age: 65
End: 2020-07-07

## 2020-07-10 DIAGNOSIS — Z11.59 ENCOUNTER FOR SCREENING FOR OTHER VIRAL DISEASES: ICD-10-CM

## 2020-07-10 PROCEDURE — 99207 ZZC NO BILLABLE SERVICE THIS VISIT: CPT

## 2020-07-10 PROCEDURE — U0003 INFECTIOUS AGENT DETECTION BY NUCLEIC ACID (DNA OR RNA); SEVERE ACUTE RESPIRATORY SYNDROME CORONAVIRUS 2 (SARS-COV-2) (CORONAVIRUS DISEASE [COVID-19]), AMPLIFIED PROBE TECHNIQUE, MAKING USE OF HIGH THROUGHPUT TECHNOLOGIES AS DESCRIBED BY CMS-2020-01-R: HCPCS | Performed by: INTERNAL MEDICINE

## 2020-07-11 LAB
SARS-COV-2 RNA SPEC QL NAA+PROBE: NOT DETECTED
SPECIMEN SOURCE: NORMAL

## 2020-07-13 ENCOUNTER — HOSPITAL ENCOUNTER (OUTPATIENT)
Facility: CLINIC | Age: 65
Discharge: HOME | End: 2020-07-13
Attending: INTERNAL MEDICINE | Admitting: INTERNAL MEDICINE
Payer: OTHER GOVERNMENT

## 2020-07-13 VITALS
HEART RATE: 74 BPM | DIASTOLIC BLOOD PRESSURE: 74 MMHG | BODY MASS INDEX: 35.28 KG/M2 | SYSTOLIC BLOOD PRESSURE: 127 MMHG | OXYGEN SATURATION: 95 % | RESPIRATION RATE: 16 BRPM | HEIGHT: 63 IN | TEMPERATURE: 97.9 F

## 2020-07-13 LAB
COLONOSCOPY: NORMAL
UPPER GI ENDOSCOPY: NORMAL

## 2020-07-13 PROCEDURE — 88305 TISSUE EXAM BY PATHOLOGIST: CPT | Performed by: INTERNAL MEDICINE

## 2020-07-13 PROCEDURE — G0500 MOD SEDAT ENDO SERVICE >5YRS: HCPCS | Performed by: INTERNAL MEDICINE

## 2020-07-13 PROCEDURE — 43239 EGD BIOPSY SINGLE/MULTIPLE: CPT | Performed by: INTERNAL MEDICINE

## 2020-07-13 PROCEDURE — 45378 DIAGNOSTIC COLONOSCOPY: CPT | Performed by: INTERNAL MEDICINE

## 2020-07-13 PROCEDURE — 88305 TISSUE EXAM BY PATHOLOGIST: CPT | Mod: 26 | Performed by: INTERNAL MEDICINE

## 2020-07-13 PROCEDURE — G0121 COLON CA SCRN NOT HI RSK IND: HCPCS | Performed by: INTERNAL MEDICINE

## 2020-07-13 PROCEDURE — 99153 MOD SED SAME PHYS/QHP EA: CPT | Performed by: INTERNAL MEDICINE

## 2020-07-13 PROCEDURE — 25000125 ZZHC RX 250: Performed by: INTERNAL MEDICINE

## 2020-07-13 PROCEDURE — 40000104 ZZH STATISTIC MODERATE SEDATION < 10 MIN: Performed by: INTERNAL MEDICINE

## 2020-07-13 PROCEDURE — 25000128 H RX IP 250 OP 636: Performed by: INTERNAL MEDICINE

## 2020-07-13 RX ORDER — NALOXONE HYDROCHLORIDE 0.4 MG/ML
.1-.4 INJECTION, SOLUTION INTRAMUSCULAR; INTRAVENOUS; SUBCUTANEOUS
Status: DISCONTINUED | OUTPATIENT
Start: 2020-07-13 | End: 2020-07-13 | Stop reason: HOSPADM

## 2020-07-13 RX ORDER — LIDOCAINE 40 MG/G
CREAM TOPICAL
Status: DISCONTINUED | OUTPATIENT
Start: 2020-07-13 | End: 2020-07-13 | Stop reason: HOSPADM

## 2020-07-13 RX ORDER — FENTANYL CITRATE 50 UG/ML
INJECTION, SOLUTION INTRAMUSCULAR; INTRAVENOUS PRN
Status: DISCONTINUED | OUTPATIENT
Start: 2020-07-13 | End: 2020-07-13 | Stop reason: HOSPADM

## 2020-07-13 RX ORDER — FLUMAZENIL 0.1 MG/ML
0.2 INJECTION, SOLUTION INTRAVENOUS
Status: DISCONTINUED | OUTPATIENT
Start: 2020-07-13 | End: 2020-07-13 | Stop reason: HOSPADM

## 2020-07-13 RX ORDER — ONDANSETRON 2 MG/ML
4 INJECTION INTRAMUSCULAR; INTRAVENOUS EVERY 6 HOURS PRN
Status: DISCONTINUED | OUTPATIENT
Start: 2020-07-13 | End: 2020-07-13 | Stop reason: HOSPADM

## 2020-07-13 RX ORDER — ONDANSETRON 2 MG/ML
4 INJECTION INTRAMUSCULAR; INTRAVENOUS
Status: DISCONTINUED | OUTPATIENT
Start: 2020-07-13 | End: 2020-07-13 | Stop reason: HOSPADM

## 2020-07-13 RX ORDER — ONDANSETRON 4 MG/1
4 TABLET, ORALLY DISINTEGRATING ORAL EVERY 6 HOURS PRN
Status: DISCONTINUED | OUTPATIENT
Start: 2020-07-13 | End: 2020-07-13 | Stop reason: HOSPADM

## 2020-07-13 NOTE — LETTER
July 1, 2020      Vanessa Mckeon  [address]   [place] [address]        Dear Vanessa,   Thank you for choosing [facility]. You are scheduled for the following service(s).   Please be aware that coverage of these services is subject to the terms and limitations of your health insurance plan.  Call member services at your health plan with any benefit or coverage questions.    Date: 7/13/20       Procedure: UPPER ENDOSCOPY & COLONOSCOPY  Doctor: Dr Sotelo       Arrival Time:  1:30 PM  *check in at [facility].  Procedure Time:  2 PM  Location:   [facility]        Endoscopy Department, First Floor          [address] [place], [place] [address]      [phone] or [phone] (Formerly [facility]) to reschedule   MIRALAX -GATORADE  PREP    Upper Endoscopy or Esophagogastroduodenoscopy (EGD) is a test performed to evaluate symptoms of persistent abdominal pain, nausea, vomiting or difficulty swallowing. It may also be used to treat various conditions of the upper gastrointestinal (GI) tract, such as bleeding, narrowing or abnormal growths. During the procedure, a doctor examines the lining of your esophagus, stomach and the first part of your small intestine through a thin, flexible tube called an endoscope. If growths or other abnormalities are found during the procedure, the doctor may remove the abnormal tissue (biopsy) for further examination.     Colonoscopy is the most accurate test to detect colon polyps and colon cancer; and the only test where polyps can be removed. During this procedure, a doctor examines the lining of your large intestine and rectum through a flexible tube.     Transportation  You must arrange for a ride for the day of your procedure with a responsible adult. A taxi , Uber, etc, is not an option unless you are accompanied by a responsible adult. If you fail to arrange transportation with a responsible adult,  your procedure will be cancelled and rescheduled.    Purchase the  following supplies at your local pharmacy:  - 2 (two) bisacodyl tablets: each tablet contains 5 mg.  (Dulcolax  laxative NOT Dulcolax  stool softener)   - 1 (one) 8.3 oz bottle of Polyethylene Glycol (PEG) 3350 Powder   (MiraLAX , Smooth LAX , ClearLAX  or equivalent)  - 64 oz Gatorade    Regular Gatorade, Gatorade G2 , Powerade , Powerade Zero  or Pedialyte  is acceptable. Red colored flavors are not allowed; all other colors (yellow, green, orange, purple and blue) are okay. It is also okay to buy two 2.12 oz packets of powdered Gatorade that can be mixed with water to a total volume of 64 oz of liquid.  - 1 (one) 10 oz bottle of Magnesium Citrate (Red colored flavors are not allowed)  It is also okay for you to use a 0.5 oz package of powdered magnesium citrate (17 g) mixed with 10 oz of water.      PREPARATION FOR COLONOSCOPY  7 days before:    Discontinue fiber supplements and medications containing iron. This includes Metamucil  and Fibercon ; and multivitamins with iron.  3 days before:    Begin a low-fiber diet. A low-fiber diet helps making the cleanout more effective.     Examples of a low-fiber diet include (but are not limited to): white bread, white rice, pasta, crackers, fish, chicken, eggs, ground beef, creamy peanut butter, cooked/steamed/boiled vegetables, canned fruit, bananas, melons, milk, plain yogurt cheese, salad dressing and other condiments.     The following are not allowed on a low-fiber diet: seeds, nuts, popcorn, bran, whole wheat, corn, quinoa, raw fruits and vegetables, berries and dried fruit, beans and lentils.    For additional details on low-fiber diet, please refer to the table on the last page.  2 days before:    Continue the low-fiber diet.     Drink at least 8 glasses of water throughout the day.     Stop eating solid foods at 11:45 pm.  1. 1 day before:    In the morning: begin a clear liquid diet (liquids you  can see through).     Examples of a clear liquid diet include: water, clear broth or bouillon, coffee or tea without milk or cream Gatorade, Pedialyte or Powerade, carbonated and non-carbonated soft drinks (Sprite , 7-Up , ginger ale), strained fruit juices without pulp (apple, white grape, white cranberry), Jell-O  and popsicles.     The following are not allowed on a clear liquid diet: red liquids, alcoholic beverages,  dairy products (milk, creamer, and yogurt), protein shakes, creamy broths, juice with pulp and chewing tobacco.    At noon: take 2 (two) bisacodyl tablets     At 4 (and no later than 6pm): start drinking the Miralax-Gatorade preparation (8.3 oz of Miralax mixed with 64 oz of Gatorade in a large pitcher). Drink 1(one) 8 oz glass every 15 minutes thereafter, until the mixture is gone.      COLON CLEANSING TIPS: drink adequate amounts of fluids before and after your colon cleansing to prevent dehydration. Stay near a toilet because you will have diarrhea. Even if you are sitting on the toilet, continue to drink the cleansing solution every 15 minutes. If you feel nauseous or vomit, rinse your mouth with water, take a 15 to 30-minute-break and then continue drinking the solution. You will be uncomfortable until the stool has flushed from your colon (in about 2 to 4 hours). You may feel chilled.    Day of your procedure  You may take all of your morning medications including blood pressure medications, blood thinners (if you have not been instructed to stop these by our office), methadone, anti-seizure medications with sips of water 3 hours prior to your procedure or earlier. Do not take insulin or vitamins prior to your procedure. Continue the clear liquid diet.   4 hours prior: drink 10 oz of magnesium citrate. It may be easier to drink it with a straw.    STOP consuming all liquids after that.     Do not take anything by mouth during this time.     Allow extra time to travel to your procedure as you  may need to stop and use a restroom along the way.  You are ready for the procedure, if you followed all instructions and your stool is no longer formed, but clear or yellow liquid. If you are unsure whether your colon is clean, please call our office at [phone] before you leave for your appointment.  Bring the following to your procedure:  - Insurance Card/Photo ID.   - List of current medications including over-the-counter medications and supplements.   - Your rescue inhaler if you currently use one to control asthma.    Canceling or rescheduling your appointment:   If you must cancel or reschedule your appointment, please call [phone] as soon as possible.    COLONOSCOPY PRE-PROCEDURE CHECKLIST  If you have diabetes, ask your regular doctor for diet and medication restrictions.  If you take an anticoagulant or anti-platelet medication (such as Coumadin , Lovenox , Pradaxa , Xarelto , Eliquis , etc.), please call your primary doctor for advice on holding this medication.  If you take aspirin you may continue to do so.  If you are or may be pregnant, please discuss the risks and benefits of this procedure with your doctor.    What happens during a colonoscopy?  Plan to spend up to two hours, starting at registration time, at the endoscopy center the day of your procedure. The colonoscopy takes an average of 15 to 30 minutes. Recovery time is about 30 minutes.     Before the exam:    You will change into a gown.    Your medical history and medication list will be reviewed with you, unless that has been done over the phone prior to the procedure.     A nurse will insert an intravenous (IV) line into your hand or arm.    The doctor will meet with you and will give you a consent form to sign.  1.   2. During the exam:     Medicine will be given through the IV line to help you relax.     Your heart rate and oxygen levels will be monitored. If your blood pressure is low, you may be given fluids through the IV  line.     The doctor will insert a flexible hollow tube, called a colonoscope, into your rectum. The scope will be advanced slowly through the large intestine (colon).    You may have a feeling of fullness or pressure.     If an abnormal tissue or a polyp is found, the doctor may remove it through the endoscope for closer examination, or biopsy. Tissue removal is painless    After the exam:           Any tissue samples removed during the exam will be sent to a lab for evaluation. It may take 5-7 working days for you to be notified of the results.     A nurse will provide you with complete discharge instructions before you leave the endoscopy center. Be sure to ask the nurse for specific instructions if you take blood thinners such as Aspirin, Coumadin or Plavix.     The doctor will prepare a full report for you and for the physician who referred you for the procedure.     Your doctor will talk with you about the initial results of your exam.      Medication given during the exam will prohibit you from driving for the rest of the day.     Following the exam, you may resume your normal diet. Your first meal should be light, no greasy foods. Avoid alcohol until the next day.     You may resume your regular activities the day after the procedure.       LOW-FIBER DIET  Foods RECOMMENDED Foods to AVOID   Breads, Cereal, Rice and Pasta:   White bread, rolls, biscuits, croissant and santiago toast.   Waffles, Angolan toast and pancakes.   White rice, noodles, pasta, macaroni and peeled cooked potatoes.   Plain crackers and saltines.   Cooked cereals: farina, cream of rice.   Cold cereals: Puffed Rice , Rice Krispies , Corn Flakes  and Special K    Breads, Cereal, Rice and Pasta:   Breads or rolls with nuts, seeds or fruit.   Whole wheat, pumpernickel, rye breads and cornbread.   Potatoes with skin, brown or wild rice, and kasha (buckwheat).     Vegetables:   Tender cooked and canned vegetables without seeds: carrots, asparagus  tips, green or wax beans, pumpkin, spinach, lima beans. Vegetables:   Raw or steamed vegetables (w/ or without seeds)   Sauerkraut.   Winter squash, peas, broccoli, Brussel sprouts, cabbage, onions, cauliflower, baked beans, peas and corn.   Fruits:   Strained fruit juice.   Canned fruit, except pineapple.   Ripe bananas and melon. Fruits:   Prunes and prune juice.   Raw fruits.   Dried fruits: figs, dates and raisins.   Milk/Dairy:   Milk: plain or flavored; yogurt; ice cream.   Custard; cheese and cottage cheese Milk/Dairy:     Meat and other proteins:   Ground, well-cooked tender beef, lamb, ham, veal, pork, fish, poultry, organ meats and eggs.   Peanut butter without nuts. Meat and other proteins:   Tough, fibrous meats with gristle.   Dry beans and peas; lentils and Tofu   Peanut butter with nuts.   Fats, Snack, Sweets, Condiments and Beverages:   Margarine, butter, oils, mayonnaise, sour cream and salad dressing, plain gravy.   Sugar, hard candy, clear jelly, honey and syrup.   Spices, cooked herbs, bouillon, broth and soups made with allowed vegetable, ketchup and mustard.   Coffee, tea and carbonated drinks.   Plain cakes, cookies and pretzels.   Gelatin, plain puddings, custard, ice cream, sherbet and popsicles. Fats, Snack, Sweets, Condiments and Beverages:   Nuts, seeds and coconut.   Jam, marmalade and preserves.   Pickles, olives, relish and horseradish.   All desserts containing nuts, seeds, dried fruit and coconut; or made from whole grains or bran.   Candy made with nuts or seeds.   Popcorn.             DIRECTIONS TO THE ENDOSCOPY DEPARTMENT  From the north ([facility])  Take [address] south, exit on [address] . Get into the left hand ama, turn left (east), go one-half mile to [address] and turn left. Go north to the first stoplight, take a right on [address] and follow it to the Main entrance.    From the south ([facility])  Take 35N to the 35E split and  exit on [address] . On [address] , turn left (west) to [address]. Turn right (north) on [address]. Go north to the first stoplight, take a right on [address] and follow it to the Main entrance.    From the east via 35E ([facility])  Take [address]E south to [address]  exit. Turn right on [address]. Go west to [address]. Turn right (north) on [address]. Go to the first stoplight, take a right and follow on [address] to the Main entrance.    From the east via [address] ([facility])  Take [address] West to [address]. Turn left (south) on [address] to [address]. Turn left (east) on [address] and follow it to the Main entrance.    From the west via [address] 13 ([facility])  Take [address] east to [address]. Turn right (south) on [address] to [address]. Turn left (east) on [address] and follow it to the Main entrance.

## 2020-07-13 NOTE — H&P
Pre-Endoscopy History and Physical     Vanessa Mckeon MRN# [id number]   YOB: 1955 Age: 64 year old     Date of Procedure: 7/13/2020  Primary care provider: Tatiana Leahy  Type of Endoscopy: Colonoscopy with possible biopsy, possible polypectomy and Gastroscopy with possible biopsy, possible dilation  Reason for Procedure: history of polyps  Type of Anesthesia Anticipated: Conscious Sedation    HPI:    Vanessa is a 64 year old female who will be undergoing the above procedure.      A history and physical has been performed. The patient's medications and allergies have been reviewed. The risks and benefits of the procedure and the sedation options and risks were discussed with the patient.  All questions were answered and informed consent was obtained.      She denies a personal or family history of anesthesia complications or bleeding disorders.     Patient Active Problem List   Diagnosis     COLON POLYPS     HYPERLIPIDEMIA LDL GOAL <160     Lumbago     Hiatal hernia     Plantar fasciitis, bilateral     Hypothyroidism, unspecified type     Iron deficiency anemia, unspecified iron deficiency anemia type     Gastroesophageal reflux disease without esophagitis     Cervical high risk HPV (human papillomavirus) test positive     Class 1 obesity due to excess calories without serious comorbidity with body mass index (BMI) of 33.0 to 33.9 in adult     Mild dysplasia of cervix        Past Medical History:   Diagnosis Date     Anemia      Benign neoplasm of colon      Cervical high risk HPV (human papillomavirus) test positive 10/02/2017 & 10/9/2018 & 10/24/2019    10/2/17 NIL, +HR HPV, not 16/18     Gastro-oesophageal reflux disease      Hypertrophy of breast     fibrous left breast- benign     Malignant neoplasm (H)      Mild dysplasia of cervix 12/3/2019     Pure hypercholesterolemia      Skin cancer         Past Surgical History:   Procedure Laterality Date     BIOPSY OF BREAST, INCISIONAL  2004,      left breast     C EXPLORATORY OF ABDOMEN      Laparotomy, thought she had ovarian cyst, but nothing was found     COLONOSCOPY  10/8/2011    Procedure:COMBINED COLONOSCOPY, SINGLE BIOPSY/POLYPECTOMY BY BIOPSY; COLONOSCOPY; Surgeon:DARIANA RUIZ; Location: GI     COLONOSCOPY N/A 10/13/2015    Procedure: COLONOSCOPY;  Surgeon: oTi Sotelo MD;  Location:  GI     PHACOEMULSIFICATION CLEAR CORNEA WITH STANDARD INTRAOCULAR LENS IMPLANT  10/4/2012    Procedure: PHACOEMULSIFICATION CLEAR CORNEA WITH STANDARD INTRAOCULAR LENS IMPLANT;  RIGHT PHACOEMULSIFICATION CLEAR CORNEA WITH STANDARD INTRAOCULAR LENS IMPLANT;  Surgeon: Óscar Torrez MD;  Location:  EC     PHACOEMULSIFICATION CLEAR CORNEA WITH STANDARD INTRAOCULAR LENS IMPLANT  2012    Procedure: PHACOEMULSIFICATION CLEAR CORNEA WITH STANDARD INTRAOCULAR LENS IMPLANT;  LEFT  PHACOEMULSIFICATION CLEAR CORNEA WITH STANDARD INTRAOCULAR LENS IMPLANT ;  Surgeon: Óscar Torrez MD;  Location:  [facility]     SURGICAL HISTORY OF -       removal of skin cancer       Social History     Tobacco Use     Smoking status: Former Smoker     Years: 10.00     Last attempt to quit: 2003     Years since quittin.2     Smokeless tobacco: Never Used   Substance Use Topics     Alcohol use: Yes     Frequency: Monthly or less     Drinks per session: 1 or 2     Binge frequency: Never     Comment: very little       Family History   Problem Relation Age of Onset     Diabetes Father         adult     Neurologic Disorder Father         parkinsons     Cancer Father 95        lung     Heart Disease Father      C.A.D. Father      Heart Disease Mother      Hypertension Mother      Breast Cancer Mother      Thyroid Disease Mother      Lung Cancer Mother      Cancer Mother      Thyroid Disease Sister      Thyroid Disease Sister      Heart Disease Paternal Uncle         2 uncles MI     Colon Cancer No family hx of        Prior to Admission medications    Medication  "Sig Start Date End Date Taking? Authorizing Provider   fluticasone (FLONASE) 50 MCG/ACT nasal spray Spray 2 sprays into both nostrils daily 9/10/14  Yes Dinora Rivas, NP   Iron Combinations (IRON COMPLEX PO)    Yes Reported, Patient   levothyroxine (SYNTHROID/LEVOTHROID) 88 MCG tablet Take 1 tablet (88 mcg) by mouth daily 6/30/20  Yes Yoana Coronado APRN CNP   Multiple Vitamins-Minerals (MULTIVITAMIN OR) Take  by mouth.   Yes Reported, Patient       Allergies   Allergen Reactions     Flu Virus Vaccine      Hypertension,flushing        REVIEW OF SYSTEMS:   5 point ROS negative except as noted above in HPI, including Gen., Resp., CV, GI &  system review.    PHYSICAL EXAM:   /71   Pulse 80   Temp 97.9  F (36.6  C) (Temporal)   Resp 15   Ht 1.588 m (5' 2.5\")   LMP 05/19/2008   SpO2 96%   BMI 35.28 kg/m   Estimated body mass index is 35.28 kg/m  as calculated from the following:    Height as of this encounter: 1.588 m (5' 2.5\").    Weight as of 12/3/19: 88.9 kg (196 lb).   GENERAL APPEARANCE: alert, and oriented  MENTAL STATUS: alert  AIRWAY EXAM: Mallampatti Class I (visualization of the soft palate, fauces, uvula, anterior and posterior pillars)  RESP: lungs clear to auscultation - no rales, rhonchi or wheezes  CV: regular rates and rhythm  DIAGNOSTICS:    Not indicated    IMPRESSION   ASA Class 2 - Mild systemic disease    PLAN:   Plan for Colonoscopy with possible biopsy, possible polypectomy and Gastroscopy with possible biopsy, possible dilation. We discussed the risks, benefits and alternatives and the patient wished to proceed.    The above has been forwarded to the consulting provider.      Signed Electronically by: Toi Sotelo MD  July 13, 2020          "

## 2020-07-14 LAB — COPATH REPORT: NORMAL

## 2020-10-19 ENCOUNTER — DOCUMENTATION ONLY (OUTPATIENT)
Dept: PEDIATRICS | Facility: CLINIC | Age: 65
End: 2020-10-19

## 2020-10-19 DIAGNOSIS — Z13.220 LIPID SCREENING: Primary | ICD-10-CM

## 2020-10-19 DIAGNOSIS — E03.9 HYPOTHYROIDISM, UNSPECIFIED TYPE: ICD-10-CM

## 2020-10-19 DIAGNOSIS — Z13.220 LIPID SCREENING: ICD-10-CM

## 2020-10-19 LAB
CHOLEST SERPL-MCNC: 263 MG/DL
GLUCOSE SERPL-MCNC: 94 MG/DL (ref 70–99)
HDLC SERPL-MCNC: 49 MG/DL
LDLC SERPL CALC-MCNC: 174 MG/DL
NONHDLC SERPL-MCNC: 214 MG/DL
TRIGL SERPL-MCNC: 198 MG/DL
TSH SERPL DL<=0.005 MIU/L-ACNC: 2.04 MU/L (ref 0.4–4)

## 2020-10-19 PROCEDURE — 84443 ASSAY THYROID STIM HORMONE: CPT | Performed by: NURSE PRACTITIONER

## 2020-10-19 PROCEDURE — 36415 COLL VENOUS BLD VENIPUNCTURE: CPT | Performed by: NURSE PRACTITIONER

## 2020-10-19 PROCEDURE — 80061 LIPID PANEL: CPT | Performed by: NURSE PRACTITIONER

## 2020-10-19 PROCEDURE — 82947 ASSAY GLUCOSE BLOOD QUANT: CPT | Performed by: NURSE PRACTITIONER

## 2020-10-19 NOTE — PROGRESS NOTES
Vanessa came here today to have some blood drawn and she stated that she also needed a Glucose and a Lipid Panel. If needed please future and I will process right away. Thanks!

## 2020-10-19 NOTE — LETTER
[place] [id number] [facility] [address]                  [phone]   October 20, 2020    Vanessa Mckeon  [address]   [place] [address]      Hi there,     Your blood sugar and thyroid look good.     The results of your recent lipid (cholesterol) profile were abnormal, but not to the point of requiring meds (quite yet).     Desired or goal lipid levels are:   CHOLESTEROL: Desirable is less than 200.   HDL (Good Cholesterol): Desirable is greater than 40 in men and greater than 50 in women.   LDL (Bad Cholesterol): Desirable is less than 130 or less than 100 in patients with diabetes or vascular disease. For some patients with diabetes or vascular disease, the desireable LDL is less than 70.   TRIGLYCERIDES: Desirable is less than 150.       As you may know, an elevated cholesterol is one factor that increases your risk for heart disease and stroke.  You can improve your cholesterol by controlling the amount and type of fat you eat, and by increasing your daily activity level.     Here are some ways to improve your nutrition (adapted from AAFP handout):          *   Eat good fats such as olive oil, salmon, grass fed butter, grass fed dairy, nuts,                 avocados, etc.        *   Buy lean cuts of meat, reduce your portions of red meat, or             substitute poultry or fish        *   Avoid fried or fast foods that are high in fat        *   Eat more fruits and vegetables!!     Also consider starting or increasing your aerobic activity.  Aerobic activity is the best way to improve HDL (good) cholesterol.  If this would be new to you, please talk with me first about what activities are safe for you.       Please feel free to call me with any questions, or if you would like more information.       All the best,     Yoana Coronado, KINGSLEY-DNP.         Results for orders placed or performed in visit on 10/19/20   **TSH with free T4 reflex  FUTURE 1yr     Status: None   Result Value Ref Range    TSH 2.04 0.40 - 4.00 mU/L   Lipid panel reflex to direct LDL     Status: Abnormal   Result Value Ref Range    Cholesterol 263 (H) <200 mg/dL    Triglycerides 198 (H) <150 mg/dL    HDL Cholesterol 49 (L) >49 mg/dL    LDL Cholesterol Calculated 174 (H) <100 mg/dL    Non HDL Cholesterol 214 (H) <130 mg/dL   Glucose     Status: None   Result Value Ref Range    Glucose 94 70 - 99 mg/dL

## 2020-10-26 ENCOUNTER — OFFICE VISIT (OUTPATIENT)
Dept: PEDIATRICS | Facility: CLINIC | Age: 65
End: 2020-10-26
Payer: MEDICARE

## 2020-10-26 VITALS
SYSTOLIC BLOOD PRESSURE: 135 MMHG | RESPIRATION RATE: 16 BRPM | DIASTOLIC BLOOD PRESSURE: 86 MMHG | BODY MASS INDEX: 33.72 KG/M2 | OXYGEN SATURATION: 96 % | WEIGHT: 190.3 LBS | HEIGHT: 63 IN | TEMPERATURE: 98 F | HEART RATE: 89 BPM

## 2020-10-26 DIAGNOSIS — E78.5 HYPERLIPIDEMIA LDL GOAL <160: ICD-10-CM

## 2020-10-26 DIAGNOSIS — Z23 NEED FOR PNEUMOCOCCAL VACCINATION: ICD-10-CM

## 2020-10-26 DIAGNOSIS — Z12.4 ENCOUNTER FOR SCREENING FOR MALIGNANT NEOPLASM OF CERVIX: ICD-10-CM

## 2020-10-26 DIAGNOSIS — Z23 NEED FOR INFLUENZA VACCINATION: ICD-10-CM

## 2020-10-26 DIAGNOSIS — Z00.00 WELCOME TO MEDICARE PREVENTIVE VISIT: Primary | ICD-10-CM

## 2020-10-26 DIAGNOSIS — Z23 FLU VACCINE NEED: ICD-10-CM

## 2020-10-26 DIAGNOSIS — N87.0 MILD DYSPLASIA OF CERVIX: ICD-10-CM

## 2020-10-26 DIAGNOSIS — E03.9 HYPOTHYROIDISM, UNSPECIFIED TYPE: ICD-10-CM

## 2020-10-26 PROCEDURE — G0124 SCREEN C/V THIN LAYER BY MD: HCPCS | Performed by: PATHOLOGY

## 2020-10-26 PROCEDURE — G0008 ADMIN INFLUENZA VIRUS VAC: HCPCS | Performed by: NURSE PRACTITIONER

## 2020-10-26 PROCEDURE — G0009 ADMIN PNEUMOCOCCAL VACCINE: HCPCS | Performed by: NURSE PRACTITIONER

## 2020-10-26 PROCEDURE — G0145 SCR C/V CYTO,THINLAYER,RESCR: HCPCS | Performed by: NURSE PRACTITIONER

## 2020-10-26 PROCEDURE — 90686 IIV4 VACC NO PRSV 0.5 ML IM: CPT | Performed by: NURSE PRACTITIONER

## 2020-10-26 PROCEDURE — 90670 PCV13 VACCINE IM: CPT | Performed by: NURSE PRACTITIONER

## 2020-10-26 PROCEDURE — 87624 HPV HI-RISK TYP POOLED RSLT: CPT | Performed by: NURSE PRACTITIONER

## 2020-10-26 PROCEDURE — G0402 INITIAL PREVENTIVE EXAM: HCPCS | Performed by: NURSE PRACTITIONER

## 2020-10-26 RX ORDER — LEVOTHYROXINE SODIUM 88 UG/1
88 TABLET ORAL DAILY
Qty: 90 TABLET | Status: SHIPPED | OUTPATIENT
Start: 2020-10-26 | End: 2021-11-22

## 2020-10-26 ASSESSMENT — ENCOUNTER SYMPTOMS
SORE THROAT: 0
HEMATOCHEZIA: 0
DYSURIA: 0
HEMATURIA: 0
PALPITATIONS: 0
WEAKNESS: 0
COUGH: 0
SHORTNESS OF BREATH: 0
DIARRHEA: 0
CHILLS: 0
EYE PAIN: 0
FREQUENCY: 0
HEARTBURN: 0
ABDOMINAL PAIN: 0
NERVOUS/ANXIOUS: 0
BREAST MASS: 0
FEVER: 0
JOINT SWELLING: 0
ARTHRALGIAS: 0
HEADACHES: 0
NAUSEA: 0
PARESTHESIAS: 0
MYALGIAS: 0
CONSTIPATION: 0
DIZZINESS: 0

## 2020-10-26 ASSESSMENT — ACTIVITIES OF DAILY LIVING (ADL): CURRENT_FUNCTION: NO ASSISTANCE NEEDED

## 2020-10-26 ASSESSMENT — MIFFLIN-ST. JEOR: SCORE: 1369.39

## 2020-10-26 NOTE — PROGRESS NOTES
"SUBJECTIVE:   Vanessa Mckeon is a 65 year old female who presents for Preventive Visit.    Split Bill scripting  The purpose of this visit is to discuss your medical history and prevent health problems before you are sick. You may be responsible for a co-pay, coinsurance, or deductible if your visit today includes services such as checking on a sore throat, having an x-ray or lab test, or treating and evaluating a new or existing condition :[id number]}  Patient has been advised of split billing requirements and indicates understanding: Yes   Are you in the first 12 months of your Medicare coverage?  Yes,  Visual     Acuity:  Right Eye: 20/32   Left Eye: 20/25  Both Eyes: 20/25    Healthy Habits:     In general, how would you rate your overall health?  Good    Frequency of exercise:  4-5 days/week    Duration of exercise:  45-60 minutes    Do you usually eat at least 4 servings of fruit and vegetables a day, include whole grains    & fiber and avoid regularly eating high fat or \"junk\" foods?  Yes    Taking medications regularly:  Yes    Medication side effects:  None    Ability to successfully perform activities of daily living:  No assistance needed    Home Safety:  No safety concerns identified    Hearing Impairment:  No hearing concerns    In the past 6 months, have you been bothered by leaking of urine?  No    In general, how would you rate your overall mental or emotional health?  Good      PHQ-2 Total Score: 0    Additional concerns today:  No    Do you feel safe in your environment? Yes    Have you ever done Advance Care Planning? (For example, a Health Directive, POLST, or a discussion with a medical provider or your loved ones about your wishes): Yes, advance care planning is on file.      Fall risk  Fallen 2 or more times in the past year?: No  Any fall with injury in the past year?: No  Cognitive Screening   1) Repeat 3 items (Leader, Season, Table)    2) Clock draw: NORMAL  3) 3 item recall: Recalls 3 " objects  Results: 3 items recalled: COGNITIVE IMPAIRMENT LESS LIKELY    Mini-CogTM Copyright KASSI Miller. Licensed by the author for use in [facility]; reprinted with permission (chandler@.[facility]). All rights reserved.      Do you have sleep apnea, excessive snoring or daytime drowsiness?: no    History of hypothyroidism  TSH   Date Value Ref Range Status   10/19/2020 2.04 0.40 - 4.00 mU/L Final     Wt Readings from Last 4 Encounters:   10/26/20 86.3 kg (190 lb 4.8 oz)   19 88.9 kg (196 lb)   10/24/19 89.5 kg (197 lb 6.4 oz)   19 90.1 kg (198 lb 11.2 oz)     Elevated lipids, has been working on diet and exercise, has a pretty restrictive.     The 10-year ASCVD risk score (Alethea PANIAGUA Jr., et al., 2013) is: 7.5%    Values used to calculate the score:      Age: 65 years      Sex: Female      Is Non- : No      Diabetic: No      Tobacco smoker: No      Systolic Blood Pressure: 135 mmHg      Is BP treated: No      HDL Cholesterol: 49 mg/dL      Total Cholesterol: 263 mg/dL        Reviewed and updated as needed this visit by clinical staff   Allergies  Meds              Reviewed and updated as needed this visit by Provider    Meds             Social History     Tobacco Use     Smoking status: Former Smoker     Years: 10.     Quit date: 2003     Years since quittin.5     Smokeless tobacco: Never Used   Substance Use Topics     Alcohol use: Yes     Frequency: Monthly or less     Drinks per session: 1 or 2     Binge frequency: Never     Comment: very little         Alcohol Use 10/26/2020   Prescreen: >3 drinks/day or >7 drinks/week? No   Prescreen: >3 drinks/day or >7 drinks/week? -         Current providers sharing in care for this patient include:   Patient Care Team:  Tatiana Leahy APRN CNP as PCP - General (Family Practice)  Tatiana Leahy APRN CNP as Assigned PCP    The following health maintenance items are reviewed in Epic and correct as of  today:  Health Maintenance   Topic Date Due     ANNUAL REVIEW OF HM ORDERS  1955     FALL RISK ASSESSMENT  11/21/2020     PAP FOLLOW-UP  12/03/2020     HPV FOLLOW-UP  12/03/2020     TSH W/FREE T4 REFLEX  10/19/2021     MEDICARE ANNUAL WELLNESS VISIT  10/26/2021     Pneumococcal Vaccine: 65+ Years (2 of 2 - PPSV23) 10/26/2021     MAMMO SCREENING  11/21/2021     DTAP/TDAP/TD IMMUNIZATION (3 - Td) 07/01/2023     COLORECTAL CANCER SCREENING  07/13/2025     LIPID  10/19/2025     ADVANCE CARE PLANNING  10/26/2025     DEXA  Completed     HEPATITIS C SCREENING  Completed     PHQ-2  Completed     INFLUENZA VACCINE  Completed     ZOSTER IMMUNIZATION  Completed     HIV SCREENING  Addressed     Pneumococcal Vaccine: Pediatrics (0 to 5 Years) and At-Risk Patients (6 to 64 Years)  Aged Out     IPV IMMUNIZATION  Aged Out     MENINGITIS IMMUNIZATION  Aged Out     HEPATITIS B IMMUNIZATION  Aged Out     Lab work is in process  Pneumonia Vaccine:Adults age 65+ who have not received previous Pneumovax (PPSV23) or PCV13 as an adult: Should first be given PCV13 AND then should be given PPSV23 6-12 months after PCV13  Mammogram Screening: Mammogram Screening: Patient over age 50, mutual decision to screen reflected in health maintenance.  History of abnormal Pap smear: YES - updated in Problem List and Health Maintenance accordingly  Last 3 Pap and HPV Results:   PAP / HPV Latest Ref Rng & Units 10/24/2019 10/9/2018 10/2/2017   PAP - NIL NIL NIL   HPV 16 DNA NEG:Negative Negative Negative Negative   HPV 18 DNA NEG:Negative Negative Negative Negative   OTHER HR HPV NEG:Negative Positive(A) Positive(A) Positive(A)       Review of Systems   Constitutional: Negative for chills and fever.   HENT: Negative for congestion, ear pain, hearing loss and sore throat.    Eyes: Negative for pain and visual disturbance.   Respiratory: Negative for cough and shortness of breath.    Cardiovascular: Negative for chest pain, palpitations and  "peripheral edema.   Gastrointestinal: Negative for abdominal pain, constipation, diarrhea, heartburn, hematochezia and nausea.   Breasts:  Negative for tenderness, breast mass and discharge.   Genitourinary: Negative for dysuria, frequency, genital sores, hematuria, pelvic pain, urgency, vaginal bleeding and vaginal discharge.   Musculoskeletal: Negative for arthralgias, joint swelling and myalgias.   Skin: Negative for rash.   Neurological: Negative for dizziness, weakness, headaches and paresthesias.   Psychiatric/Behavioral: Negative for mood changes. The patient is not nervous/anxious.      Constitutional, HEENT, cardiovascular, pulmonary, GI, , musculoskeletal, neuro, skin, endocrine and psych systems are negative, except as otherwise noted.    OBJECTIVE:   /86   Pulse 89   Temp 98  F (36.7  C) (Oral)   Resp 16   Ht 1.588 m (5' 2.5\")   Wt 86.3 kg (190 lb 4.8 oz)   LMP 05/19/2008   SpO2 96%   BMI 34.25 kg/m   Estimated body mass index is 34.25 kg/m  as calculated from the following:    Height as of this encounter: 1.588 m (5' 2.5\").    Weight as of this encounter: 86.3 kg (190 lb 4.8 oz).  Physical Exam  GENERAL: healthy, alert and no distress  EYES: Eyes grossly normal to inspection, PERRL and conjunctivae and sclerae normal  HENT: ear canals and TM's normal, nose and mouth without ulcers or lesions  NECK: no adenopathy, no asymmetry, masses, or scars and thyroid normal to palpation  RESP: lungs clear to auscultation - no rales, rhonchi or wheezes  CV: regular rate and rhythm, normal S1 S2, no S3 or S4, no murmur, click or rub, no peripheral edema and peripheral pulses strong  ABDOMEN: soft, nontender, no hepatosplenomegaly, no masses and bowel sounds normal   (female): normal female external genitalia, normal urethral meatus, vaginal mucosa, normal cervix/adnexa/uterus without masses or discharge  MS: no gross musculoskeletal defects noted, no edema  SKIN: no suspicious lesions or " "rashes  PSYCH: mentation appears normal, affect normal/bright      ASSESSMENT / PLAN:   1. Welcome to Medicare preventive visit    - Pap imaged thin layer screen with HPV - recommended age 30 - 65 years (select HPV order below)  - HPV High Risk Types DNA Cervical    2. Hypothyroidism, unspecified type  No symptoms of concern  - levothyroxine (SYNTHROID/LEVOTHROID) 88 MCG tablet; Take 1 tablet (88 mcg) by mouth daily  Dispense: 90 tablet; Refill: prn    3. Hyperlipidemia LDL goal <160      4. Need for pneumococcal vaccination    - Pneumococcal vaccine 13 valent PCV13 IM (Prevnar) [[id number]]  - ADMIN MEDICARE: Pneumococcal Vaccine ()    5. Need for influenza vaccination      6. Mild dysplasia of cervix  Previous results reviewed, due for follow-up pap with hpv    7. Flu vaccine need    - ADMIN INFLUENZA VIRUS VAC    8. Encounter for screening for malignant neoplasm of cervix     - Pap imaged thin layer screen with HPV - recommended age 30 - 65 years (select HPV order below)    Patient has been advised of split billing requirements and indicates understanding: No  COUNSELING:  Reviewed preventive health counseling, as reflected in patient instructions  Special attention given to:       Regular exercise       Healthy diet/nutrition       Vision screening       Hearing screening       Dental care       Immunizations    Vaccinated for: Influenza and Pneumococcal             Osteoporosis Prevention/Bone Health    Estimated body mass index is 34.25 kg/m  as calculated from the following:    Height as of this encounter: 1.588 m (5' 2.5\").    Weight as of this encounter: 86.3 kg (190 lb 4.8 oz).    Weight management plan: Discussed healthy diet and exercise guidelines    She reports that she quit smoking about 17 years ago. She quit after 10.00 years of use. She has never used smokeless tobacco.      Appropriate preventive services were discussed with this patient, including applicable screening as appropriate for " cardiovascular disease, diabetes, osteopenia/osteoporosis, and glaucoma.  As appropriate for age/gender, discussed screening for colorectal cancer, prostate cancer, breast cancer, and cervical cancer. Checklist reviewing preventive services available has been given to the patient.    Reviewed patients plan of care and provided an AVS. The Basic Care Plan (routine screening as documented in Health Maintenance) for Vanessa meets the Care Plan requirement. This Care Plan has been established and reviewed with the Patient.    Counseling Resources:  ATP IV Guidelines  Pooled Cohorts Equation Calculator  Breast Cancer Risk Calculator  Breast Cancer: Medication to Reduce Risk  FRAX Risk Assessment  ICSI Preventive Guidelines  Dietary Guidelines for Americans, 2010  USDA's MyPlate  ASA Prophylaxis  Lung CA Screening    LENARD Higuera [facility]    Identified Health Risks:

## 2020-10-26 NOTE — PATIENT INSTRUCTIONS
Patient Education   Personalized Prevention Plan  You are due for the preventive services outlined below.  Your care team is available to assist you in scheduling these services.  If you have already completed any of these items, please share that information with your care team to update in your medical record.  Health Maintenance Due   Topic Date Due     ANNUAL REVIEW OF HM ORDERS  1955     Flu Vaccine (1) 09/01/2020     Pneumococcal Vaccine (1 of 1 - PPSV23) 09/29/2020     FALL RISK ASSESSMENT  11/21/2020     PAP  12/03/2020     HPV Follow Up  12/03/2020

## 2020-10-29 LAB
COPATH REPORT: ABNORMAL
PAP: ABNORMAL

## 2020-11-02 ENCOUNTER — PATIENT OUTREACH (OUTPATIENT)
Dept: PEDIATRICS | Facility: CLINIC | Age: 65
End: 2020-11-02

## 2020-11-02 DIAGNOSIS — N87.0 MILD DYSPLASIA OF CERVIX: ICD-10-CM

## 2020-11-02 NOTE — TELEPHONE ENCOUNTER
10/2/17 NIL, +HR HPV, not 16/18. Plan 1 yr co-test  10/9/18 NIL, +HR HPV, not 16/18. Plan colp  10/26/18 D Lo EMB--VIK 1. Plan: co-test in 1 year.   10/24/19 NIL, +HR HPV, not 16/18. Plan D Lo  12/3/19 D Lo ECC: VIK 1, Bx & endocervical polyp: benign. Plan 1 year cotest  10/26/20 ASCUS, +HR HPV, not 16/18. Plan 1 yr co-test  The 2019 guidelines also recognize that a colposcopic examination performed according to accepted standards (e.g.,using the [facility] colposcopy protocolor the ASCCP Colposcopy [id number]) confirming low-grade or normal histology reduces a patient's estimated risk of having precancer/cancer in the next 2 years. This allows patients withan HPV-positive ASC-US or LSIL result at their 1-year follow-up visit after a colposcopy confirming normal- or low-grade histology to return for repeat HPV or cotesting in 1 more year, rather than immediately return to colposcopy. Thus, incorporating a patient's history of previous HPV tests and colposcopy/biopsy results will permit detection and treatment of VIK 3+ while avoiding unnecessary interventions for patients with new HPV infectionswho are at lower risk.  11/2/20  to send letter to pt.

## 2020-11-23 ENCOUNTER — ANCILLARY PROCEDURE (OUTPATIENT)
Dept: MAMMOGRAPHY | Facility: CLINIC | Age: 65
End: 2020-11-23
Payer: MEDICARE

## 2020-11-23 DIAGNOSIS — Z12.31 VISIT FOR SCREENING MAMMOGRAM: ICD-10-CM

## 2020-11-23 PROCEDURE — 77067 SCR MAMMO BI INCL CAD: CPT | Mod: TC | Performed by: RADIOLOGY

## 2021-02-02 ENCOUNTER — TELEPHONE (OUTPATIENT)
Dept: PEDIATRICS | Facility: CLINIC | Age: 66
End: 2021-02-02

## 2021-02-02 NOTE — TELEPHONE ENCOUNTER
Patient called requesting information about COVID-19 vaccine. She wanted to know where are the site where we are giving the vaccine, I told her as if now we have 5 and I gave her the list. Helped her step up for [facility] because she will need it to schedule her covid vaccine later on to avoid call wait times Tena Toth, ANGELA on 2/2/2021 at 3:31 PM

## 2021-07-27 ENCOUNTER — TRANSFERRED RECORDS (OUTPATIENT)
Dept: HEALTH INFORMATION MANAGEMENT | Facility: CLINIC | Age: 66
End: 2021-07-27
Payer: OTHER GOVERNMENT

## 2021-09-18 ENCOUNTER — HEALTH MAINTENANCE LETTER (OUTPATIENT)
Age: 66
End: 2021-09-18

## 2021-10-06 ENCOUNTER — NURSE TRIAGE (OUTPATIENT)
Dept: NURSING | Facility: CLINIC | Age: 66
End: 2021-10-06

## 2021-10-06 ENCOUNTER — E-VISIT (OUTPATIENT)
Dept: PEDIATRICS | Facility: CLINIC | Age: 66
End: 2021-10-06
Payer: MEDICARE

## 2021-10-06 ENCOUNTER — MYC MEDICAL ADVICE (OUTPATIENT)
Dept: PEDIATRICS | Facility: CLINIC | Age: 66
End: 2021-10-06

## 2021-10-06 DIAGNOSIS — Z20.822 SUSPECTED COVID-19 VIRUS INFECTION: Primary | ICD-10-CM

## 2021-10-06 PROCEDURE — 99421 OL DIG E/M SVC 5-10 MIN: CPT | Performed by: NURSE PRACTITIONER

## 2021-10-06 NOTE — TELEPHONE ENCOUNTER
just diagnosed with covid.   Began feeling ill 9/26/21.  Keshia has no symptoms.  She'd like to get tested.  I gave her several options.  She'll do an e-visit through [employer].    COVID 19 Nurse Triage Plan/Patient Instructions    Please be aware that novel coronavirus (COVID-19) may be circulating in the community. If you develop symptoms such as fever, cough, or SOB or if you have concerns about the presence of another infection including coronavirus (COVID-19), please contact your health care provider or visit [email or web address].     Disposition/Instructions    Virtual Visit with provider recommended. Reference Visit Selection Guide.    Thank you for taking steps to prevent the spread of this virus.  o Limit your contact with others.  o Wear a simple mask to cover your cough.  o Wash your hands well and often.    Resources    [facility]: About COVID-19: [email or web address]    CDC: What to Do If You're Sick: www.cdc.gov/coronavirus/2019-ncov/about/steps-when-sick.html    CDC: Ending Home Isolation: www.cdc.gov/coronavirus/2019-ncov/hcp/disposition-in-home-patients.html     CDC: Caring for Someone: www.cdc.gov/coronavirus/2019-ncov/if-you-are-sick/care-for-someone.html     [facility]: Interim Guidance for Hospital Discharge to Home: www.health.[facility].[place]/diseases/coronavirus/hcp/hospdischarge.pdf    [facility] clinical trials (COVID-19 research studies): [email or web address]/n-clinical-trials     Below are the COVID-19 hotlines at the [facility] of Health ([facility]). Interpreters are available.   o For health questions: Call 096-499-9155 or 1-423.190.8421 (7 a.m. to 7 p.m.)  o For questions about schools and childcare: Call 189-637-1606 or 1-478.817.5656 (7 a.m. to 7 p.m.)    Reason for Disposition    [1] CLOSE CONTACT COVID-19 EXPOSURE within last 14 days AND [2] NO symptoms    Additional Information    Negative: [1] CLOSE CONTACT COVID-19 EXPOSURE within last  14 days AND [2] needs COVID-19 lab test to return to work AND [3] NO symptoms    Negative: [1] CLOSE CONTACT COVID-19 EXPOSURE within last 14 days AND [2] exposed person is a  (e.g., police or paramedic) AND [3] NO symptoms    Negative: [1] CLOSE CONTACT COVID-19 EXPOSURE within last 14 days AND [2] exposed person is a healthcare worker who was NOT using all recommended personal protective equipment (e.g., a respirator-N95 mask, eye protection, gloves, and gown) AND [3] NO symptoms    Negative: [1] Living or working in a correctional facility, long-term care facility, or shelter (i.e., congregate setting; densely populated) AND [2] where an outbreak has occurred AND [3] NO symptoms    Protocols used: CORONAVIRUS (COVID-19) EXPOSURE-A-AH 3.25    Keshia SIMMS RN [facility]

## 2021-10-07 ENCOUNTER — LAB (OUTPATIENT)
Dept: URGENT CARE | Facility: URGENT CARE | Age: 66
End: 2021-10-07
Attending: NURSE PRACTITIONER
Payer: MEDICARE

## 2021-10-07 DIAGNOSIS — Z20.822 SUSPECTED COVID-19 VIRUS INFECTION: ICD-10-CM

## 2021-10-07 PROCEDURE — U0003 INFECTIOUS AGENT DETECTION BY NUCLEIC ACID (DNA OR RNA); SEVERE ACUTE RESPIRATORY SYNDROME CORONAVIRUS 2 (SARS-COV-2) (CORONAVIRUS DISEASE [COVID-19]), AMPLIFIED PROBE TECHNIQUE, MAKING USE OF HIGH THROUGHPUT TECHNOLOGIES AS DESCRIBED BY CMS-2020-01-R: HCPCS

## 2021-10-07 PROCEDURE — U0005 INFEC AGEN DETEC AMPLI PROBE: HCPCS

## 2021-10-07 NOTE — PATIENT INSTRUCTIONS
Dear Vanessa Mckeon,    Your symptoms show that you may have coronavirus (COVID-19). This illness can cause fever, cough and trouble breathing. Many people get a mild case and get better on their own. Some people can get very sick.    Will I be tested for COVID-19?  We would like to test you for Covid-19 virus. I have placed orders for this test.     To schedule: go to your [employer] home page and scroll down to the section that says  You have an appointment that needs to be scheduled  and click the large green button that says  Schedule Now  and follow the steps to find the next available openings.    If you are unable to complete these [employer] scheduling steps, please call [phone] to schedule your testing.     Return to work/school/ guidance:  Please let your workplace manager and staffing office know when your quarantine ends     We can t give you an exact date as it depends on the above. You can calculate this on your own or work with your manager/staffing office to calculate this. (For example if you were exposed on 10/4, you would have to quarantine for 14 full days. That would be through 10/18. You could return on 10/19.)      If you receive a positive COVID-19 test result, follow the guidance of the those who are giving you the results. Usually the return to work is 10 (or in some cases 20 days from symptom onset.) If you work at [facility], you must also be cleared by Employee Occupational Health and Safety to return to work.        If you receive a negative COVID-19 test result and did not have a high risk exposure to someone with a known positive COVID-19 test, you can return to work once you're free of fever for 24 hours without fever-reducing medication and your symptoms are improving or resolved.      If you receive a negative COVID-19 test and If you had a high risk exposure to someone who has tested positive for COVID-19 then you can return to work 14 days after your last contact  with the positive individual    Note: If you have ongoing exposure to the covid positive person, this quarantine period may be more than 14 days. (For example, if you are continued to be exposed to your child who tested positive and cannot isolate from them, then the quarantine of 7-14 days can't start until your child is no longer contagious. This is typically 10 days from onset of the child's symptoms. So the total duration may be 17-24 days in this case.)    Sign up for [employer].   We know it's scary to hear that you might have COVID-19. We want to track your symptoms to make sure you're okay over the next 2 weeks. Please look for an email from [employer]--this is a free, online program that we'll use to keep in touch. To sign up, follow the link in the email you will receive. Learn more at [email or web address]    How can I take care of myself?    Get lots of rest. Drink extra fluids (unless a doctor has told you not to)    Take Tylenol (acetaminophen) or ibuprofen for fever or pain. If you have liver or kidney problems, ask your family doctor if it's okay to take Tylenol o ibuprofen    If you have other health problems (like cancer, heart failure, an organ transplant or severe kidney disease): Call your specialty clinic if you don't feel better in the next 2 days.    Know when to call 911. Emergency warning signs include:  o Trouble breathing or shortness of breath  o Pain or pressure in the chest that doesn't go away  o Feeling confused like you haven't felt before, or not being able to wake up  o Bluish-colored lips or face    Where can I get more information?  [facility] [employer] [place] - About COVID-19:   [email or web address]    CDC - What to Do If You're Sick:   www.cdc.gov/coronavirus/2019-ncov/about/steps-when-sick.html

## 2021-10-08 LAB — SARS-COV-2 RNA RESP QL NAA+PROBE: NEGATIVE

## 2021-10-11 ENCOUNTER — MYC MEDICAL ADVICE (OUTPATIENT)
Dept: PEDIATRICS | Facility: CLINIC | Age: 66
End: 2021-10-11

## 2021-10-12 ENCOUNTER — MYC MEDICAL ADVICE (OUTPATIENT)
Dept: PEDIATRICS | Facility: CLINIC | Age: 66
End: 2021-10-12

## 2021-10-27 ENCOUNTER — TRANSFERRED RECORDS (OUTPATIENT)
Dept: HEALTH INFORMATION MANAGEMENT | Facility: CLINIC | Age: 66
End: 2021-10-27
Payer: MEDICARE

## 2021-11-19 SDOH — ECONOMIC STABILITY: INCOME INSECURITY: IN THE LAST 12 MONTHS, WAS THERE A TIME WHEN YOU WERE NOT ABLE TO PAY THE MORTGAGE OR RENT ON TIME?: NO

## 2021-11-19 SDOH — ECONOMIC STABILITY: FOOD INSECURITY: WITHIN THE PAST 12 MONTHS, THE FOOD YOU BOUGHT JUST DIDN'T LAST AND YOU DIDN'T HAVE MONEY TO GET MORE.: NEVER TRUE

## 2021-11-19 SDOH — HEALTH STABILITY: PHYSICAL HEALTH: ON AVERAGE, HOW MANY DAYS PER WEEK DO YOU ENGAGE IN MODERATE TO STRENUOUS EXERCISE (LIKE A BRISK WALK)?: 2 DAYS

## 2021-11-19 SDOH — ECONOMIC STABILITY: FOOD INSECURITY: WITHIN THE PAST 12 MONTHS, YOU WORRIED THAT YOUR FOOD WOULD RUN OUT BEFORE YOU GOT MONEY TO BUY MORE.: SOMETIMES TRUE

## 2021-11-19 SDOH — HEALTH STABILITY: PHYSICAL HEALTH: ON AVERAGE, HOW MANY MINUTES DO YOU ENGAGE IN EXERCISE AT THIS LEVEL?: 40 MIN

## 2021-11-19 SDOH — ECONOMIC STABILITY: INCOME INSECURITY: HOW HARD IS IT FOR YOU TO PAY FOR THE VERY BASICS LIKE FOOD, HOUSING, MEDICAL CARE, AND HEATING?: SOMEWHAT HARD

## 2021-11-19 ASSESSMENT — ENCOUNTER SYMPTOMS
HEMATOCHEZIA: 0
HEARTBURN: 0
PARESTHESIAS: 0
DYSURIA: 0
COUGH: 0
DIARRHEA: 0
BREAST MASS: 0
DIZZINESS: 0
HEMATURIA: 0
SORE THROAT: 0
JOINT SWELLING: 0
MYALGIAS: 0
ARTHRALGIAS: 0
NERVOUS/ANXIOUS: 0
WEAKNESS: 0
FEVER: 0
FREQUENCY: 0
EYE PAIN: 0
CHILLS: 0
NAUSEA: 0
HEADACHES: 0
SHORTNESS OF BREATH: 0
ABDOMINAL PAIN: 0
CONSTIPATION: 0
PALPITATIONS: 0

## 2021-11-19 ASSESSMENT — LIFESTYLE VARIABLES
HOW OFTEN DO YOU HAVE A DRINK CONTAINING ALCOHOL: MONTHLY OR LESS
HOW OFTEN DO YOU HAVE SIX OR MORE DRINKS ON ONE OCCASION: NEVER
HOW MANY STANDARD DRINKS CONTAINING ALCOHOL DO YOU HAVE ON A TYPICAL DAY: 1 OR 2

## 2021-11-19 ASSESSMENT — SOCIAL DETERMINANTS OF HEALTH (SDOH)
HOW OFTEN DO YOU ATTEND CHURCH OR RELIGIOUS SERVICES?: MORE THAN 4 TIMES PER YEAR
IN A TYPICAL WEEK, HOW MANY TIMES DO YOU TALK ON THE PHONE WITH FAMILY, FRIENDS, OR NEIGHBORS?: MORE THAN THREE TIMES A WEEK
HOW OFTEN DO YOU GET TOGETHER WITH FRIENDS OR RELATIVES?: PATIENT DECLINED
DO YOU BELONG TO ANY CLUBS OR ORGANIZATIONS SUCH AS CHURCH GROUPS UNIONS, FRATERNAL OR ATHLETIC GROUPS, OR SCHOOL GROUPS?: NO

## 2021-11-19 ASSESSMENT — ACTIVITIES OF DAILY LIVING (ADL): CURRENT_FUNCTION: NO ASSISTANCE NEEDED

## 2021-11-22 ENCOUNTER — OFFICE VISIT (OUTPATIENT)
Dept: PEDIATRICS | Facility: CLINIC | Age: 66
End: 2021-11-22
Payer: MEDICARE

## 2021-11-22 VITALS
HEIGHT: 63 IN | WEIGHT: 197.2 LBS | SYSTOLIC BLOOD PRESSURE: 108 MMHG | DIASTOLIC BLOOD PRESSURE: 78 MMHG | BODY MASS INDEX: 34.94 KG/M2 | TEMPERATURE: 97.7 F | HEART RATE: 88 BPM | OXYGEN SATURATION: 96 %

## 2021-11-22 DIAGNOSIS — N87.0 MILD DYSPLASIA OF CERVIX: ICD-10-CM

## 2021-11-22 DIAGNOSIS — Z23 HIGH PRIORITY FOR 2019-NCOV VACCINE: ICD-10-CM

## 2021-11-22 DIAGNOSIS — Z00.00 ENCOUNTER FOR MEDICARE ANNUAL WELLNESS EXAM: Primary | ICD-10-CM

## 2021-11-22 DIAGNOSIS — E03.9 HYPOTHYROIDISM, UNSPECIFIED TYPE: ICD-10-CM

## 2021-11-22 DIAGNOSIS — D50.9 IRON DEFICIENCY ANEMIA, UNSPECIFIED IRON DEFICIENCY ANEMIA TYPE: ICD-10-CM

## 2021-11-22 LAB — TSH SERPL DL<=0.005 MIU/L-ACNC: 3.66 MU/L (ref 0.4–4)

## 2021-11-22 PROCEDURE — G0145 SCR C/V CYTO,THINLAYER,RESCR: HCPCS | Performed by: NURSE PRACTITIONER

## 2021-11-22 PROCEDURE — 84443 ASSAY THYROID STIM HORMONE: CPT | Performed by: NURSE PRACTITIONER

## 2021-11-22 PROCEDURE — 90732 PPSV23 VACC 2 YRS+ SUBQ/IM: CPT | Performed by: NURSE PRACTITIONER

## 2021-11-22 PROCEDURE — G0124 SCREEN C/V THIN LAYER BY MD: HCPCS | Performed by: PATHOLOGY

## 2021-11-22 PROCEDURE — G0009 ADMIN PNEUMOCOCCAL VACCINE: HCPCS | Performed by: NURSE PRACTITIONER

## 2021-11-22 PROCEDURE — 0004A COVID-19,PF,PFIZER (12+ YRS): CPT | Performed by: NURSE PRACTITIONER

## 2021-11-22 PROCEDURE — 36415 COLL VENOUS BLD VENIPUNCTURE: CPT | Performed by: NURSE PRACTITIONER

## 2021-11-22 PROCEDURE — 87624 HPV HI-RISK TYP POOLED RSLT: CPT | Performed by: NURSE PRACTITIONER

## 2021-11-22 PROCEDURE — G0438 PPPS, INITIAL VISIT: HCPCS | Performed by: NURSE PRACTITIONER

## 2021-11-22 PROCEDURE — 91300 COVID-19,PF,PFIZER (12+ YRS): CPT | Performed by: NURSE PRACTITIONER

## 2021-11-22 PROCEDURE — 99213 OFFICE O/P EST LOW 20 MIN: CPT | Mod: 25 | Performed by: NURSE PRACTITIONER

## 2021-11-22 RX ORDER — LEVOTHYROXINE SODIUM 88 UG/1
88 TABLET ORAL DAILY
Qty: 90 TABLET | Status: SHIPPED | OUTPATIENT
Start: 2021-11-22 | End: 2022-10-19 | Stop reason: DRUGHIGH

## 2021-11-22 ASSESSMENT — ENCOUNTER SYMPTOMS
SORE THROAT: 0
FEVER: 0
ARTHRALGIAS: 0
HEARTBURN: 0
PALPITATIONS: 0
HEMATOCHEZIA: 0
ABDOMINAL PAIN: 0
FREQUENCY: 0
PARESTHESIAS: 0
DIZZINESS: 0
CONSTIPATION: 0
COUGH: 0
JOINT SWELLING: 0
HEADACHES: 0
MYALGIAS: 0
DIARRHEA: 0
HEMATURIA: 0
WEAKNESS: 0
NAUSEA: 0
DYSURIA: 0
BREAST MASS: 0
EYE PAIN: 0
CHILLS: 0
NERVOUS/ANXIOUS: 0
SHORTNESS OF BREATH: 0

## 2021-11-22 ASSESSMENT — ACTIVITIES OF DAILY LIVING (ADL): CURRENT_FUNCTION: NO ASSISTANCE NEEDED

## 2021-11-22 ASSESSMENT — MIFFLIN-ST. JEOR: SCORE: 1411

## 2021-11-22 NOTE — PROGRESS NOTES
"SUBJECTIVE:   Vanessa Mckeon is a 66 year old female who presents for Preventive Visit.    Patient has been advised of split billing requirements and indicates understanding: Yes   Are you in the first 12 months of your Medicare coverage?  No    Healthy Habits:     In general, how would you rate your overall health?  Good    Frequency of exercise:  2-3 days/week    Duration of exercise:  15-30 minutes    Do you usually eat at least 4 servings of fruit and vegetables a day, include whole grains    & fiber and avoid regularly eating high fat or \"junk\" foods?  No    Taking medications regularly:  Yes    Medication side effects:  None    Ability to successfully perform activities of daily living:  No assistance needed    Home Safety:  No safety concerns identified    Hearing Impairment:  No hearing concerns    In the past 6 months, have you been bothered by leaking of urine?  No    In general, how would you rate your overall mental or emotional health?  Good      PHQ-2 Total Score: 0    Additional concerns today:  No    Pt to get pfizer booster tomorrow in [facility] pharmacy. Can she do it today?    History of hypothyroidism, no symptoms of concner  TSH   Date Value Ref Range Status   11/22/2021 3.66 0.40 - 4.00 mU/L Final   10/19/2020 2.04 0.40 - 4.00 mU/L Final   06/26/2020 5.14 (H) 0.40 - 4.00 mU/L Final   10/24/2019 4.65 (H) 0.40 - 4.00 mU/L Final   10/09/2018 3.86 0.40 - 4.00 mU/L Final   10/02/2017 3.24 0.40 - 4.00 mU/L Final         Do you feel safe in your environment? Yes    Have you ever done Advance Care Planning? (For example, a Health Directive, POLST, or a discussion with a medical provider or your loved ones about your wishes): Yes, advance care planning is on file.       Fall risk  Fallen 2 or more times in the past year?: No  Any fall with injury in the past year?: No    Cognitive Screening   1) Repeat 3 items (Leader, Season, Table)    2) Clock draw:   NORMAL  3) 3 item recall:   Recalls 3 " objects  Results: 3 items recalled: COGNITIVE IMPAIRMENT LESS LIKELY    Mini-CogTM Copyright KASSI Miller. Licensed by the author for use in [facility]; reprinted with permission (chandler@.[facility] [place]). All rights reserved.      Do you have sleep apnea, excessive snoring or daytime drowsiness?: no    Reviewed and updated as needed this visit by clinical staff  Tobacco  Allergies    Med Hx  Surg Hx  Fam Hx  Soc Hx       Reviewed and updated as needed this visit by Provider               Social History     Tobacco Use     Smoking status: Former Smoker     Packs/day: 0.50     Years: 10.00     Pack years: 5.00     Types: Cigarettes     Quit date: 2003     Years since quittin.6     Smokeless tobacco: Never Used   Substance Use Topics     Alcohol use: Yes     Comment: very little     Alcohol Use 2021   Prescreen: >3 drinks/day or >7 drinks/week? No   Prescreen: >3 drinks/day or >7 drinks/week? -     -------------------------------------    Current providers sharing in care for this patient include:   Patient Care Team:  Tatiana Leahy APRN CNP as PCP - General (Family Practice)  Tatiana Leahy APRN CNP as Assigned PCP    The following health maintenance items are reviewed in Epic and correct as of today:  Health Maintenance Due   Topic Date Due     HPV FOLLOW-UP  10/26/2021     Lab work is in process  Pneumonia Vaccine:due for 23  Mammogram Screening: Mammogram Screening: Recommended mammography every 1-2 years with patient discussion and risk factor consideration  History of abnormal Pap smear: YES - updated in Problem List and Health Maintenance accordingly  Last 3 Pap and HPV Results:   PAP / HPV Latest Ref Rng & Units 2021 10/26/2020 10/24/2019   PAP   Atypical squamous cells of undetermined significance (ASC-US)(A) - -   PAP (Historical) - - ASC-US(A) NIL   HPV16 NEG:Negative - Negative Negative   HPV18 NEG:Negative - Negative Negative   HRHPV NEG:Negative - Positive(A)  Positive(A)       FHS-7:   Breast CA Risk Assessment (FHS-7) 11/19/2021 11/26/2021   Did any of your first-degree relatives have breast or ovarian cancer? Yes Yes   Did any of your relatives have bilateral breast cancer? Unknown No   Did any man in your family have breast cancer? No No   Did any woman in your family have breast and ovarian cancer? Yes No   Did any woman in your family have breast cancer before age 50 y? No No   Do you have 2 or more relatives with breast and/or ovarian cancer? No No   Do you have 2 or more relatives with breast and/or bowel cancer? No No       Mammogram Screening: Recommended mammography every 1-2 years with patient discussion and risk factor consideration  Pertinent mammograms are reviewed under the imaging tab.    Review of Systems   Constitutional: Negative for chills and fever.   HENT: Negative for congestion, ear pain, hearing loss and sore throat.    Eyes: Negative for pain and visual disturbance.   Respiratory: Negative for cough and shortness of breath.    Cardiovascular: Negative for chest pain, palpitations and peripheral edema.   Gastrointestinal: Negative for abdominal pain, constipation, diarrhea, heartburn, hematochezia and nausea.   Breasts:  Negative for tenderness, breast mass and discharge.   Genitourinary: Negative for dysuria, frequency, genital sores, hematuria, pelvic pain, urgency, vaginal bleeding and vaginal discharge.   Musculoskeletal: Negative for arthralgias, joint swelling and myalgias.   Skin: Negative for rash.   Neurological: Negative for dizziness, weakness, headaches and paresthesias.   Psychiatric/Behavioral: Negative for mood changes. The patient is not nervous/anxious.      Constitutional, HEENT, cardiovascular, pulmonary, GI, , musculoskeletal, neuro, skin, endocrine and psych systems are negative, except as otherwise noted.    OBJECTIVE:   /78 (BP Location: Right arm, Patient Position: Sitting, Cuff Size: Adult Large)   Pulse 88    "Temp 97.7  F (36.5  C) (Tympanic)   Ht 1.612 m (5' 3.47\")   Wt 89.4 kg (197 lb 3.2 oz)   LMP 05/19/2008   SpO2 96%   BMI 34.42 kg/m   Estimated body mass index is 34.42 kg/m  as calculated from the following:    Height as of this encounter: 1.612 m (5' 3.47\").    Weight as of this encounter: 89.4 kg (197 lb 3.2 oz).  Physical Exam  GENERAL: healthy, alert and no distress  EYES: Eyes grossly normal to inspection, PERRL and conjunctivae and sclerae normal  HENT: ear canals and TM's normal, nose and mouth without ulcers or lesions  NECK: no adenopathy, no asymmetry, masses, or scars and thyroid normal to palpation  RESP: lungs clear to auscultation - no rales, rhonchi or wheezes  CV: regular rate and rhythm, normal S1 S2, no S3 or S4, no murmur, click or rub, no peripheral edema and peripheral pulses strong  ABDOMEN: soft, nontender, no hepatosplenomegaly, no masses and bowel sounds normal   (female): normal female external genitalia, normal urethral meatus, vaginal mucosa, normal cervix/adnexa/uterus without masses or discharge  MS: no gross musculoskeletal defects noted, no edema  PSYCH: mentation appears normal, affect normal/bright      ASSESSMENT / PLAN:   (Z00.00) Encounter for Medicare annual wellness exam  (primary encounter diagnosis)  Comment:   Plan: Pap Screen with HPV - recommended age 30 - 65         years          (E03.9) Hypothyroidism, unspecified type  Comment: stable, due for labs  Plan: TSH WITH FREE T4 REFLEX, levothyroxine         (SYNTHROID/LEVOTHROID) 88 MCG tablet          (D50.9) Iron deficiency anemia, unspecified iron deficiency anemia type  Comment: stable  Plan:     (N87.0) Mild dysplasia of cervix  Comment: due for follow-up pap  Plan: Pap Screen with HPV - recommended age 30 - 65         years          (Z23) High priority for 2019-nCoV vaccine  Comment:   Plan: COVID-19,PF,PFIZER (12+ Yrs PURPLE LABEL)          Patient has been advised of split billing requirements and indicates " "understanding: Yes  COUNSELING:  Reviewed preventive health counseling, as reflected in patient instructions  Special attention given to:       Regular exercise       Healthy diet/nutrition       Osteoporosis prevention/bone health    Estimated body mass index is 34.42 kg/m  as calculated from the following:    Height as of this encounter: 1.612 m (5' 3.47\").    Weight as of this encounter: 89.4 kg (197 lb 3.2 oz).        She reports that she quit smoking about 18 years ago. Her smoking use included cigarettes. She has a 5.00 pack-year smoking history. She has never used smokeless tobacco.      Appropriate preventive services were discussed with this patient, including applicable screening as appropriate for cardiovascular disease, diabetes, osteopenia/osteoporosis, and glaucoma.  As appropriate for age/gender, discussed screening for colorectal cancer, prostate cancer, breast cancer, and cervical cancer. Checklist reviewing preventive services available has been given to the patient.    Reviewed patients plan of care and provided an AVS. The Basic Care Plan (routine screening as documented in Health Maintenance) for Vanessa meets the Care Plan requirement. This Care Plan has been established and reviewed with the Patient.    Counseling Resources:  ATP IV Guidelines  Pooled Cohorts Equation Calculator  Breast Cancer Risk Calculator  Breast Cancer: Medication to Reduce Risk  FRAX Risk Assessment  ICSI Preventive Guidelines  Dietary Guidelines for Americans, 2010  USDA's MyPlate  ASA Prophylaxis  Lung CA Screening    LENARD [facility] [employer]    Identified Health Risks:    The patient was counseled and encouraged to consider modifying their diet and eating habits. She was provided with information on recommended healthy diet options.  "

## 2021-11-22 NOTE — PATIENT INSTRUCTIONS
Patient Education   Personalized Prevention Plan  You are due for the preventive services outlined below.  Your care team is available to assist you in scheduling these services.  If you have already completed any of these items, please share that information with your care team to update in your medical record.  Health Maintenance Due   Topic Date Due     ANNUAL REVIEW OF HM ORDERS  Never done     COVID-19 Vaccine (3 - Booster for Pfizer series) 09/15/2021     Thyroid Function Lab  10/19/2021     FALL RISK ASSESSMENT  10/26/2021     PAP  10/26/2021     HPV Follow Up  10/26/2021     Pneumococcal Vaccine (2 of 2 - PPSV23) 10/26/2021       Understanding USDA MyPlate  The USDA has guidelines to help you make healthy food choices. These are called MyPlate. MyPlate shows the food groups that make up healthy meals using the image of a place setting. Before you eat, think about the healthiest choices for what to put on your plate or in your cup or bowl. To learn more about building a healthy plate, visit www.choosemyplate.gov.    The food groups    Fruits. Any fruit or 100% fruit juice counts as part of the Fruit Group. Fruits may be fresh, canned, frozen, or dried, and may be whole, cut-up, or pureed. Make 1/2 of your plate fruits and vegetables.    Vegetables. Any vegetable or 100% vegetable juice counts as a member of the Vegetable Group. Vegetables may be fresh, frozen, canned, or dried. They can be served raw or cooked and may be whole, cut-up, or mashed. Make 1/2 of your plate fruits and vegetables.    Grains. All foods made from grains are part of the Grains Group. These include wheat, rice, oats, cornmeal, and barley. Grains are often used to make foods such as bread, pasta, oatmeal, cereal, tortillas, and grits. Grains should be no more than 1/4 of your plate. At least half of your grains should be whole grains.    Protein. This group includes meat, poultry, seafood, beans and peas, eggs, processed soy products  (such as tofu), nuts (including nut butters), and seeds. Make protein choices no more than 1/4 of your plate. Meat and poultry choices should be lean or low fat.    Dairy. The Dairy Group includes all fluid milk products and foods made from milk that contain calcium, such as yogurt and cheese. (Foods that have little calcium, such as cream, butter, and cream cheese, are not part of this group.) Most dairy choices should be low-fat or fat-free.    Oils. Oils aren't a food group, but they do contain essential nutrients. However it's important to watch your intake of oils. These are fats that are liquid at room temperature. They include canola, corn, olive, soybean, vegetable, and sunflower oil. Foods that are mainly oil include mayonnaise, certain salad dressings, and soft margarines. You likely already get your daily oil allowance from the foods you eat.  Things to limit  Eating healthy also means limiting these things in your diet:       Salt (sodium). Many processed foods have a lot of sodium. To keep sodium intake down, eat fresh vegetables, meats, poultry, and seafood when possible. Purchase low-sodium, reduced-sodium, or no-salt-added food products at the store. And don't add salt to your meals at home. Instead, season them with herbs and spices such as dill, oregano, cumin, and paprika. Or try adding flavor with lemon or lime zest and juice.    Saturated fat. Saturated fats are most often found in animal products such as beef, pork, and chicken. They are often solid at room temperature, such as butter. To reduce your saturated fat intake, choose leaner cuts of meat and poultry. And try healthier cooking methods such as grilling, broiling, roasting, or baking. For a simple lower-fat swap, use plain nonfat yogurt instead of mayonnaise when making potato salad or macaroni salad.    Added sugars. These are sugars added to foods. They are in foods such as ice cream, candy, soda, fruit drinks, sports drinks, energy  drinks, cookies, pastries, jams, and syrups. Cut down on added sugars by sharing sweet treats with a family member or friend. You can also choose fruit for dessert, and drink water or other unsweetened beverages.     [employer] last reviewed this educational content on 6/1/2020 2000-2021 [employer]. All rights reserved. This information is not intended as a substitute for professional medical care. Always follow your healthcare professional's instructions.

## 2021-11-24 LAB
BKR LAB AP GYN ADEQUACY: ABNORMAL
BKR LAB AP GYN INTERPRETATION: ABNORMAL
BKR LAB AP HPV REFLEX: ABNORMAL
BKR LAB AP PREVIOUS ABNL DX: ABNORMAL
BKR LAB AP PREVIOUS ABNORMAL: ABNORMAL
PATH REPORT.COMMENTS IMP SPEC: ABNORMAL
PATH REPORT.COMMENTS IMP SPEC: ABNORMAL
PATH REPORT.RELEVANT HX SPEC: ABNORMAL

## 2021-11-26 ENCOUNTER — ANCILLARY PROCEDURE (OUTPATIENT)
Dept: MAMMOGRAPHY | Facility: CLINIC | Age: 66
End: 2021-11-26
Attending: NURSE PRACTITIONER
Payer: MEDICARE

## 2021-11-26 DIAGNOSIS — Z12.31 VISIT FOR SCREENING MAMMOGRAM: ICD-10-CM

## 2021-11-26 PROCEDURE — 77067 SCR MAMMO BI INCL CAD: CPT | Mod: TC | Performed by: RADIOLOGY

## 2021-11-30 LAB
HUMAN PAPILLOMA VIRUS 16 DNA: NEGATIVE
HUMAN PAPILLOMA VIRUS 18 DNA: NEGATIVE
HUMAN PAPILLOMA VIRUS FINAL DIAGNOSIS: NORMAL
HUMAN PAPILLOMA VIRUS OTHER HR: NEGATIVE

## 2021-12-02 ENCOUNTER — PATIENT OUTREACH (OUTPATIENT)
Dept: PEDIATRICS | Facility: CLINIC | Age: 66
End: 2021-12-02
Payer: MEDICARE

## 2021-12-02 DIAGNOSIS — N87.0 MILD DYSPLASIA OF CERVIX: ICD-10-CM

## 2021-12-12 ENCOUNTER — OFFICE VISIT (OUTPATIENT)
Dept: URGENT CARE | Facility: URGENT CARE | Age: 66
End: 2021-12-12
Payer: MEDICARE

## 2021-12-12 VITALS
DIASTOLIC BLOOD PRESSURE: 79 MMHG | SYSTOLIC BLOOD PRESSURE: 133 MMHG | WEIGHT: 198.7 LBS | OXYGEN SATURATION: 93 % | HEART RATE: 87 BPM | TEMPERATURE: 97.7 F | BODY MASS INDEX: 34.68 KG/M2

## 2021-12-12 DIAGNOSIS — J06.9 VIRAL URI: Primary | ICD-10-CM

## 2021-12-12 DIAGNOSIS — R09.81 CONGESTION OF PARANASAL SINUS: ICD-10-CM

## 2021-12-12 PROCEDURE — 99213 OFFICE O/P EST LOW 20 MIN: CPT | Performed by: PHYSICIAN ASSISTANT

## 2021-12-12 PROCEDURE — U0003 INFECTIOUS AGENT DETECTION BY NUCLEIC ACID (DNA OR RNA); SEVERE ACUTE RESPIRATORY SYNDROME CORONAVIRUS 2 (SARS-COV-2) (CORONAVIRUS DISEASE [COVID-19]), AMPLIFIED PROBE TECHNIQUE, MAKING USE OF HIGH THROUGHPUT TECHNOLOGIES AS DESCRIBED BY CMS-2020-01-R: HCPCS | Performed by: PHYSICIAN ASSISTANT

## 2021-12-12 PROCEDURE — U0005 INFEC AGEN DETEC AMPLI PROBE: HCPCS | Performed by: PHYSICIAN ASSISTANT

## 2021-12-12 NOTE — PROGRESS NOTES
ASSESSMENT/PLAN:     (J06.9) Viral URI  (primary encounter diagnosis)        MDM: Acute onset viral URI symptoms. Non-specific viral upper respiratory infection and mild immunization Covid-19 viral upper respiratory infection are potential causes for current symptoms. Patient is offered, and accepted, Covid-19 screening today. No evidence of secondary bacterial infection. No evidence of respiratory distress.     Plan:     Follow-up if symptoms not resolved in the next week,sooner if any sudden worsening of current symptoms or if new symptoms develop       December 12, 2021 [facility] Plan:      At this time, your cold symptoms appear to be caused by a virus. You may have a common cold virus. It is also possible you could have a Covid-19 virus.      A Covid-19 PCR test was done here today.  The result typically comes back in 1-2 days (watch your MyChart or call the urgent care clinic for your result in 1-2 days).     Please continue with home comfort care measures (including as needed Tylenol  for sore throat , sinus pain, and fever) and extra rest and fluids.     Follow-up immediately if you have any sudden, severe, worsening of your symptoms or if you develop any of the below:         Chest pain, shortness of breath, wheezing, or difficulty breathing    Coughing up blood    Very severe pain with swallowing, especially if it goes along with a muffled voice        Please stay home/self-isolate (no contact with others outside of home) until your Covid-19 test result is back (this typically takes 1-2 days), you are without fever for 24 hours (without use of fever reducing medication) and your symptoms are improving.           (R09.81) Congestion of paranasal sinus  Plan: Symptomatic COVID-19 Virus (Coronavirus) by PCR        Nose    -------------------------------------------------------      SUBJECTIVE:   Vanessa Mckeon  presents to clinic today for evaluation of acute onset nasal congestion, runny nose,  sinus pain/pressure, waxing and waning sinus distribution headache and waxing and waning left ear pain x 4-5 days duration.     COVID-19,PF,Pfizer (12+ Yrs) 11/22/2021, 3/15/2021, 2/22/2021           ROS: No associated fever, chills, cough, shortness of breath, wheezing, sore throat, abdominal pain, nausea, vomiting, diarrhea, body aches, severe headaches, neck stiffness rashes, joint swelling or other acute illness symptoms.       Past Medical History:   Diagnosis Date     Anemia      Arthritis     Aleve occasionally     Benign neoplasm of colon      Cervical high risk HPV (human papillomavirus) test positive 10/02/2017 & 10/9/2018 & 10/24/2019    10/2/17 NIL, +HR HPV, not 16/18     Gastro-oesophageal reflux disease      History of blood transfusion     Iron deficiency     Hypertrophy of breast     fibrous left breast- benign     Malignant neoplasm (H)      Mild dysplasia of cervix 12/3/2019     Pure hypercholesterolemia      Skin cancer      Thyroid disease     Levothyroxine       Current Outpatient Medications   Medication     fluticasone (FLONASE) 50 MCG/ACT nasal spray     Iron Combinations (IRON COMPLEX PO)     levothyroxine (SYNTHROID/LEVOTHROID) 88 MCG tablet     Multiple Vitamins-Minerals (MULTIVITAMIN OR)     No current facility-administered medications for this visit.       Allergies   Allergen Reactions     Flu Virus Vaccine      Hypertension,flushing  Other reaction(s): Hypertension  Hypertension,flushing       OBJECTIVE:   /79   Pulse 87   Temp 97.7  F (36.5  C)   Wt 90.1 kg (198 lb 11.2 oz)   LMP 05/19/2008   SpO2 93%   BMI 34.68 kg/m          General appearance: alert and no apparent distress   Skin color is pink and without rash.   HEENT:   Conjunctiva not injected. Sclera clear.   Left TM is normal: no effusions, no erythema, and normal landmarks.   Right TM is normal: no effusions, no erythema, and normal landmarks.   Nasal mucosa is congested  Oropharyngeal exam is normal other than  evidence of post-nasal drip: no lesions, erythema, adenopathy or exudate.   Neck is supple, full range of motion with no adenopathy   CARDIAC:NORMAL - regular rate and rhythm without murmur.   RESP: Normal - CTA without rales, rhonchi, or wheezing.   NEURO: Alert and oriented.  Normal speech and mentation.  CN II/XII grossly intact.  Gait within normal limits.

## 2021-12-12 NOTE — PATIENT INSTRUCTIONS
Patient Education     Viral Upper Respiratory Illness (Adult)    You have a viral upper respiratory illness (URI), which is another term for the common cold. This illness is contagious during the first few days. It is spread through the air by coughing and sneezing. It may also be spread by direct contact (touching the sick person and then touching your own eyes, nose, or mouth). Frequent handwashing will decrease risk of spread. Most viral illnesses go away within 7 to 10 days with rest and simple home remedies. Sometimes the illness may last for several weeks. Antibiotics will not kill a virus, and they are generally not prescribed for this condition.  Home care    If symptoms are severe, rest at home for the first 2 to 3 days. When you resume activity, don't let yourself get too tired.    Don't smoke. If you need help stopping, talk with your healthcare provider.    Avoid being exposed to cigarette smoke (yours or others ).    You may use acetaminophen or ibuprofen to control pain and fever, unless another medicine was prescribed. If you have chronic liver or kidney disease, have ever had a stomach ulcer or gastrointestinal bleeding, or are taking blood-thinning medicines, talk with your healthcare provider before using these medicines. Aspirin should never be given to anyone under 18 years of age who is ill with a viral infection or fever. It may cause severe liver or brain damage.    Your appetite may be poor, so a light diet is fine. Stay well hydrated by drinking 6 to 8 glasses of fluids per day (water, soft drinks, juices, tea, or soup). Extra fluids will help loosen secretions in the nose and lungs.    Over-the-counter cold medicines will not shorten the length of time you re sick, but they may be helpful for the following symptoms: cough, sore throat, and nasal and sinus congestion. If you take prescription medicines, ask your healthcare provider or pharmacist which over-the-counter medicines are safe to  use. (Note: Don't use decongestants if you have high blood pressure.)  Follow-up care  Follow up with your healthcare provider, or as advised.  When to seek medical advice  Call your healthcare provider right away if any of these occur:    Cough with lots of colored sputum (mucus)    Severe headache; face, neck, or ear pain    Difficulty swallowing due to throat pain    Fever of 100.4 F (38 C) or higher, or as directed by your healthcare provider  Call 911  Call 911 if any of these occur:    Chest pain, shortness of breath, wheezing, or difficulty breathing    Coughing up blood    Very severe pain with swallowing, especially if it goes along with a muffled voice   [employer] last reviewed this educational content on 6/1/2018 2000-2021 [employer]. All rights reserved. This information is not intended as a substitute for professional medical care. Always follow your healthcare professional's instructions.                     December 12, 2021 [facility]:      At this time, your cold symptoms appear to be caused by a virus. You may have a common cold virus. It is also possible you could have a Covid-19 virus.      A Covid-19 PCR test was done here today.  The result typically comes back in 1-2 days (watch your MyChart or call the urgent care clinic for your result in 1-2 days).     Please continue with home comfort care measures (including as needed Tylenol  for sore throat , sinus pain, and fever) and extra rest and fluids.     Follow-up immediately if you have any sudden, severe, worsening of your symptoms or if you develop any of the below:         Chest pain, shortness of breath, wheezing, or difficulty breathing    Coughing up blood    Very severe pain with swallowing, especially if it goes along with a muffled voice        Please stay home/self-isolate (no contact with others outside of home) until your Covid-19 test result is back (this typically takes 1-2 days), you are without fever  for 24 hours (without use of fever reducing medication) and your symptoms are improving.

## 2021-12-13 LAB — SARS-COV-2 RNA RESP QL NAA+PROBE: NEGATIVE

## 2022-04-27 ENCOUNTER — TRANSFERRED RECORDS (OUTPATIENT)
Dept: HEALTH INFORMATION MANAGEMENT | Facility: CLINIC | Age: 67
End: 2022-04-27
Payer: MEDICARE

## 2022-05-23 ENCOUNTER — OFFICE VISIT (OUTPATIENT)
Dept: URGENT CARE | Facility: URGENT CARE | Age: 67
End: 2022-05-23
Payer: MEDICARE

## 2022-05-23 VITALS
TEMPERATURE: 98 F | DIASTOLIC BLOOD PRESSURE: 80 MMHG | HEART RATE: 78 BPM | RESPIRATION RATE: 18 BRPM | SYSTOLIC BLOOD PRESSURE: 140 MMHG | OXYGEN SATURATION: 98 %

## 2022-05-23 DIAGNOSIS — H69.92 DYSFUNCTION OF LEFT EUSTACHIAN TUBE: Primary | ICD-10-CM

## 2022-05-23 PROCEDURE — 99213 OFFICE O/P EST LOW 20 MIN: CPT | Performed by: PHYSICIAN ASSISTANT

## 2022-05-23 NOTE — PROGRESS NOTES
SUBJECTIVE:   Vanessa Mckeon is a 66 year old female presenting with a chief complaint of left ear pain and pressure for the past 4 days and not getting better.  No recent cold sx .  No fevers.  Otherwise good.  No hx of ear issues;..  Onset of symptoms was 4 day(s) ago.  Course of illness is same.    Severity mild  Current and Associated symptoms: negative other than stated above  Treatment measures tried include Aleve.  Had Flonase  Predisposing factors include None.    Past Medical History:   Diagnosis Date     Anemia      Arthritis     Aleve occasionally     Benign neoplasm of colon      Cervical high risk HPV (human papillomavirus) test positive 10/02/2017 & 10/9/2018 & 10/24/2019    10/2/17 NIL, +HR HPV, not 16/18     Gastro-oesophageal reflux disease      History of blood transfusion     Iron deficiency     Hypertrophy of breast     fibrous left breast- benign     Malignant neoplasm (H)      Mild dysplasia of cervix 12/3/2019     Pure hypercholesterolemia      Skin cancer      Thyroid disease     Levothyroxine     Current Outpatient Medications   Medication Sig Dispense Refill     fluticasone (FLONASE) 50 MCG/ACT nasal spray Spray 2 sprays into both nostrils daily 1 Package 2     Iron Combinations (IRON COMPLEX PO)        levothyroxine (SYNTHROID/LEVOTHROID) 88 MCG tablet Take 1 tablet (88 mcg) by mouth daily 90 tablet prn     Multiple Vitamins-Minerals (MULTIVITAMIN OR) Take  by mouth.       Social History     Tobacco Use     Smoking status: Former Smoker     Packs/day: 0.50     Years: 10.00     Pack years: 5.00     Types: Cigarettes     Quit date: 2003     Years since quittin.1     Smokeless tobacco: Never Used   Substance Use Topics     Alcohol use: Yes     Comment: very little       ROS:  Review of systems negative except as stated above.    OBJECTIVE:  BP (!) 140/80   Pulse 78   Temp 98  F (36.7  C)   Resp 18   LMP 2008   SpO2 98%   GENERAL APPEARANCE: healthy, alert and no  distress  EYES: EOMI,  PERRL, conjunctiva clear  HENT: ear canals and TM's normal right  Left TM clear fluid.  Nose and mouth without ulcers, erythema or lesions  NECK: supple, nontender, no lymphadenopathy  RESP: lungs clear to auscultation - no rales, rhonchi or wheezes  CV: regular rates and rhythm, normal S1 S2, no murmur noted  SKIN: no suspicious lesions or rashes    assessment/plan:  (H69.82) Dysfunction of left eustachian tube  (primary encounter diagnosis)  Comment:   Plan:   No signs of infection.  Continue Flonase and OTC med for sx relief.  Follow-up with PCP as needed if sx worsen

## 2022-07-13 ENCOUNTER — OFFICE VISIT (OUTPATIENT)
Dept: PEDIATRICS | Facility: CLINIC | Age: 67
End: 2022-07-13
Payer: MEDICARE

## 2022-07-13 VITALS
HEIGHT: 63 IN | WEIGHT: 197 LBS | HEART RATE: 95 BPM | DIASTOLIC BLOOD PRESSURE: 78 MMHG | TEMPERATURE: 97.8 F | OXYGEN SATURATION: 96 % | RESPIRATION RATE: 14 BRPM | BODY MASS INDEX: 34.91 KG/M2 | SYSTOLIC BLOOD PRESSURE: 136 MMHG

## 2022-07-13 DIAGNOSIS — N95.0 POSTMENOPAUSAL BLEEDING: Primary | ICD-10-CM

## 2022-07-13 DIAGNOSIS — R10.2 PELVIC CRAMPING: ICD-10-CM

## 2022-07-13 DIAGNOSIS — R10.9 ABDOMINAL BLOATING WITH CRAMPS: ICD-10-CM

## 2022-07-13 DIAGNOSIS — R14.0 ABDOMINAL BLOATING WITH CRAMPS: ICD-10-CM

## 2022-07-13 LAB
CLUE CELLS: ABNORMAL
TRICHOMONAS, WET PREP: ABNORMAL
WBC'S/HIGH POWER FIELD, WET PREP: ABNORMAL
YEAST, WET PREP: ABNORMAL

## 2022-07-13 PROCEDURE — 87210 SMEAR WET MOUNT SALINE/INK: CPT | Performed by: NURSE PRACTITIONER

## 2022-07-13 PROCEDURE — 99214 OFFICE O/P EST MOD 30 MIN: CPT | Performed by: NURSE PRACTITIONER

## 2022-07-13 ASSESSMENT — PAIN SCALES - GENERAL: PAINLEVEL: NO PAIN (0)

## 2022-07-13 NOTE — PATIENT INSTRUCTIONS
Please call to schedule your radiology appointment at [facility]: # [phone], Try to get this done before you see OB-GYN    You can call OB-GYN directly at [phone] to schedule an appointment. I would like you to be seen this week or next week.    If you develop increased pain or heavy bleeding or fevers you should be seen right away.

## 2022-07-13 NOTE — PROGRESS NOTES
Assessment & Plan   Postmenopausal bleeding  Pelvic cramping  Abdominal bloating with cramps  Can't miss diagnosis is malignancy, will proceed with imaging and OBGYN referral (likely needs endometrial biopsy). Low suspicion for infection contributing.  - US Pelvic Complete with Transvaginal; Future  - Ob/Gyn Referral; Future  - Wet prep - lab collect; Future  - Wet prep - lab collect    Patient Instructions   Please call to schedule your radiology appointment at [facility]: # [phone], Try to get this done before you see OB-GYN    You can call OB-GYN directly at [phone] to schedule an appointment. I would like you to be seen this week or next week.    If you develop increased pain or heavy bleeding or fevers you should be seen right away.          Return in about 1 week (around 7/20/2022) for Routine Visit.    Renee Aldana NP  [facility] RAY Schmitt is a 66 year old, presenting for the following health issues:  Vaginal Problem      Vaginal Problem     History of Present Illness       Reason for visit:  Spotting and very mild cramping as if I am ovulating...have already been through menopause...no periods in 12 years.  no previous problems with this  Symptom onset:  3-7 days ago  Symptoms include:  Spotting and mild cramping, like I am ovulating. (no periods in 12 years).  Symptom intensity:  Mild  Symptom progression:  Staying the same  Had these symptoms before:  No  What makes it worse:  No  What makes it better:  No    She eats 2-3 servings of fruits and vegetables daily.She consumes 0 sweetened beverage(s) daily. She exercises with enough effort to increase her heart rate 5 days per week.   She is taking medications regularly.     Initially noticed spotting (bloody) and lower pelvic cramping about 1.5 weeks ago  Has noticed a little bloody spotting every day since then  Pelvic cramping has since resolved but she does has some lower abdomen  "bloating  Denies fevers, heavy bleeding, lightheadedness, dizziness, pain with eating, vag itching, urinary symptoms.  No concern for STI  No recent trauma or intercourse  Hx of ovarian cyst in her 20s     Review of Systems   Genitourinary: Positive for vaginal discharge.         Objective    /78   Pulse 95   Temp 97.8  F (36.6  C)   Resp 14   Ht 1.612 m (5' 3.47\")   Wt 89.4 kg (197 lb)   LMP 05/19/2008   SpO2 96%   BMI 34.38 kg/m    Body mass index is 34.38 kg/m .  Physical Exam   GENERAL: healthy, alert and no distress  ABDOMEN: soft, nontender, no hepatosplenomegaly, no masses    No results found for this or any previous visit (from the past 24 hour(s)).                .  ..  "

## 2022-07-21 ENCOUNTER — TELEPHONE (OUTPATIENT)
Dept: PEDIATRICS | Facility: CLINIC | Age: 67
End: 2022-07-21

## 2022-07-21 ENCOUNTER — HOSPITAL ENCOUNTER (OUTPATIENT)
Dept: ULTRASOUND IMAGING | Facility: CLINIC | Age: 67
Discharge: HOME | End: 2022-07-21
Attending: NURSE PRACTITIONER | Admitting: NURSE PRACTITIONER
Payer: MEDICARE

## 2022-07-21 DIAGNOSIS — R14.0 ABDOMINAL BLOATING WITH CRAMPS: ICD-10-CM

## 2022-07-21 DIAGNOSIS — R10.2 PELVIC CRAMPING: ICD-10-CM

## 2022-07-21 DIAGNOSIS — N95.0 POSTMENOPAUSAL BLEEDING: ICD-10-CM

## 2022-07-21 DIAGNOSIS — R10.9 ABDOMINAL BLOATING WITH CRAMPS: ICD-10-CM

## 2022-07-21 LAB — RADIOLOGIST FLAGS: ABNORMAL

## 2022-07-21 PROCEDURE — 76830 TRANSVAGINAL US NON-OB: CPT

## 2022-07-26 ENCOUNTER — PREP FOR PROCEDURE (OUTPATIENT)
Dept: OBGYN | Facility: CLINIC | Age: 67
End: 2022-07-26

## 2022-07-26 ENCOUNTER — OFFICE VISIT (OUTPATIENT)
Dept: OBGYN | Facility: CLINIC | Age: 67
End: 2022-07-26
Payer: MEDICARE

## 2022-07-26 VITALS
SYSTOLIC BLOOD PRESSURE: 128 MMHG | BODY MASS INDEX: 33.19 KG/M2 | WEIGHT: 194.4 LBS | HEIGHT: 64 IN | DIASTOLIC BLOOD PRESSURE: 70 MMHG

## 2022-07-26 DIAGNOSIS — N88.2 CERVICAL OS STENOSIS: ICD-10-CM

## 2022-07-26 DIAGNOSIS — R93.89 THICKENED ENDOMETRIUM: ICD-10-CM

## 2022-07-26 DIAGNOSIS — N95.0 POSTMENOPAUSAL BLEEDING: Primary | ICD-10-CM

## 2022-07-26 PROCEDURE — 99215 OFFICE O/P EST HI 40 MIN: CPT | Mod: 25 | Performed by: OBSTETRICS & GYNECOLOGY

## 2022-07-26 PROCEDURE — 58100 BIOPSY OF UTERUS LINING: CPT | Mod: 53 | Performed by: OBSTETRICS & GYNECOLOGY

## 2022-07-26 RX ORDER — MISOPROSTOL 200 UG/1
800 TABLET ORAL ONCE
Qty: 4 TABLET | Refills: 0 | Status: SHIPPED | OUTPATIENT
Start: 2022-07-26 | End: 2022-07-26

## 2022-07-26 NOTE — PROGRESS NOTES
Postmenopausal bleeding  Thickened endometrium     Patient seen in consultation at the request of Renee Aldana for postmenopausal bleeding and thickened endometrium seen on pelvic ultrasound.     Ms. Vanessa Mckeon 66 year old G0 presents for referral to address postmenopausal bleeding and thickened heterogenous endometrium seen pelvic ultrasound.     Postmenopausal bleeding episode occurred on July 4th. It lasted 3-4 days. She describes it as spotting. She denies having cramping during the bleeding episode. Has been postmenopausal for 12 years. Denies hx of hormonal replacement therapy.       Past Medical History:   Diagnosis Date     Anemia      Arthritis     Aleve occasionally     Benign neoplasm of colon      Cervical high risk HPV (human papillomavirus) test positive 10/02/2017 & 10/9/2018 & 10/24/2019    10/2/17 NIL, +HR HPV, not 16/18     Gastro-oesophageal reflux disease      History of blood transfusion     Iron deficiency     Hypertrophy of breast     fibrous left breast- benign     Malignant neoplasm (H)      Mild dysplasia of cervix 12/3/2019     Pure hypercholesterolemia      Skin cancer      Thyroid disease     Levothyroxine       Past Surgical History:   Procedure Laterality Date     BIOPSY OF BREAST, INCISIONAL  2004, 2001    left breast     COLONOSCOPY  10/8/2011    Procedure:COMBINED COLONOSCOPY, SINGLE BIOPSY/POLYPECTOMY BY BIOPSY; COLONOSCOPY; Surgeon:DARIANA RUIZ; Location: GI     COLONOSCOPY N/A 10/13/2015    Procedure: COLONOSCOPY;  Surgeon: Toi Sotelo MD;  Location:  GI     COLONOSCOPY Left 7/13/2020    Procedure: COLONOSCOPY;  Surgeon: Toi Sotelo MD;  Location: [facility]     ESOPHAGOSCOPY, GASTROSCOPY, DUODENOSCOPY (EGD), COMBINED Left 7/13/2020    Procedure: ESOPHAGOGASTRODUODENOSCOPY, WITH BIOPSies using biopsy forcep;  Surgeon: Toi Sotelo MD;  Location:  GI     PHACOEMULSIFICATION CLEAR CORNEA WITH STANDARD INTRAOCULAR LENS IMPLANT  10/4/2012     Procedure: PHACOEMULSIFICATION CLEAR CORNEA WITH STANDARD INTRAOCULAR LENS IMPLANT;  RIGHT PHACOEMULSIFICATION CLEAR CORNEA WITH STANDARD INTRAOCULAR LENS IMPLANT;  Surgeon: Óscar Torrez MD;  Location: [facility]     PHACOEMULSIFICATION CLEAR CORNEA WITH STANDARD INTRAOCULAR LENS IMPLANT  2012    Procedure: PHACOEMULSIFICATION CLEAR CORNEA WITH STANDARD INTRAOCULAR LENS IMPLANT;  LEFT  PHACOEMULSIFICATION CLEAR CORNEA WITH STANDARD INTRAOCULAR LENS IMPLANT ;  Surgeon: Óscar Torrez MD;  Location: [facility]     SURGICAL HISTORY OF -       removal of skin cancer     Z EXPLORATORY OF ABDOMEN  1980    Laparotomy, thought she had ovarian cyst, but nothing was found       Current Outpatient Medications   Medication     fluticasone (FLONASE) 50 MCG/ACT nasal spray     Iron Combinations (IRON COMPLEX PO)     levothyroxine (SYNTHROID/LEVOTHROID) 88 MCG tablet     misoprostol (CYTOTEC) 200 MCG tablet     Multiple Vitamins-Minerals (MULTIVITAMIN OR)     No current facility-administered medications for this visit.       Allergies   Allergen Reactions     Flu Virus Vaccine      Hypertension,flushing  Other reaction(s): Hypertension  Hypertension,flushing       Social History     Tobacco Use     Smoking status: Former Smoker     Packs/day: 0.50     Years: 10.00     Pack years: 5.00     Types: Cigarettes     Quit date: 2003     Years since quittin.3     Smokeless tobacco: Never Used   Substance Use Topics     Alcohol use: Yes     Comment: very little     Drug use: No       Family History   Problem Relation Age of Onset     Diabetes Father         adult     Neurologic Disorder Father         parkinsons     Cancer Father 95        lung     Heart Disease Father      C.A.D. Father      Heart Disease Mother      Hypertension Mother      Breast Cancer Mother      Thyroid Disease Mother      Lung Cancer Mother      Cancer Mother      Thyroid Disease Sister      Thyroid Disease Sister      Heart Disease Paternal Uncle         " 2 uncles MI     Colon Cancer No family hx of        ROS: 10 point review of systems negative except for pertinent positives stated in the HPI      Exam:   /70   Ht 1.613 m (5' 3.5\")   Wt 88.2 kg (194 lb 6.4 oz)   LMP 05/19/2008   BMI 33.90 kg/m    General Appearance: Well nourished, well developed female,   Neurological: Mental Status Normal   HEET: Atraumatic, normocephalic  Pelvic: Normal external female genitalia with mild atrophy noted.  No external lesions, normal hair distribution, no adenopathy. Speculum exam reveals vaginal epithelium well rugated with no abnormal discharge. Cervix appears smooth, pink, with no visible lesions. Bimanual exam reveals small size uterus, anteverted, non-tender, and mobile. No adnexal masses or tenderness. No cervical motion tenderness.     Procedure: Endometrial biopsy  After informed consent was obtained from the patient, a speculum was placed in the vagina to visualize the cervix.  The cervix was then swabbed with a betadine prep.  Tenaculum was applied to the anterior cervical lip. Endometrial biopsy pipelle was attempted to pass through the cervical os but cervical os stenosis was encountered. After multiple attempts at dilation of the cervical os using cervical os finders, I was unable to get into the endometrial cavity. Therefore, the procedure was aborted. Tenaculum was removed and sites were hemostatic. There were no complications. The patient tolerated the procedure well with a minimal amount of cramping noted. No specimens were collected to be sent.     A/P: Postmenopausal bleeding, Thickened endometrium, Cervical os stenosis  -- reviewed the pathophysiology of postmenopausal bleeding, its causes and the importance of ruling out endometrial hyperplasia/malignancy  -- reviewed ultrasound findings; low suspicion for structural lesions in the endometrial cavity ie endometrial polyp, submucosal fibroids given absence of these findings on the ultrasound   -- due " to cervical os stenosis and inability to get endometrial sampling, I recommended proceeding with hysteroscopy, dilation and curettage under abdominal ultrasound guidance; I reviewed risks of the procedure including but not limited to bleeding, infection, need for blood transfusion, uterine perforation with injury to surrounding organs; patient conveyed understanding of the risks and agrees to proceed with procedure  -- I informed patient that I prescribed her misoprostol 800 mcg one time dose to be taken the night prior to the procedure to facilitate dilation of the cervical os in time for her procedure; patient conveys understanding and will take medication as instructed   -- bleeding precautions were reviewed    Total time spent was 60 minutes on the date of the encounter in chart review, patient visit, review of tests, documentation and counseling on the above medical conditions, not including time spent on the procedure.     Linda Schmitt MD  [facility]

## 2022-07-26 NOTE — NURSING NOTE
"Chief Complaint   Patient presents with     Abnormal Uterine Bleeding     Pelvic ultrasound on 22       Initial /70   Ht 1.613 m (5' 3.5\")   Wt 88.2 kg (194 lb 6.4 oz)   LMP 2008   BMI 33.90 kg/m   Estimated body mass index is 33.9 kg/m  as calculated from the following:    Height as of this encounter: 1.613 m (5' 3.5\").    Weight as of this encounter: 88.2 kg (194 lb 6.4 oz).  BP completed using cuff size: regular long    Questioned patient about current smoking habits.  Pt. quit smoking some time ago.          The following HM Due: NONE    "

## 2022-07-26 NOTE — H&P (VIEW-ONLY)
Postmenopausal bleeding  Thickened endometrium     Patient seen in consultation at the request of Renee Aldana for postmenopausal bleeding and thickened endometrium seen on pelvic ultrasound.     Ms. Vanessa Mckeon 66 year old G0 presents for referral to address postmenopausal bleeding and thickened heterogenous endometrium seen pelvic ultrasound.     Postmenopausal bleeding episode occurred on July 4th. It lasted 3-4 days. She describes it as spotting. She denies having cramping during the bleeding episode. Has been postmenopausal for 12 years. Denies hx of hormonal replacement therapy.       Past Medical History:   Diagnosis Date     Anemia      Arthritis     Aleve occasionally     Benign neoplasm of colon      Cervical high risk HPV (human papillomavirus) test positive 10/02/2017 & 10/9/2018 & 10/24/2019    10/2/17 NIL, +HR HPV, not 16/18     Gastro-oesophageal reflux disease      History of blood transfusion     Iron deficiency     Hypertrophy of breast     fibrous left breast- benign     Malignant neoplasm (H)      Mild dysplasia of cervix 12/3/2019     Pure hypercholesterolemia      Skin cancer      Thyroid disease     Levothyroxine       Past Surgical History:   Procedure Laterality Date     BIOPSY OF BREAST, INCISIONAL  2004, 2001    left breast     COLONOSCOPY  10/8/2011    Procedure:COMBINED COLONOSCOPY, SINGLE BIOPSY/POLYPECTOMY BY BIOPSY; COLONOSCOPY; Surgeon:DARIANA RUIZ; Location: GI     COLONOSCOPY N/A 10/13/2015    Procedure: COLONOSCOPY;  Surgeon: Toi Sotelo MD;  Location:  GI     COLONOSCOPY Left 7/13/2020    Procedure: COLONOSCOPY;  Surgeon: Toi Sotelo MD;  Location: [facility]     ESOPHAGOSCOPY, GASTROSCOPY, DUODENOSCOPY (EGD), COMBINED Left 7/13/2020    Procedure: ESOPHAGOGASTRODUODENOSCOPY, WITH BIOPSies using biopsy forcep;  Surgeon: Toi Sotelo MD;  Location:  GI     PHACOEMULSIFICATION CLEAR CORNEA WITH STANDARD INTRAOCULAR LENS IMPLANT  10/4/2012     Procedure: PHACOEMULSIFICATION CLEAR CORNEA WITH STANDARD INTRAOCULAR LENS IMPLANT;  RIGHT PHACOEMULSIFICATION CLEAR CORNEA WITH STANDARD INTRAOCULAR LENS IMPLANT;  Surgeon: Óscar Torrez MD;  Location: [facility]     PHACOEMULSIFICATION CLEAR CORNEA WITH STANDARD INTRAOCULAR LENS IMPLANT  2012    Procedure: PHACOEMULSIFICATION CLEAR CORNEA WITH STANDARD INTRAOCULAR LENS IMPLANT;  LEFT  PHACOEMULSIFICATION CLEAR CORNEA WITH STANDARD INTRAOCULAR LENS IMPLANT ;  Surgeon: Óscar Torrez MD;  Location: [facility]     SURGICAL HISTORY OF -       removal of skin cancer     Z EXPLORATORY OF ABDOMEN  1980    Laparotomy, thought she had ovarian cyst, but nothing was found       Current Outpatient Medications   Medication     fluticasone (FLONASE) 50 MCG/ACT nasal spray     Iron Combinations (IRON COMPLEX PO)     levothyroxine (SYNTHROID/LEVOTHROID) 88 MCG tablet     misoprostol (CYTOTEC) 200 MCG tablet     Multiple Vitamins-Minerals (MULTIVITAMIN OR)     No current facility-administered medications for this visit.       Allergies   Allergen Reactions     Flu Virus Vaccine      Hypertension,flushing  Other reaction(s): Hypertension  Hypertension,flushing       Social History     Tobacco Use     Smoking status: Former Smoker     Packs/day: 0.50     Years: 10.00     Pack years: 5.00     Types: Cigarettes     Quit date: 2003     Years since quittin.3     Smokeless tobacco: Never Used   Substance Use Topics     Alcohol use: Yes     Comment: very little     Drug use: No       Family History   Problem Relation Age of Onset     Diabetes Father         adult     Neurologic Disorder Father         parkinsons     Cancer Father 95        lung     Heart Disease Father      C.A.D. Father      Heart Disease Mother      Hypertension Mother      Breast Cancer Mother      Thyroid Disease Mother      Lung Cancer Mother      Cancer Mother      Thyroid Disease Sister      Thyroid Disease Sister      Heart Disease Paternal Uncle         " 2 uncles MI     Colon Cancer No family hx of        ROS: 10 point review of systems negative except for pertinent positives stated in the HPI      Exam:   /70   Ht 1.613 m (5' 3.5\")   Wt 88.2 kg (194 lb 6.4 oz)   LMP 05/19/2008   BMI 33.90 kg/m    General Appearance: Well nourished, well developed female,   Neurological: Mental Status Normal   HEET: Atraumatic, normocephalic  Pelvic: Normal external female genitalia with mild atrophy noted.  No external lesions, normal hair distribution, no adenopathy. Speculum exam reveals vaginal epithelium well rugated with no abnormal discharge. Cervix appears smooth, pink, with no visible lesions. Bimanual exam reveals small size uterus, anteverted, non-tender, and mobile. No adnexal masses or tenderness. No cervical motion tenderness.     Procedure: Endometrial biopsy  After informed consent was obtained from the patient, a speculum was placed in the vagina to visualize the cervix.  The cervix was then swabbed with a betadine prep.  Tenaculum was applied to the anterior cervical lip. Endometrial biopsy pipelle was attempted to pass through the cervical os but cervical os stenosis was encountered. After multiple attempts at dilation of the cervical os using cervical os finders, I was unable to get into the endometrial cavity. Therefore, the procedure was aborted. Tenaculum was removed and sites were hemostatic. There were no complications. The patient tolerated the procedure well with a minimal amount of cramping noted. No specimens were collected to be sent.     A/P: Postmenopausal bleeding, Thickened endometrium, Cervical os stenosis  -- reviewed the pathophysiology of postmenopausal bleeding, its causes and the importance of ruling out endometrial hyperplasia/malignancy  -- reviewed ultrasound findings; low suspicion for structural lesions in the endometrial cavity ie endometrial polyp, submucosal fibroids given absence of these findings on the ultrasound   -- due " to cervical os stenosis and inability to get endometrial sampling, I recommended proceeding with hysteroscopy, dilation and curettage under abdominal ultrasound guidance; I reviewed risks of the procedure including but not limited to bleeding, infection, need for blood transfusion, uterine perforation with injury to surrounding organs; patient conveyed understanding of the risks and agrees to proceed with procedure  -- I informed patient that I prescribed her misoprostol 800 mcg one time dose to be taken the night prior to the procedure to facilitate dilation of the cervical os in time for her procedure; patient conveys understanding and will take medication as instructed   -- bleeding precautions were reviewed    Total time spent was 60 minutes on the date of the encounter in chart review, patient visit, review of tests, documentation and counseling on the above medical conditions, not including time spent on the procedure.     Linda Schmitt MD  [facility]

## 2022-07-27 ENCOUNTER — TELEPHONE (OUTPATIENT)
Dept: OBGYN | Facility: CLINIC | Age: 67
End: 2022-07-27

## 2022-07-27 NOTE — TELEPHONE ENCOUNTER
Patient Name: Vanessa Mckeon   MRN: [id number]   Case#: [id number]   Surgeon(s) and Role:      * Linda Schmitt MD - Primary   Date requested: * No dates entered *   Location:  OR   Procedure(s):   Hystersocopy, dilation and curettage under abdominal ultrasound guidance. (N/A)

## 2022-07-28 NOTE — TELEPHONE ENCOUNTER
Type of surgery: hysteroscopy, dilation and curettage under abdominal ultrasound guidance  Location of surgery: [facility]  Date and time of surgery: 8/9/22 @ 9:20 am  Surgeon: Dr. Mckeon  Pre-Op Appt Date: Patient advised to schedule.  Post-Op Appt Date:    Packet sent out: Yes  Pre-cert/Authorization completed:  No  Date: 7/28/22

## 2022-08-01 RX ORDER — MISOPROSTOL 200 UG/1
TABLET ORAL
COMMUNITY
Start: 2022-07-26 | End: 2022-10-18

## 2022-08-04 ENCOUNTER — OFFICE VISIT (OUTPATIENT)
Dept: PEDIATRICS | Facility: CLINIC | Age: 67
End: 2022-08-04
Payer: MEDICARE

## 2022-08-04 VITALS
DIASTOLIC BLOOD PRESSURE: 80 MMHG | BODY MASS INDEX: 33.87 KG/M2 | TEMPERATURE: 97.7 F | HEART RATE: 91 BPM | SYSTOLIC BLOOD PRESSURE: 122 MMHG | WEIGHT: 198.4 LBS | HEIGHT: 64 IN | RESPIRATION RATE: 16 BRPM | OXYGEN SATURATION: 95 %

## 2022-08-04 DIAGNOSIS — N93.9 ABNORMAL UTERINE BLEEDING: ICD-10-CM

## 2022-08-04 DIAGNOSIS — Z01.818 PREOP GENERAL PHYSICAL EXAM: Primary | ICD-10-CM

## 2022-08-04 DIAGNOSIS — N85.00 ENDOMETRIAL HYPERPLASIA: ICD-10-CM

## 2022-08-04 PROCEDURE — 99214 OFFICE O/P EST MOD 30 MIN: CPT | Mod: GC

## 2022-08-04 ASSESSMENT — PAIN SCALES - GENERAL: PAINLEVEL: NO PAIN (0)

## 2022-08-04 NOTE — PROGRESS NOTES
[facility]  [address]  [address]  [place] [address]  Phone: [phone]  Fax: [phone]  Primary Provider: Tatiana Leahy  Pre-op Performing Provider: HARRISON WAYNE    PREOPERATIVE EVALUATION:  Today's date: 8/4/2022    Vanessa Mckeon is a 66 year old female who presents for a preoperative evaluation.    Surgical Information:  Surgery/Procedure: Hystersocopy, dilation and curettage under abdominal ultrasound guidance.  Surgery Location: [facility]   Surgeon: Linda Schmitt MD  Surgery Date: 8/9/22  Time of Surgery: 9:20   Where patient plans to recover: At home with family  Fax number for surgical facility: Note does not need to be faxed, will be available electronically in Epic.    Type of Anesthesia Anticipated: CRNA     Assessment & Plan   The proposed surgical procedure is considered LOW risk.    Preop general physical exam  Endometrial hyperplasia with abnormal uterine bleeding  Patient will undergo uterine D&C for thickened endometrium and abnormal uterine bleeding next week. She is approved for this surgery and does not need any further evaluation. No changes to medications in preparation for surgery.     Risks and Recommendations:  The patient has the following additional risks and recommendations for perioperative complications:   - No identified additional risk factors other than previously addressed    Medication Instructions:  Patient is to take all scheduled medications on the day of surgery    RECOMMENDATION:  APPROVAL GIVEN to proceed with proposed procedure, without further diagnostic evaluation.      Subjective     HPI related to upcoming procedure:   Menopause occurred about 12 years ago. Vaginal spotting started 7/4, very light but has continued almost every day. No hormonal replacement therapy. Went to see PCP and then saw gynecology. Pelvic ultrasound done showing thickened endometrium 16mm, concerning for neoplasm. With  exertion/heavy lifting gets slight pain in the lower abdomen, otherwise no discomfort. No blood in stool or urine, no dysuria, no other abnormal vaginal discharge or odor. Wet prep with PCP was negative aside from 2+ WBC on 7/13/22. Does have history of mild dysplasia of cervix and high risk cervical HPV.     Preop Questions 7/29/2022   1. Have you ever had a heart attack or stroke? No   2. Have you ever had surgery on your heart or blood vessels, such as a stent placement, a coronary artery bypass, or surgery on an artery in your head, neck, heart, or legs? No   3. Do you have chest pain with activity? No   4. Do you have a history of  heart failure? No   5. Do you currently have a cold, bronchitis or symptoms of other infection? No   6. Do you have a cough, shortness of breath, or wheezing? No   7. Do you or anyone in your family have previous history of blood clots? No   8. Do you or does anyone in your family have a serious bleeding problem such as prolonged bleeding following surgeries or cuts? No   9. Have you ever had problems with anemia or been told to take iron pills? YES - had SILVIA and needed iron infusions.   10. Have you had any abnormal blood loss such as black, tarry or bloody stools, or abnormal vaginal bleeding? No   11. Have you ever had a blood transfusion? No   12. Are you willing to have a blood transfusion if it is medically needed before, during, or after your surgery? Yes   13. Have you or any of your relatives ever had problems with anesthesia? No   14. Do you have sleep apnea, excessive snoring or daytime drowsiness? No   15. Do you have any artifical heart valves or other implanted medical devices like a pacemaker, defibrillator, or continuous glucose monitor? No   16. Do you have artificial joints? No   17. Are you allergic to latex? No       Health Care Directive:  Patient has a Health Care Directive on file      Preoperative Review of :   reviewed - no record of controlled  substances prescribed.    Status of Chronic Conditions:  HYPERLIPIDEMIA - Patient has a long history of significant hyperlipidemia not currently taking medication for treatment.    HYPOTHYROIDISM - Patient has a longstanding history of chronic hypothyroidism. Patient has been doing well, noting no new symptoms. Continues to take medications as directed, without adverse reactions or side effects. Last TSH 3.66.  Lab Results   Component Value Date    TSH 3.66 11/22/2021         Review of Systems  Constitutional, neuro, ENT, endocrine, pulmonary, cardiac, gastrointestinal, genitourinary, musculoskeletal, integument and psychiatric systems are negative, except as otherwise noted.    Patient Active Problem List    Diagnosis Date Noted     Mild dysplasia of cervix 12/03/2019     Priority: Medium     10/2/17 NIL, +HR HPV, not 16/18. Plan 1 yr co-test  10/9/18 NIL, +HR HPV, not 16/18. Plan colp  10/26/18 Dewy Rose EMB--VIK 1. Plan: co-test in 1 year.   10/24/19 NIL, +HR HPV, not 16/18. Plan Dewy Rose  12/3/19 Dewy Rose ECC: VIK 1, Bx & endocervical polyp: benign. Plan 1 year cotest  10/26/20 ASCUS, +HR HPV, not 16/18. Plan 1 yr co-test  11/22/21 ASCUS, Neg HPV. Plan 1 yr co-test       Class 1 obesity due to excess calories without serious comorbidity with body mass index (BMI) of 33.0 to 33.9 in adult 10/09/2018     Priority: Medium     Iron deficiency anemia, unspecified iron deficiency anemia type 10/02/2017     Priority: Medium     Gastroesophageal reflux disease without esophagitis 10/02/2017     Priority: Medium     Cervical high risk HPV (human papillomavirus) test positive 10/02/2017     Priority: Medium              Hypothyroidism, unspecified type 10/14/2016     Priority: Medium     Plantar fasciitis, bilateral 05/15/2015     Priority: Medium     Hiatal hernia 01/13/2015     Priority: Medium     Lumbago 08/08/2012     Priority: Medium     HYPERLIPIDEMIA LDL GOAL <160 10/31/2010     Priority: Medium     COLON POLYPS      Priority:  Medium      Past Medical History:   Diagnosis Date     Anemia      Arthritis     Aleve occasionally     Benign neoplasm of colon      Cervical high risk HPV (human papillomavirus) test positive 10/02/2017 & 10/9/2018 & 10/24/2019    10/2/17 NIL, +HR HPV, not 16/18     Gastro-oesophageal reflux disease      History of blood transfusion     Iron deficiency     Hypertrophy of breast     fibrous left breast- benign     Malignant neoplasm (H)      Mild dysplasia of cervix 12/03/2019     Pure hypercholesterolemia      Skin cancer      Thyroid disease     Levothyroxine     Past Surgical History:   Procedure Laterality Date     BIOPSY OF BREAST, INCISIONAL  2004, 2001    left breast     COLONOSCOPY  10/8/2011    Procedure:COMBINED COLONOSCOPY, SINGLE BIOPSY/POLYPECTOMY BY BIOPSY; COLONOSCOPY; Surgeon:DARIANA RUIZ; Location: GI     COLONOSCOPY N/A 10/13/2015    Procedure: COLONOSCOPY;  Surgeon: Toi Sotelo MD;  Location:  GI     COLONOSCOPY Left 7/13/2020    Procedure: COLONOSCOPY;  Surgeon: Toi Sotelo MD;  Location:  GI     ESOPHAGOSCOPY, GASTROSCOPY, DUODENOSCOPY (EGD), COMBINED Left 7/13/2020    Procedure: ESOPHAGOGASTRODUODENOSCOPY, WITH BIOPSies using biopsy forcep;  Surgeon: Toi Sotelo MD;  Location:  GI     PHACOEMULSIFICATION CLEAR CORNEA WITH STANDARD INTRAOCULAR LENS IMPLANT  10/4/2012    Procedure: PHACOEMULSIFICATION CLEAR CORNEA WITH STANDARD INTRAOCULAR LENS IMPLANT;  RIGHT PHACOEMULSIFICATION CLEAR CORNEA WITH STANDARD INTRAOCULAR LENS IMPLANT;  Surgeon: Óscar Torrez MD;  Location: [facility]     PHACOEMULSIFICATION CLEAR CORNEA WITH STANDARD INTRAOCULAR LENS IMPLANT  11/1/2012    Procedure: PHACOEMULSIFICATION CLEAR CORNEA WITH STANDARD INTRAOCULAR LENS IMPLANT;  LEFT  PHACOEMULSIFICATION CLEAR CORNEA WITH STANDARD INTRAOCULAR LENS IMPLANT ;  Surgeon: Óscar Torrez MD;  Location: [facility]     SURGICAL HISTORY OF -       removal of skin cancer     ZZC EXPLORATORY OF ABDOMEN   "    Laparotomy, thought she had ovarian cyst, but nothing was found     Current Outpatient Medications   Medication Sig Dispense Refill     fluticasone (FLONASE) 50 MCG/ACT nasal spray Spray 2 sprays into both nostrils daily 1 Package 2     Iron Combinations (IRON COMPLEX PO) Take by mouth daily       levothyroxine (SYNTHROID/LEVOTHROID) 88 MCG tablet Take 1 tablet (88 mcg) by mouth daily 90 tablet prn     misoprostol (CYTOTEC) 200 MCG tablet        Multiple Vitamins-Minerals (MULTIVITAMIN OR) Take  by mouth.         Allergies   Allergen Reactions     Flu Virus Vaccine      Hypertension,flushing  Other reaction(s): Hypertension  Hypertension,flushing        Social History     Tobacco Use     Smoking status: Former Smoker     Packs/day: 0.50     Years: 10.00     Pack years: 5.00     Types: Cigarettes     Quit date: 2003     Years since quittin.3     Smokeless tobacco: Never Used   Substance Use Topics     Alcohol use: Yes     Comment: very little     Family History   Problem Relation Age of Onset     Diabetes Father         adult     Neurologic Disorder Father         parkinsons     Cancer Father 95        lung     Heart Disease Father      C.A.D. Father      Heart Disease Mother      Hypertension Mother      Breast Cancer Mother      Thyroid Disease Mother      Lung Cancer Mother      Cancer Mother      Thyroid Disease Sister      Thyroid Disease Sister      Heart Disease Paternal Uncle         2 uncles MI     Colon Cancer No family hx of      History   Drug Use No         Objective     /80   Pulse 91   Temp 97.7  F (36.5  C) (Tympanic)   Resp 16   Ht 1.613 m (5' 3.5\")   Wt 90 kg (198 lb 6.4 oz)   LMP 2008   SpO2 95%   BMI 34.59 kg/m      Physical Exam    GENERAL APPEARANCE: healthy, alert and no distress     EYES: EOMI, PERRL     HENT: nose and mouth without ulcers or lesions     NECK: no adenopathy, no asymmetry, masses, or scars and thyroid normal to palpation     RESP: lungs " clear to auscultation - no rales, rhonchi or wheezes, comfortable on room air.     CV: regular rate and rhythm, normal S1 S2, no S3 or S4 and no murmur, click or rub. Normal radial and DP pulses.     ABDOMEN:  soft, nontender, no palpable masses and bowel sounds normal     MS: extremities normal- no gross deformities noted, no evidence of inflammation in joints, FROM in all extremities.     SKIN: no suspicious lesions or rashes     NEURO: Normal strength and tone, sensory exam grossly normal, mentation intact and speech normal     PSYCH: mentation appears normal and affect normal/bright    Diagnostics:  Last creatinine 2016: 0.72  Last hgb 2019: 16.1    Revised Cardiac Risk Index (RCRI):  The patient has the following serious cardiovascular risks for perioperative complications:   - No serious cardiac risks = 0 points     RCRI Interpretation: 0 points: Class I (very low risk - 0.4% complication rate)         Signed Electronically by: Jocelin Boyd MD  Copy of this evaluation report is provided to requesting physician.

## 2022-08-04 NOTE — PATIENT INSTRUCTIONS
Preparing for Your Surgery  Getting started  A nurse will call you to review your health history and instructions. They will give you an arrival time based on your scheduled surgery time. Please be ready to share:    Your doctor's clinic name and phone number    Your medical, surgical and anesthesia history    A list of allergies and sensitivities    A list of medicines, including herbal treatments and over-the-counter drugs    Whether the patient has a legal guardian (ask how to send us the papers in advance)  Please tell us if you're pregnant--or if there's any chance you might be pregnant. Some surgeries may injure a fetus (unborn baby), so they require a pregnancy test. Surgeries that are safe for a fetus don't always need a test, and you can choose whether to have one.   If you have a child who's having surgery, please ask for a copy of Preparing for Your Child's Surgery.    Preparing for surgery    Within 30 days of surgery: Have a pre-op exam (sometimes called an H&P, or History and Physical). This can be done at a clinic or pre-operative center.  ? If you're having a , you may not need this exam. Talk to your care team.    At your pre-op exam, talk to your care team about all medicines you take. If you need to stop any medicines before surgery, ask when to start taking them again.  ? We do this for your safety. Many medicines can make you bleed too much during surgery. Some change how well surgery (anesthesia) drugs work.    Call your insurance company to let them know you're having surgery. (If you don't have insurance, call [phone].)    Call your clinic if there's any change in your health. This includes signs of a cold or flu (sore throat, runny nose, cough, rash, fever). It also includes a scrape or scratch near the surgery site.    If you have questions on the day of surgery, call your hospital or surgery center.  COVID testing  You may need to be tested for COVID-19 before having  surgery. If so, we will give you instructions.  Eating and drinking guidelines  For your safety: Unless your surgeon tells you otherwise, follow the guidelines below.    Eat and drink as usual until 8 hours before surgery. After that, no food or milk.    Drink clear liquids until 2 hours before surgery. These are liquids you can see through, like water, Gatorade and Propel Water. You may also have black coffee and tea (no cream or milk).    Nothing by mouth within 2 hours of surgery. This includes gum, candy and breath mints.    If you drink alcohol: Stop drinking it the night before surgery.    If your care team tells you to take medicine on the morning of surgery, it's okay to take it with a sip of water.  Preventing infection    Shower or bathe the night before and morning of your surgery. Follow the instructions your clinic gave you. (If no instructions, use regular soap.)    Don't shave or clip hair near your surgery site. We'll remove the hair if needed.    Don't smoke or vape the morning of surgery. You may chew nicotine gum up to 2 hours before surgery. A nicotine patch is okay.  ? Note: Some surgeries require you to completely quit smoking and nicotine. Check with your surgeon.    Your care team will make every effort to keep you safe from infection. We will:  ? Clean our hands often with soap and water (or an alcohol-based hand rub).  ? Clean the skin at your surgery site with a special soap that kills germs.  ? Give you a special gown to keep you warm. (Cold raises the risk of infection.)  ? Wear special hair covers, masks, gowns and gloves during surgery.  ? Give antibiotic medicine, if prescribed. Not all surgeries need antibiotics.  What to bring on the day of surgery    Photo ID and insurance card    Copy of your health care directive, if you have one    Glasses and hearing aides (bring cases)  ? You can't wear contacts during surgery    Inhaler and eye drops, if you use them (tell us about these when  you arrive)    CPAP machine or breathing device, if you use them    A few personal items, if spending the night    If you have . . .  ? A pacemaker, ICD (cardiac defibrillator) or other implant: Bring the ID card.  ? An implanted stimulator: Bring the remote control.  ? A legal guardian: Bring a copy of the certified (court-stamped) guardianship papers.  Please remove any jewelry, including body piercings. Leave jewelry and other valuables at home.  If you're going home the day of surgery    You must have a responsible adult drive you home. They should stay with you overnight as well.    If you don't have someone to stay with you, and you aren't safe to go home alone, we may keep you overnight. Insurance often won't pay for this.  After surgery  If it's hard to control your pain or you need more pain medicine, please call your surgeon's office.  Questions?   If you have any questions for your care team, list them here: _________________________________________________________________________________________________________________________________________________________________________ ____________________________________ ____________________________________ ____________________________________  For informational purposes only. Not to replace the advice of your health care provider. Copyright   2003, 2019 [facility]. All rights reserved. Clinically reviewed by Livia Wood MD. Imagiin. [id number] - REV 07/21.

## 2022-08-09 ENCOUNTER — ANESTHESIA EVENT (OUTPATIENT)
Dept: SURGERY | Facility: CLINIC | Age: 67
End: 2022-08-09
Payer: MEDICARE

## 2022-08-09 ENCOUNTER — ANESTHESIA (OUTPATIENT)
Dept: SURGERY | Facility: CLINIC | Age: 67
End: 2022-08-09
Payer: MEDICARE

## 2022-08-09 ENCOUNTER — HOSPITAL ENCOUNTER (OUTPATIENT)
Dept: ULTRASOUND IMAGING | Facility: CLINIC | Age: 67
Discharge: HOME | End: 2022-08-09
Attending: OBSTETRICS & GYNECOLOGY | Admitting: OBSTETRICS & GYNECOLOGY
Payer: MEDICARE

## 2022-08-09 ENCOUNTER — HOSPITAL ENCOUNTER (OUTPATIENT)
Facility: CLINIC | Age: 67
Discharge: HOME | End: 2022-08-09
Attending: OBSTETRICS & GYNECOLOGY | Admitting: OBSTETRICS & GYNECOLOGY
Payer: MEDICARE

## 2022-08-09 VITALS
HEART RATE: 80 BPM | OXYGEN SATURATION: 96 % | DIASTOLIC BLOOD PRESSURE: 83 MMHG | BODY MASS INDEX: 33.73 KG/M2 | HEIGHT: 64 IN | SYSTOLIC BLOOD PRESSURE: 122 MMHG | TEMPERATURE: 98 F | WEIGHT: 197.6 LBS | RESPIRATION RATE: 18 BRPM

## 2022-08-09 DIAGNOSIS — N95.0 POSTMENOPAUSAL BLEEDING: ICD-10-CM

## 2022-08-09 DIAGNOSIS — R93.89 THICKENED ENDOMETRIUM: ICD-10-CM

## 2022-08-09 DIAGNOSIS — N88.2 CERVICAL OS STENOSIS: ICD-10-CM

## 2022-08-09 DIAGNOSIS — Z98.890 STATUS POST HYSTEROSCOPIC POLYPECTOMY: Primary | ICD-10-CM

## 2022-08-09 DIAGNOSIS — Z98.890 S/P DILATION AND CURETTAGE: ICD-10-CM

## 2022-08-09 PROCEDURE — 250N000011 HC RX IP 250 OP 636: Performed by: NURSE ANESTHETIST, CERTIFIED REGISTERED

## 2022-08-09 PROCEDURE — 258N000003 HC RX IP 258 OP 636: Performed by: NURSE ANESTHETIST, CERTIFIED REGISTERED

## 2022-08-09 PROCEDURE — 370N000017 HC ANESTHESIA TECHNICAL FEE, PER MIN: Performed by: OBSTETRICS & GYNECOLOGY

## 2022-08-09 PROCEDURE — 360N000076 HC SURGERY LEVEL 3, PER MIN: Performed by: OBSTETRICS & GYNECOLOGY

## 2022-08-09 PROCEDURE — 272N000001 HC OR GENERAL SUPPLY STERILE: Performed by: OBSTETRICS & GYNECOLOGY

## 2022-08-09 PROCEDURE — 999N000063 US INTRAOPERATIVE: Mod: TC

## 2022-08-09 PROCEDURE — 250N000013 HC RX MED GY IP 250 OP 250 PS 637: Performed by: OBSTETRICS & GYNECOLOGY

## 2022-08-09 PROCEDURE — 250N000011 HC RX IP 250 OP 636: Performed by: OBSTETRICS & GYNECOLOGY

## 2022-08-09 PROCEDURE — 258N000003 HC RX IP 258 OP 636: Performed by: ANESTHESIOLOGY

## 2022-08-09 PROCEDURE — 58558 HYSTEROSCOPY BIOPSY: CPT | Performed by: OBSTETRICS & GYNECOLOGY

## 2022-08-09 PROCEDURE — 710N000012 HC RECOVERY PHASE 2, PER MINUTE: Performed by: OBSTETRICS & GYNECOLOGY

## 2022-08-09 PROCEDURE — 88305 TISSUE EXAM BY PATHOLOGIST: CPT | Mod: TC | Performed by: OBSTETRICS & GYNECOLOGY

## 2022-08-09 PROCEDURE — 999N000141 HC STATISTIC PRE-PROCEDURE NURSING ASSESSMENT: Performed by: OBSTETRICS & GYNECOLOGY

## 2022-08-09 RX ORDER — SODIUM CHLORIDE, SODIUM LACTATE, POTASSIUM CHLORIDE, CALCIUM CHLORIDE 600; 310; 30; 20 MG/100ML; MG/100ML; MG/100ML; MG/100ML
500 INJECTION, SOLUTION INTRAVENOUS CONTINUOUS
Status: DISCONTINUED | OUTPATIENT
Start: 2022-08-09 | End: 2022-08-09 | Stop reason: HOSPADM

## 2022-08-09 RX ORDER — NALOXONE HYDROCHLORIDE 0.4 MG/ML
0.4 INJECTION, SOLUTION INTRAMUSCULAR; INTRAVENOUS; SUBCUTANEOUS
Status: DISCONTINUED | OUTPATIENT
Start: 2022-08-09 | End: 2022-08-09 | Stop reason: HOSPADM

## 2022-08-09 RX ORDER — AMOXICILLIN 250 MG
1-2 CAPSULE ORAL 2 TIMES DAILY
Qty: 30 TABLET | Refills: 0 | Status: SHIPPED | OUTPATIENT
Start: 2022-08-09 | End: 2022-10-18

## 2022-08-09 RX ORDER — NALOXONE HYDROCHLORIDE 0.4 MG/ML
0.2 INJECTION, SOLUTION INTRAMUSCULAR; INTRAVENOUS; SUBCUTANEOUS
Status: DISCONTINUED | OUTPATIENT
Start: 2022-08-09 | End: 2022-08-09 | Stop reason: HOSPADM

## 2022-08-09 RX ORDER — CEFAZOLIN SODIUM/WATER 2 G/20 ML
2 SYRINGE (ML) INTRAVENOUS
Status: COMPLETED | OUTPATIENT
Start: 2022-08-09 | End: 2022-08-09

## 2022-08-09 RX ORDER — OXYCODONE HYDROCHLORIDE 5 MG/1
5 TABLET ORAL EVERY 4 HOURS PRN
Status: DISCONTINUED | OUTPATIENT
Start: 2022-08-09 | End: 2022-08-09 | Stop reason: HOSPADM

## 2022-08-09 RX ORDER — IBUPROFEN 600 MG/1
600 TABLET, FILM COATED ORAL EVERY 6 HOURS PRN
Status: DISCONTINUED | OUTPATIENT
Start: 2022-08-09 | End: 2022-08-09 | Stop reason: HOSPADM

## 2022-08-09 RX ORDER — FENTANYL CITRATE 50 UG/ML
INJECTION, SOLUTION INTRAMUSCULAR; INTRAVENOUS PRN
Status: DISCONTINUED | OUTPATIENT
Start: 2022-08-09 | End: 2022-08-09

## 2022-08-09 RX ORDER — DEXAMETHASONE SODIUM PHOSPHATE 4 MG/ML
INJECTION, SOLUTION INTRA-ARTICULAR; INTRALESIONAL; INTRAMUSCULAR; INTRAVENOUS; SOFT TISSUE PRN
Status: DISCONTINUED | OUTPATIENT
Start: 2022-08-09 | End: 2022-08-09

## 2022-08-09 RX ORDER — IBUPROFEN 600 MG/1
600 TABLET, FILM COATED ORAL ONCE
Status: DISCONTINUED | OUTPATIENT
Start: 2022-08-09 | End: 2022-08-09 | Stop reason: HOSPADM

## 2022-08-09 RX ORDER — HYDROMORPHONE HCL IN WATER/PF 6 MG/30 ML
0.4 PATIENT CONTROLLED ANALGESIA SYRINGE INTRAVENOUS EVERY 5 MIN PRN
Status: DISCONTINUED | OUTPATIENT
Start: 2022-08-09 | End: 2022-08-09 | Stop reason: HOSPADM

## 2022-08-09 RX ORDER — ONDANSETRON 4 MG/1
4 TABLET, ORALLY DISINTEGRATING ORAL EVERY 8 HOURS PRN
Qty: 4 TABLET | Refills: 0 | Status: SHIPPED | OUTPATIENT
Start: 2022-08-09 | End: 2022-10-18

## 2022-08-09 RX ORDER — ALBUTEROL SULFATE 0.83 MG/ML
2.5 SOLUTION RESPIRATORY (INHALATION) EVERY 4 HOURS PRN
Status: DISCONTINUED | OUTPATIENT
Start: 2022-08-09 | End: 2022-08-09 | Stop reason: HOSPADM

## 2022-08-09 RX ORDER — PROPOFOL 10 MG/ML
INJECTION, EMULSION INTRAVENOUS CONTINUOUS PRN
Status: DISCONTINUED | OUTPATIENT
Start: 2022-08-09 | End: 2022-08-09

## 2022-08-09 RX ORDER — BUPIVACAINE HYDROCHLORIDE 2.5 MG/ML
INJECTION, SOLUTION INFILTRATION; PERINEURAL PRN
Status: DISCONTINUED | OUTPATIENT
Start: 2022-08-09 | End: 2022-08-09 | Stop reason: HOSPADM

## 2022-08-09 RX ORDER — ONDANSETRON 2 MG/ML
INJECTION INTRAMUSCULAR; INTRAVENOUS PRN
Status: DISCONTINUED | OUTPATIENT
Start: 2022-08-09 | End: 2022-08-09

## 2022-08-09 RX ORDER — IBUPROFEN 600 MG/1
600 TABLET, FILM COATED ORAL EVERY 6 HOURS PRN
Qty: 30 TABLET | Refills: 0 | Status: SHIPPED | OUTPATIENT
Start: 2022-08-09 | End: 2022-10-18

## 2022-08-09 RX ORDER — SODIUM CHLORIDE, SODIUM LACTATE, POTASSIUM CHLORIDE, CALCIUM CHLORIDE 600; 310; 30; 20 MG/100ML; MG/100ML; MG/100ML; MG/100ML
INJECTION, SOLUTION INTRAVENOUS CONTINUOUS PRN
Status: DISCONTINUED | OUTPATIENT
Start: 2022-08-09 | End: 2022-08-09

## 2022-08-09 RX ORDER — FENTANYL CITRATE 50 UG/ML
50 INJECTION, SOLUTION INTRAMUSCULAR; INTRAVENOUS EVERY 5 MIN PRN
Status: DISCONTINUED | OUTPATIENT
Start: 2022-08-09 | End: 2022-08-09 | Stop reason: HOSPADM

## 2022-08-09 RX ORDER — OXYCODONE HYDROCHLORIDE 5 MG/1
5 TABLET ORAL
Status: DISCONTINUED | OUTPATIENT
Start: 2022-08-09 | End: 2022-08-09 | Stop reason: HOSPADM

## 2022-08-09 RX ORDER — CEFAZOLIN SODIUM/WATER 2 G/20 ML
2 SYRINGE (ML) INTRAVENOUS SEE ADMIN INSTRUCTIONS
Status: DISCONTINUED | OUTPATIENT
Start: 2022-08-09 | End: 2022-08-09 | Stop reason: HOSPADM

## 2022-08-09 RX ORDER — HYDRALAZINE HYDROCHLORIDE 20 MG/ML
2.5-5 INJECTION INTRAMUSCULAR; INTRAVENOUS EVERY 10 MIN PRN
Status: DISCONTINUED | OUTPATIENT
Start: 2022-08-09 | End: 2022-08-09 | Stop reason: HOSPADM

## 2022-08-09 RX ORDER — ONDANSETRON 2 MG/ML
4 INJECTION INTRAMUSCULAR; INTRAVENOUS EVERY 30 MIN PRN
Status: DISCONTINUED | OUTPATIENT
Start: 2022-08-09 | End: 2022-08-09 | Stop reason: HOSPADM

## 2022-08-09 RX ORDER — FENTANYL CITRATE 50 UG/ML
50 INJECTION, SOLUTION INTRAMUSCULAR; INTRAVENOUS
Status: DISCONTINUED | OUTPATIENT
Start: 2022-08-09 | End: 2022-08-09 | Stop reason: HOSPADM

## 2022-08-09 RX ORDER — ACETAMINOPHEN 325 MG/1
975 TABLET ORAL ONCE
Status: DISCONTINUED | OUTPATIENT
Start: 2022-08-09 | End: 2022-08-09

## 2022-08-09 RX ORDER — ONDANSETRON 4 MG/1
4 TABLET, ORALLY DISINTEGRATING ORAL EVERY 30 MIN PRN
Status: DISCONTINUED | OUTPATIENT
Start: 2022-08-09 | End: 2022-08-09 | Stop reason: HOSPADM

## 2022-08-09 RX ORDER — LIDOCAINE 40 MG/G
CREAM TOPICAL
Status: DISCONTINUED | OUTPATIENT
Start: 2022-08-09 | End: 2022-08-09 | Stop reason: HOSPADM

## 2022-08-09 RX ORDER — SODIUM CHLORIDE, SODIUM LACTATE, POTASSIUM CHLORIDE, CALCIUM CHLORIDE 600; 310; 30; 20 MG/100ML; MG/100ML; MG/100ML; MG/100ML
INJECTION, SOLUTION INTRAVENOUS CONTINUOUS
Status: DISCONTINUED | OUTPATIENT
Start: 2022-08-09 | End: 2022-08-09 | Stop reason: HOSPADM

## 2022-08-09 RX ORDER — LABETALOL HYDROCHLORIDE 5 MG/ML
10 INJECTION, SOLUTION INTRAVENOUS
Status: DISCONTINUED | OUTPATIENT
Start: 2022-08-09 | End: 2022-08-09 | Stop reason: HOSPADM

## 2022-08-09 RX ORDER — ACETAMINOPHEN 325 MG/1
975 TABLET ORAL EVERY 6 HOURS PRN
Qty: 50 TABLET | Refills: 0 | Status: SHIPPED | OUTPATIENT
Start: 2022-08-09 | End: 2022-10-18

## 2022-08-09 RX ORDER — ACETAMINOPHEN 325 MG/1
975 TABLET ORAL ONCE
Status: COMPLETED | OUTPATIENT
Start: 2022-08-09 | End: 2022-08-09

## 2022-08-09 RX ORDER — KETOROLAC TROMETHAMINE 30 MG/ML
INJECTION, SOLUTION INTRAMUSCULAR; INTRAVENOUS PRN
Status: DISCONTINUED | OUTPATIENT
Start: 2022-08-09 | End: 2022-08-09

## 2022-08-09 RX ADMIN — SODIUM CHLORIDE, POTASSIUM CHLORIDE, SODIUM LACTATE AND CALCIUM CHLORIDE: 600; 310; 30; 20 INJECTION, SOLUTION INTRAVENOUS at 12:25

## 2022-08-09 RX ADMIN — PROPOFOL 200 MCG/KG/MIN: 10 INJECTION, EMULSION INTRAVENOUS at 10:40

## 2022-08-09 RX ADMIN — ONDANSETRON HYDROCHLORIDE 4 MG: 2 INJECTION, SOLUTION INTRAVENOUS at 10:42

## 2022-08-09 RX ADMIN — Medication 2 G: at 10:36

## 2022-08-09 RX ADMIN — SODIUM CHLORIDE, POTASSIUM CHLORIDE, SODIUM LACTATE AND CALCIUM CHLORIDE: 600; 310; 30; 20 INJECTION, SOLUTION INTRAVENOUS at 10:36

## 2022-08-09 RX ADMIN — KETOROLAC TROMETHAMINE 15 MG: 30 INJECTION, SOLUTION INTRAMUSCULAR at 11:22

## 2022-08-09 RX ADMIN — DEXAMETHASONE SODIUM PHOSPHATE 4 MG: 4 INJECTION, SOLUTION INTRA-ARTICULAR; INTRALESIONAL; INTRAMUSCULAR; INTRAVENOUS; SOFT TISSUE at 10:44

## 2022-08-09 RX ADMIN — FENTANYL CITRATE 25 MCG: 50 INJECTION, SOLUTION INTRAMUSCULAR; INTRAVENOUS at 10:54

## 2022-08-09 RX ADMIN — ACETAMINOPHEN 975 MG: 325 TABLET ORAL at 12:09

## 2022-08-09 RX ADMIN — MIDAZOLAM 2 MG: 1 INJECTION INTRAMUSCULAR; INTRAVENOUS at 10:36

## 2022-08-09 ASSESSMENT — ACTIVITIES OF DAILY LIVING (ADL)
ADLS_ACUITY_SCORE: 35

## 2022-08-09 NOTE — DISCHARGE INSTRUCTIONS
DILATION AND CURETTAGE    AFTER YOUR DILATION AND CURETTAGE  -  You can expect some cramping for a few hours after the D & C.  This can be controlled with an over-the-counter pain reliever.  -  You may have some light bleeding for a few weeks.  Use pads instead of tampons.  -  Take showers instead of baths for about a week.  Ask your healthcare provider if you should avoid exercising or having sex for a period of time.      WHEN TO CALL YOUR HEALTHCARE PROVIDER    -  Heavy bleeding (more than 1 pad an hour).  -  A fever of 100.4 F (38 C) or higher, or as directed by your healthcare provider.  -  Increasing belly pain, tenderness, or cramping.  -  Foul-smelling discharge.      DR. ASHISH FRANCISCO M.D.                 CLINIC PHONE NUMBER:  [phone]  [facility]      SED[place] ADULT DISCHARGE INSTRUCTIONS   SPECIAL PRECAUTIONS FOR 24 HOURS AFTER SURGERY    IT IS NOT UNUSUAL TO FEEL LIGHT-HEADED OR FAINT, UP TO 24 HOURS AFTER SURGERY OR WHILE TAKING PAIN MEDICATION.  IF YOU HAVE THESE SYMPTOMS; SIT FOR A FEW MINUTES BEFORE STANDING AND HAVE SOMEONE ASSIST YOU WHEN YOU GET UP TO WALK OR USE THE BATHROOM.    YOU SHOULD REST AND RELAX FOR THE NEXT 24 HOURS AND YOU MUST MAKE ARRANGEMENTS TO HAVE SOMEONE STAY WITH YOU FOR AT LEAST 24 HOURS AFTER YOUR DISCHARGE.  AVOID HAZARDOUS AND STRENUOUS ACTIVITIES.  DO NOT MAKE IMPORTANT DECISIONS FOR 24 HOURS.    DO NOT DRIVE ANY VEHICLE OR OPERATE MECHANICAL EQUIPMENT FOR 24 HOURS FOLLOWING THE END OF YOUR SURGERY.  EVEN THOUGH YOU MAY FEEL NORMAL, YOUR REACTIONS MAY BE AFFECTED BY THE MEDICATION YOU HAVE RECEIVED.    DO NOT DRINK ALCOHOLIC BEVERAGES FOR 24 HOURS FOLLOWING YOUR SURGERY.      DRINK CLEAR LIQUIDS (APPLE JUICE, GINGER ALE, 7-UP, BROTH, ETC.).  PROGRESS TO YOUR REGULAR DIET AS YOU FEEL ABLE.    YOU MAY HAVE A DRY MOUTH, A SORE THROAT, MUSCLES ACHES OR TROUBLE SLEEPING.  THESE SHOULD GO AWAY AFTER 24 HOURS.    CALL YOUR DOCTOR FOR ANY OF THE  FOLLOWING:  SIGNS OF INFECTION (FEVER, GROWING TENDERNESS AT THE SURGERY SITE, A LARGE AMOUNT OF DRAINAGE OR BLEEDING, SEVERE PAIN, FOUL-SMELLING DRAINAGE, REDNESS OR SWELLING.    IT HAS BEEN OVER 8 TO 10 HOURS SINCE SURGERY AND YOU ARE STILL NOT ABLE TO URINATE (PASS WATER).      You received Toradol, an IV form of Ibuprofen (Motrin) at 11:20am.  Do not take any Ibuprofen products until 5:20pm.  Maximum acetaminophen (Tylenol) dose from all sources should not exceed 4 grams (4000 mg) per day.

## 2022-08-09 NOTE — OP NOTE
[facility]  Full Operative Note    Patient Name:Vanessa Mckeon  MRN: [id number]  YOB: 1955  Surgery Date: 8/9/2022    Surgeon: Linda Schmitt MD    Pre-operative Diagnosis: 1) Postmenopausal bleeding 2) Thickened endometrial lining on pelvic ultrasound 3) Cervical os stenosis   Post-operative Diagnosis: 1) Postmenopausal bleeding 2) Thickened endometrial lining on pelvic ultrasound 3) Cervical os stenosis 4) Endometrial polyps (2)   Procedure: 1) Diagnostic hysteroscopy 2) Polypectomy 3) Dilation and curettage 4) Abdominal ultrasound guidance     Anesthesia: Monitored anesthesia care  FRA Score: 2    EBL: 10 mL   IV fluids: Refer to anesthesia records  Fluid Deficit: 190 mL of crystalloids  Urine Output: Not collected during the case    Complications: None  Specimen: 1) Endometrial curetting 2) Endometrial polyp  Drains: None    Findings: Exam under anesthesia revealed a mobile 8 week size uterus without masses. No adnexal masses palpated. Hysteroscopy revealed two endometrial polyps. There was one that was attached at the fundal lining and slightly to the right with a pedunculated polyp extending to the lower uterine segment, and the other polyp was attached to the left proximal endometrial lining extending into the cervical canal. Both polyps appears dark red and necrotic in appearance. The rest of the endometrial lining appeared normal. Tubal ostia were visualized bilaterally. No evidence of uterine perforation.     Indication: Ms. Vanessa Mckeon presented to clinic with hx of postmenopausal bleeding. Her pelvic ultrasound showed a thickened endometrium measuring 16 mm in length. An attempt at an endometrial biopsy was done but unsuccessful due to cervical os stenosis. It was recommended then to have her undergo hysteroscopy, dilation and curettage under abdominal ultrasound guidance. She was given PO misoprostol 800 mcg to be taken the night prior to her procedure.    Risks of the procedure were reviewed with patient including but not limited to bleeding, infection, need for blood transfusion, uterine perforation with injury to surrounding organs. Patient conveyed understanding of these risks and agreed to proceed. She signed the consent form.     Technique: The patient was taken to the operating room where she was placed in the dorsal lithotomy position with feet in yellow fin stirrups. The patient was placed under monitored care anesthesia. Patient safety time out was then performed. An exam under anesthesia was performed with the above noted findings. The patient was prepped and draped in the usual sterile fashion. A speculum was placed in the vagina and the cervix visualized. A single-toothed tenaculum was placed on the anterior lip of the cervix at 12 o'clock after injection of 1cc of local at that site. A paracevical block was performed with 4 cc of 0.25% marcaine without epinephrine each at 5 and 8 o'clock. The cervical os was carefully dilated to 7 mm with sequential dilators. The operating hysteroscope was introduced through the cervix into the uterine cavity. Examination of the uterine cavity demonstrated above findings. The remainder of the cavity was unremarkable. Pictures were taken. The hysteroscopic Myosure was used to remove the polyps with good success. Pictures were taken. The hysteroscope apparatus was removed and total of 190 mL fluid deficit of normal saline noted. A sharp curet was used to scrape the uterus which was gritty on all aspects and with minimal return of tissue. The curetings were sent to pathology. The tenaculum was removed from the cervix and good hemostasis was noted. The speculum was removed from the vagina.    Instrument counts were correct x2. The patient was awoken in the OR and transferred to the PACU in stable condition.    Linda Schmitt MD  [facility]

## 2022-08-09 NOTE — INTERVAL H&P NOTE
"I have reviewed the surgical (or preoperative) H&P that is linked to this encounter, and examined the patient. There are no significant changes    Clinical Conditions Present on Arrival:  Clinically Significant Risk Factors Present on Admission                   # Obesity: Estimated body mass index is 34.45 kg/m  as calculated from the following:    Height as of this encounter: 1.613 m (5' 3.5\").    Weight as of this encounter: 89.6 kg (197 lb 9.6 oz).       "

## 2022-08-09 NOTE — ANESTHESIA PREPROCEDURE EVALUATION
Anesthesia Pre-Procedure Evaluation    Patient: Vanessa Mckeon   MRN: [id number] : 1955        Procedure : Procedure(s):  Hystersocopy, dilation and curettage under abdominal ultrasound guidance.          Past Medical History:   Diagnosis Date     Anemia      Arthritis     Aleve occasionally     Benign neoplasm of colon      Cervical high risk HPV (human papillomavirus) test positive 10/02/2017 & 10/9/2018 & 10/24/2019    10/2/17 NIL, +HR HPV, not 16/18     Gastro-oesophageal reflux disease      History of blood transfusion     Iron deficiency     Hypertrophy of breast     fibrous left breast- benign     Malignant neoplasm (H)      Mild dysplasia of cervix 2019     Pure hypercholesterolemia      Skin cancer      Thyroid disease     Levothyroxine      Past Surgical History:   Procedure Laterality Date     BIOPSY OF BREAST, INCISIONAL  ,     left breast     COLONOSCOPY  10/8/2011    Procedure:COMBINED COLONOSCOPY, SINGLE BIOPSY/POLYPECTOMY BY BIOPSY; COLONOSCOPY; Surgeon:DARIANA RUIZ; Location: GI     COLONOSCOPY N/A 10/13/2015    Procedure: COLONOSCOPY;  Surgeon: Toi Sotelo MD;  Location:  GI     COLONOSCOPY Left 2020    Procedure: COLONOSCOPY;  Surgeon: Toi Sotelo MD;  Location:  GI     ESOPHAGOSCOPY, GASTROSCOPY, DUODENOSCOPY (EGD), COMBINED Left 2020    Procedure: ESOPHAGOGASTRODUODENOSCOPY, WITH BIOPSies using biopsy forcep;  Surgeon: Toi Sotelo MD;  Location:  GI     PHACOEMULSIFICATION CLEAR CORNEA WITH STANDARD INTRAOCULAR LENS IMPLANT  10/4/2012    Procedure: PHACOEMULSIFICATION CLEAR CORNEA WITH STANDARD INTRAOCULAR LENS IMPLANT;  RIGHT PHACOEMULSIFICATION CLEAR CORNEA WITH STANDARD INTRAOCULAR LENS IMPLANT;  Surgeon: Óscar Torrez MD;  Location:  [facility]     PHACOEMULSIFICATION CLEAR CORNEA WITH STANDARD INTRAOCULAR LENS IMPLANT  2012    Procedure: PHACOEMULSIFICATION CLEAR CORNEA WITH STANDARD INTRAOCULAR LENS IMPLANT;  LEFT   PHACOEMULSIFICATION CLEAR CORNEA WITH STANDARD INTRAOCULAR LENS IMPLANT ;  Surgeon: Óscar Torrez MD;  Location: [facility]     SURGICAL HISTORY OF -       removal of skin cancer     ZZC EXPLORATORY OF ABDOMEN  1980    Laparotomy, thought she had ovarian cyst, but nothing was found      Allergies   Allergen Reactions     Flu Virus Vaccine      Hypertension,flushing  Other reaction(s): Hypertension  Hypertension,flushing      Social History     Tobacco Use     Smoking status: Former Smoker     Packs/day: 0.50     Years: 10.00     Pack years: 5.00     Types: Cigarettes     Quit date: 2003     Years since quittin.3     Smokeless tobacco: Never Used   Substance Use Topics     Alcohol use: Yes     Comment: very little      Wt Readings from Last 1 Encounters:   22 89.6 kg (197 lb 9.6 oz)        Anesthesia Evaluation            ROS/MED HX  ENT/Pulmonary:  - neg pulmonary ROS     Neurologic:       Cardiovascular:  - neg cardiovascular ROS     METS/Exercise Tolerance:     Hematologic:       Musculoskeletal:       GI/Hepatic:     (+) GERD,     Renal/Genitourinary:       Endo:     (+) thyroid problem, hypothyroidism, Obesity,     Psychiatric/Substance Use:       Infectious Disease:       Malignancy:       Other:            Physical Exam    Airway        Mallampati: II   TM distance: > 3 FB   Neck ROM: full     Respiratory Devices and Support         Dental           Cardiovascular   cardiovascular exam normal          Pulmonary   pulmonary exam normal                OUTSIDE LABS:  CBC:   Lab Results   Component Value Date    WBC 5.8 2019    WBC 5.7 10/09/2018    HGB 16.1 (H) 2019    HGB 15.2 10/09/2018    HCT 49.2 (H) 2019    HCT 46.6 10/09/2018     2019     10/09/2018     BMP:   Lab Results   Component Value Date     11/10/2016     2016    POTASSIUM 4.6 11/10/2016    POTASSIUM 4.0 2016    CHLORIDE 109 11/10/2016    CHLORIDE 108 2016    CO2 21  11/10/2016    CO2 23 09/27/2016    BUN 18 11/10/2016    BUN 16 09/27/2016    CR 0.72 11/10/2016    CR 0.68 09/27/2016    GLC 94 10/19/2020    GLC 83 11/10/2016     COAGS: No results found for: PTT, INR, FIBR  POC: No results found for: BGM, HCG, HCGS  HEPATIC:   Lab Results   Component Value Date    ALBUMIN 3.7 11/10/2016    PROTTOTAL 7.1 11/10/2016    ALT 24 11/10/2016    AST 20 11/10/2016    ALKPHOS 101 11/10/2016    BILITOTAL 0.2 11/10/2016     OTHER:   Lab Results   Component Value Date    SONIA 8.9 11/10/2016    MAG 2.3 11/10/2016    TSH 3.66 11/22/2021    T4 1.05 06/26/2020    T3 124 10/14/2016    CRP 0.20 01/03/2003       Anesthesia Plan    ASA Status:  2   NPO Status:  NPO Appropriate    Anesthesia Type: MAC.     - Reason for MAC: straight local not clinically adequate   Induction: Intravenous.   Maintenance: Balanced.        Consents    Anesthesia Plan(s) and associated risks, benefits, and realistic alternatives discussed. Questions answered and patient/representative(s) expressed understanding.    - Discussed:     - Discussed with:  Patient         Postoperative Care    Pain management: IV analgesics, Oral pain medications, Multi-modal analgesia.   PONV prophylaxis: Ondansetron (or other 5HT-3), Dexamethasone or Solumedrol     Comments:                Jeannie Sharma MD

## 2022-08-09 NOTE — PROVIDER NOTIFICATION
Notified STEFFEN Brown of pt progressively becoming hypotensive. Last BP 83/52 map 58. Pt reports cramping, denies dizziness. STEFFEN Brown conducted bedside assessment and ordered 500 ml bolus of LR and continue to monitor. New LR bag started.

## 2022-08-09 NOTE — ANESTHESIA POSTPROCEDURE EVALUATION
Patient: Vanessa Mckeon    Procedure: Procedure(s):  Hystersocopy, dilation and curettage using morcellator under abdominal ultrasound guidance.       Anesthesia Type:  General    Note:  Disposition: Outpatient   Postop Pain Control: Uneventful            Sign Out: Well controlled pain   PONV: No   Neuro/Psych: Uneventful            Sign Out: Acceptable/Baseline neuro status   Airway/Respiratory: Uneventful            Sign Out: Acceptable/Baseline resp. status   CV/Hemodynamics: Uneventful            Sign Out: Acceptable CV status; No obvious hypovolemia; No obvious fluid overload   Other NRE: NONE   DID A NON-ROUTINE EVENT OCCUR? No           Last vitals:  Vitals Value Taken Time   BP 98/68 08/09/22 1240   Temp 98.2  F (36.8  C) 08/09/22 1240   Pulse 72 08/09/22 1240   Resp 14 08/09/22 1240   SpO2 96 % 08/09/22 1240       Electronically Signed By: Waylon Wayne MD  August 9, 2022  1:00 PM

## 2022-08-10 ENCOUNTER — TELEPHONE (OUTPATIENT)
Dept: OBGYN | Facility: CLINIC | Age: 67
End: 2022-08-10

## 2022-08-10 LAB
PATH REPORT.COMMENTS IMP SPEC: NORMAL
PATH REPORT.COMMENTS IMP SPEC: NORMAL
PATH REPORT.FINAL DX SPEC: NORMAL
PATH REPORT.GROSS SPEC: NORMAL
PATH REPORT.MICROSCOPIC SPEC OTHER STN: NORMAL
PATH REPORT.RELEVANT HX SPEC: NORMAL
PHOTO IMAGE: NORMAL

## 2022-08-10 PROCEDURE — 88305 TISSUE EXAM BY PATHOLOGIST: CPT | Mod: 26 | Performed by: PATHOLOGY

## 2022-08-10 NOTE — ANESTHESIA CARE TRANSFER NOTE
Patient: Vanessa Mckeon    Procedure: Procedure(s):  Hystersocopy, dilation and curettage using morcellator under abdominal ultrasound guidance.       Diagnosis: Postmenopausal bleeding [N95.0]  Thickened endometrium [R93.89]  Cervical os stenosis [N88.2]  Diagnosis Additional Information: No value filed.    Anesthesia Type:   General     Note:      Level of Consciousness: awake  Oxygen Supplementation: face mask    Independent Airway: airway patency satisfactory and stable        Patient transferred to: PACU    Handoff Report: Identifed the Patient, Identified the Reponsible Provider, Reviewed the pertinent medical history, Discussed the surgical course, Reviewed Intra-OP anesthesia mangement and issues during anesthesia, Set expectations for post-procedure period and Allowed opportunity for questions and acknowledgement of understanding      Vitals:  Vitals Value Taken Time   /83 08/09/22 1400   Temp 98  F (36.7  C) 08/09/22 1400   Pulse 80 08/09/22 1400   Resp 18 08/09/22 1400   SpO2 96 % 08/09/22 1400       Electronically Signed By: Heather Kraft MD  August 9, 2022  8:53 PM

## 2022-08-10 NOTE — TELEPHONE ENCOUNTER
Post op pt calls to ask when you want her to come for post op check up.    Also, if no appts okay to dbl book?    Myra ALBERT RN BSN

## 2022-08-11 NOTE — TELEPHONE ENCOUNTER
I would have her return in 2 weeks for her post-op and yes I am fine with double booking her.   Thank you.     Linda Schmitt MD

## 2022-08-18 NOTE — TELEPHONE ENCOUNTER
Patient informed of results and recommendations.  Not available next week for appt.  Scheduled 9/1/22  Marcella Esquivel RN

## 2022-08-18 NOTE — TELEPHONE ENCOUNTER
YOHANA for pt to cb.   Nml pathology.  Schedule post op next week, okay to dbl book.    Myra ALBERT RN BSN

## 2022-09-28 ENCOUNTER — TRANSFERRED RECORDS (OUTPATIENT)
Dept: HEALTH INFORMATION MANAGEMENT | Facility: CLINIC | Age: 67
End: 2022-09-28

## 2022-10-06 ENCOUNTER — OFFICE VISIT (OUTPATIENT)
Dept: OBGYN | Facility: CLINIC | Age: 67
End: 2022-10-06
Payer: MEDICARE

## 2022-10-06 VITALS
BODY MASS INDEX: 35.33 KG/M2 | WEIGHT: 202.6 LBS | SYSTOLIC BLOOD PRESSURE: 126 MMHG | DIASTOLIC BLOOD PRESSURE: 86 MMHG | HEART RATE: 98 BPM

## 2022-10-06 DIAGNOSIS — Z48.89 POSTOPERATIVE VISIT: Primary | ICD-10-CM

## 2022-10-06 PROBLEM — E66.01 MORBID OBESITY (H): Status: ACTIVE | Noted: 2022-10-06

## 2022-10-06 PROCEDURE — 99212 OFFICE O/P EST SF 10 MIN: CPT | Performed by: OBSTETRICS & GYNECOLOGY

## 2022-10-06 NOTE — PROGRESS NOTES
Post-op visit    Ms. Vanessa Mckeon 67 year old presents for post-op visit.   She is s/p diagnostic hysteroscopy, polypectomy, dilation and curettage under abdominal ultrasound guidance.   Final pathology was benign.     Today, she is doing well. She has no complaints. Denies recurrent bleeding.     /86 (BP Location: Left arm, Cuff Size: Adult Regular)   Pulse 98   Wt 91.9 kg (202 lb 9.6 oz)   LMP 05/19/2008   Breastfeeding No   BMI 35.33 kg/m      General Appearance: NAD  Pelvic: Deferred    A/P: Postop visit  -- benign pathology reviewed  -- all other questions and concerns were addressed to patient's satisfaction  -- return for annual breast and pelvic exam or as needed    Linda Schmitt MD  [facility]

## 2022-10-06 NOTE — NURSING NOTE
"Chief Complaint   Patient presents with     Post-op Visit     Surgery 2022        Initial /86 (BP Location: Left arm, Cuff Size: Adult Regular)   Pulse 98   Wt 91.9 kg (202 lb 9.6 oz)   LMP 2008   Breastfeeding No   BMI 35.33 kg/m   Estimated body mass index is 35.33 kg/m  as calculated from the following:    Height as of 22: 1.613 m (5' 3.5\").    Weight as of this encounter: 91.9 kg (202 lb 9.6 oz).  BP completed using cuff size: regular    Questioned patient about current smoking habits.  Pt. quit smoking some time ago.          The following HM Due: NONE      Madeline Rico CMA on 10/6/2022 at 9:02 AM       "

## 2022-10-10 ENCOUNTER — MYC MEDICAL ADVICE (OUTPATIENT)
Dept: PEDIATRICS | Facility: CLINIC | Age: 67
End: 2022-10-10

## 2022-10-14 NOTE — TELEPHONE ENCOUNTER
MA/TC: please call pt to schedule a MTM appt    Thank you  Esme Carlson RN on 10/14/2022 at 9:57 AM

## 2022-10-14 NOTE — TELEPHONE ENCOUNTER
The pt is aware and scheduled for her upcoming appointment.   Heather Womack on 10/14/2022 at 10:13 AM

## 2022-10-17 NOTE — PROGRESS NOTES
Medication Therapy Management (MTM) Encounter    ASSESSMENT:                            Medication Adherence/Access: See below for considerations    Weight Management: Need improvement. Per UpToDate, candidates for drug therapy include individuals with BMI ?30 kg/m2 who have not met weight-loss goals (loss ? 5% at three to six months) with a comprehensive lifestyle intervention alone. Based on patients BMI of 35.5 kg/m2 and sustained lifestyle interventions (improved diet & physical activity) the addition of weight loss medication would be appropriate. Discussed side effects, cost, and expected weight loss benefits of medications including: Phenterimine, Topiramate, GLP-1 agonists, Naltrexone, and Bupropion. Patients health plan does not cover weight loss medications. Would consider Phentermine ($5-20/month) and/or Topiramate ($20-30/month) with GoodRx coupon if patient decides to proceed with treatment. Discussed opportunity to participate in weight loss clinic and placed referral - patient plans to look into cost. No documented history of hemoglobin A1C level, recommend updated A1C and CMP labs due to obesity. See below for hypothyroidism considerations. Patient prefers to postpone starting weight loss medication until today's labs are received.     Hypothyroidism: Last TSH is within normal limits at higher end of range. Due to patient reported symptoms of hypothyroidism including weight gain, fatigue, and weakness, would benefit from rechecking thyroid labs.     Hyperlipidemia: Patient is not on moderate intensity statin which may be considered based on 2019 ACC/AHA guidelines for lipid management and risk factor of 7.1%. Additional cardiovascular risk factors include metabolic syndrome, persistently elevated LDL-C >160 mg/dL and TG >175mg/dL, and familial history of ASCVD. Would benefit from updated lipid panel to determine if LDL-C and TG's remain elevated. Future considerations include initiation of moderate  intensity statin such as rosuvastatin 10 mg or atorvastatin 20 mg pending lipid panel results.     Headache: Stable.    Allergic rhinitis: Stable.    Supplements/Iron Deficiency Anemia: Stable.      Vaccines: Updated immunization history to reflect recent Covid-19 booster and annual High-dose influenza vaccine.     PLAN:                            Obesity/Weight Management:   Place order for A1C and CMP (glucose)   Place referral for Weight Loss Clinic. Pt will look into cost.   Discuss cost-effective weight loss medications including: Phentermine & Topiramate.   Hypothyroidism:   Placed order for thyroid panel  Hyperlipidemia:   Placed order for fasting lipid panel     Addendum to Progress Note (10/19/22):  Hypothyroidism: TSH level (10/18/22) was above normal range at 4.68 mU/L. Given elevated TSH and patient-reported symptoms of hyperthyroidism (fatigue, weight gain), recommend patient increases Levothyroxine dose to 100 mcg daily.   If the TSH is slightly elevated (eg, 5 to 10 mU/L), a small increase of 12 to 25 mcg/day is usually sufficient (Per UpToDate).     Hyperlipidemia: Updated lipids remain persistently elevated with LDL-C >160 mg/dL and TG >175mg/dL. Based on age and ASCVD risk factor of 7%, may consider risk discussion for initiation of moderate intensity statin if risk enhancers are present.     Plan (10/19/22)   1. Spoke with Tatiana Leahy and received verbal authorization to send in orders for Levothyroxine 100 mcg daily.   2. Contacted patient via phone call and not available. Will send [employer] message to discuss changes.   3. Monitor for improvement in symptoms and TSH levels at follow-up in ~2 months.   4. Spoke with Tatiana Leahy about initiating moderate intensity statin. Recommends considering initiation at follow-up visit due to patient history of medication intolerances.     Follow-up: Return in about 2 months (around 12/18/2022) for Follow up, in person, to obtain updated  labs.    SUBJECTIVE/OBJECTIVE:                          Vanessa Mckeon is a 67 year old female coming in for an initial visit. She was referred to me from Tatiana Leahy.    Reason for visit: Medication review. Wants to ensure slow weight gain/limited weight loss not attributed to medications & discuss potential weight loss medications.     Allergies/ADRs: Reviewed in chart - Now tolerates flu vaccine.  Past Medical History: Reviewed in chart  Tobacco: She reports that she quit smoking about 19 years ago. Her smoking use included cigarettes. She has a 5.00 pack-year smoking history. She has never used smokeless tobacco.  Alcohol: rarely, twice per year   Caffeine: black coffee 4 cups/day  Personal Healthcare Goals: Weight loss to improve general& cardiovascular health.    Medication Adherence/Access:   Medication barriers: cost, pt has Medicare coverage.   The patient fills medications at [facility]: YES. [facility] Sim     Weight Management/Obesity: Current medication(s) include: None. Reports she has focused on diet and exercise with limited weight benefits. Feels that her weight is trending up, wonders if this is related to her hypothyroidism/levothyroxine. Goal is to lose 60 pounds for cardiovascular and general health benefits.   Nutrition/Eating Habits: Patient reports she has never had much of an appetite. Eats to maintain health/nutrition. Typically eats one meal per day with lean meat (fish, chicken), fruits/veggies, salads. Does not typically snack.   Exercise/Activity: Walks minimum of 1 mile per day, takes ~half hour.   Failed medications include: None. Pt has never taken a medication for weight loss. May be interested in starting a new medication, but prefers to rule out thyroid factors first.     Wt Readings from Last 4 Encounters:   10/18/22 200 lb 14.4 oz (91.1 kg)   10/06/22 202 lb 9.6 oz (91.9 kg)   08/09/22 197 lb 9.6 oz (89.6 kg)   08/04/22 198 lb 6.4 oz (90 kg)     Wt Readings  "from Last 10 Encounters:   10/18/22 200 lb 14.4 oz (91.1 kg)   10/06/22 202 lb 9.6 oz (91.9 kg)   08/09/22 197 lb 9.6 oz (89.6 kg)   08/04/22 198 lb 6.4 oz (90 kg)   07/26/22 194 lb 6.4 oz (88.2 kg)   07/13/22 197 lb (89.4 kg)   12/12/21 198 lb 11.2 oz (90.1 kg)   11/22/21 197 lb 3.2 oz (89.4 kg)   10/26/20 190 lb 4.8 oz (86.3 kg)   12/03/19 196 lb (88.9 kg)     Estimated body mass index is 35.03 kg/m  as calculated from the following:    Height as of 8/9/22: 5' 3.5\" (1.613 m).    Weight as of this encounter: 200 lb 14.4 oz (91.1 kg).    Lab Results   Component Value Date    A1C 5.6 10/18/2022       No results found for: A1C    Hypothyroidism: Patient is taking levothyroxine 88 mcg daily. Patient is having the following symptoms: hypothyroidism -  fatigue, weight gain. Pt expresses concerns that weight gain may be associated with levothyroxine or thyroid.   TSH   Date Value Ref Range Status   10/18/2022 4.69 (H) 0.40 - 4.00 mU/L Final   10/19/2020 2.04 0.40 - 4.00 mU/L Final     T4 Free   Date Value Ref Range Status   06/26/2020 1.05 0.76 - 1.46 ng/dL Final     Free T4   Date Value Ref Range Status   10/18/2022 1.04 0.76 - 1.46 ng/dL Final       Hyperlipidemia: No current medications, has not previously been prescribed statin therapy. Reports both mother and father had heart issues. Father had triple bypass surgery in his 70s. Wants to ensure she is doing what she can to prevent cardiovascular event.    The 10-year ASCVD risk score (Zoe LEIVA, et al., 2019) is: 7%    Values used to calculate the score:      Age: 67 years      Sex: Female      Is Non- : No      Diabetic: No      Tobacco smoker: No      Systolic Blood Pressure: 120 mmHg      Is BP treated: No      HDL Cholesterol: 56 mg/dL      Total Cholesterol: 279 mg/dL    Recent Labs   Lab Test 10/18/22  0938 10/19/20  0924 09/27/16  0803 09/22/15  0944 09/10/14  1024   CHOL 279* 263*   < > 240* 236*   HDL 56 49*   < > 53 54   * 174* "   < > 157* 151*   TRIG 197* 198*   < > 150 157*   CHOLHDLRATIO  --   --   --  4.5 4.4    < > = values in this interval not displayed.     BP Readings from Last 3 Encounters:   10/18/22 120/84   10/06/22 126/86   08/09/22 122/83     Headaches: Current medications include naproxen (Aleve) 220mg one tablet as needed for headache. Reports rarely taking. Does not take any additional OTC medications for pain or headaches.     Allergic Rhinitis: Current medications include Flonase 1-2 sprays in each nostril daily. Patient is not experiencing side effects.  Primary triggers are seasonal allergens (fall and spring). Patient feels that current therapy is effective.     Supplements/Iron Deficiency Anemia: Currently taking MV tablet daily, Iron complex daily. No reported issues at this time. Pt is unsure what dose/strength of iron she is taking, but endorses taking for many year without side effects of iron overload.   Ferritin   Date Value Ref Range Status   09/08/2011 31 10 - 300 ng/mL Final     Iron   Date Value Ref Range Status   09/27/2016 66 35 - 180 ug/dL Final     Iron Binding Cap   Date Value Ref Range Status   09/27/2016 291 240 - 430 ug/dL Final     Vaccines: Reports she received Covid-19 booster and HD influenza vaccine at pharmacy. Presents documentation for vaccines.   Immunization History   Administered Date(s) Administered     COVID-19,PF,Pfizer (12+ Yrs) 02/22/2021, 03/15/2021, 11/22/2021     COVID-19,PF,Pfizer 12+ YRS BIVALENT Booster 10/12/2022     COVID-19,PF,Pfizer 12+ Yrs (2022 and After) 04/22/2022     FLUAD(HD)65+ QUAD 09/24/2021     Flu 65+ Years 09/30/2022     Influenza Vaccine IM > 6 months Valent IIV4 (Alfuria,Fluzone) 10/26/2020, 09/24/2021     Pneumo Conj 13-V (2010&after) 10/26/2020     Pneumococcal 23 valent 11/22/2021     TD (ADULT, 7+) 04/10/2006     TDAP Vaccine (Adacel) 07/01/2013     Tetanus 03/12/1993     Zoster vaccine recombinant adjuvanted (SHINGRIX) 11/05/2019, 05/29/2020     Zoster  vaccine, live 12/21/2016     Today's Vitals: /84   Wt 200 lb 14.4 oz (91.1 kg)   LMP 05/19/2008   BMI 35.03 kg/m    ----------------    I spent 60 minutes with this patient today. All changes were made via verbal approval with LENARD Higuera CNP. A copy of the visit note was provided to the patient's provider(s).    The patient was given a summary of these recommendations.     Madelaine Lutz, PharmD [facility]  Medication Therapy Management Practitioner   #[phone]    Norma Pires, PharmD  Medication Therapy Management Resident     Medication Therapy Recommendations  Hypothyroidism, unspecified type    Current Medication: levothyroxine (SYNTHROID/LEVOTHROID) 88 MCG tablet (Discontinued)   Rationale: Dose too low - Dosage too low - Effectiveness   Recommendation: Increase Dose - levothyroxine 100 MCG tablet - Stop Levothyroxine 88 mcg daily. Start Levothyroxine 100 mcg daily.   Status: Accepted per Provider

## 2022-10-18 ENCOUNTER — LAB (OUTPATIENT)
Dept: LAB | Facility: CLINIC | Age: 67
End: 2022-10-18
Payer: MEDICARE

## 2022-10-18 ENCOUNTER — OFFICE VISIT (OUTPATIENT)
Dept: PHARMACY | Facility: CLINIC | Age: 67
End: 2022-10-18
Payer: OTHER GOVERNMENT

## 2022-10-18 VITALS — BODY MASS INDEX: 35.03 KG/M2 | DIASTOLIC BLOOD PRESSURE: 84 MMHG | SYSTOLIC BLOOD PRESSURE: 120 MMHG | WEIGHT: 200.9 LBS

## 2022-10-18 DIAGNOSIS — E66.01 MORBID OBESITY (H): Primary | ICD-10-CM

## 2022-10-18 DIAGNOSIS — E78.5 HYPERLIPIDEMIA LDL GOAL <160: ICD-10-CM

## 2022-10-18 DIAGNOSIS — E66.01 MORBID OBESITY (H): ICD-10-CM

## 2022-10-18 DIAGNOSIS — Z78.9 TAKES DIETARY SUPPLEMENTS: ICD-10-CM

## 2022-10-18 DIAGNOSIS — J30.2 SEASONAL ALLERGIC RHINITIS, UNSPECIFIED TRIGGER: ICD-10-CM

## 2022-10-18 DIAGNOSIS — E03.9 HYPOTHYROIDISM, UNSPECIFIED TYPE: ICD-10-CM

## 2022-10-18 DIAGNOSIS — Z71.85 VACCINE COUNSELING: ICD-10-CM

## 2022-10-18 DIAGNOSIS — D50.9 IRON DEFICIENCY ANEMIA, UNSPECIFIED IRON DEFICIENCY ANEMIA TYPE: ICD-10-CM

## 2022-10-18 DIAGNOSIS — G44.209 TENSION-TYPE HEADACHE, NOT INTRACTABLE, UNSPECIFIED CHRONICITY PATTERN: ICD-10-CM

## 2022-10-18 LAB
ALBUMIN SERPL-MCNC: 3.6 G/DL (ref 3.4–5)
ALP SERPL-CCNC: 87 U/L (ref 40–150)
ALT SERPL W P-5'-P-CCNC: 22 U/L (ref 0–50)
ANION GAP SERPL CALCULATED.3IONS-SCNC: 8 MMOL/L (ref 3–14)
AST SERPL W P-5'-P-CCNC: 18 U/L (ref 0–45)
BILIRUB SERPL-MCNC: 0.3 MG/DL (ref 0.2–1.3)
BUN SERPL-MCNC: 12 MG/DL (ref 7–30)
CALCIUM SERPL-MCNC: 9 MG/DL (ref 8.5–10.1)
CHLORIDE BLD-SCNC: 108 MMOL/L (ref 94–109)
CHOLEST SERPL-MCNC: 279 MG/DL
CO2 SERPL-SCNC: 23 MMOL/L (ref 20–32)
CREAT SERPL-MCNC: 0.61 MG/DL (ref 0.52–1.04)
FASTING STATUS PATIENT QL REPORTED: YES
GFR SERPL CREATININE-BSD FRML MDRD: >90 ML/MIN/1.73M2
GLUCOSE BLD-MCNC: 99 MG/DL (ref 70–99)
HBA1C MFR BLD: 5.6 % (ref 0–5.6)
HDLC SERPL-MCNC: 56 MG/DL
LDLC SERPL CALC-MCNC: 184 MG/DL
NONHDLC SERPL-MCNC: 223 MG/DL
POTASSIUM BLD-SCNC: 4.2 MMOL/L (ref 3.4–5.3)
PROT SERPL-MCNC: 7.3 G/DL (ref 6.8–8.8)
SODIUM SERPL-SCNC: 139 MMOL/L (ref 133–144)
T4 FREE SERPL-MCNC: 1.04 NG/DL (ref 0.76–1.46)
TRIGL SERPL-MCNC: 197 MG/DL
TSH SERPL DL<=0.005 MIU/L-ACNC: 4.69 MU/L (ref 0.4–4)

## 2022-10-18 PROCEDURE — 84443 ASSAY THYROID STIM HORMONE: CPT

## 2022-10-18 PROCEDURE — 80053 COMPREHEN METABOLIC PANEL: CPT

## 2022-10-18 PROCEDURE — 83036 HEMOGLOBIN GLYCOSYLATED A1C: CPT

## 2022-10-18 PROCEDURE — 80061 LIPID PANEL: CPT

## 2022-10-18 PROCEDURE — 36415 COLL VENOUS BLD VENIPUNCTURE: CPT

## 2022-10-18 PROCEDURE — 84439 ASSAY OF FREE THYROXINE: CPT

## 2022-10-18 RX ORDER — NAPROXEN SODIUM 220 MG
220 TABLET ORAL
COMMUNITY
End: 2022-10-18

## 2022-10-18 NOTE — PATIENT INSTRUCTIONS
"Recommendations from today's MTM visit:                                                    MTM (medication therapy management) is a service provided by a clinical pharmacist designed to help you get the most of out of your medicines.   Today we reviewed what your medicines are for, how to know if they are working, that your medicines are safe and how to make your medicine regimen as easy as possible.      We placed a referral for [facility] clinic - you can contact them and see what options you would have.   If you decide to move forward with starting a new medication for weight loss - you can schedule an appointment with us over [employer].   We will reach out to you via [employer] about the results of today's labs. Ordered updated cholesterol/lipids, hemoglobin A1c (blood sugar), thyroid panel, comprehensive metabolic panel (random glucose level)     Follow-up: We will contact you via [employer] once we receive your labs. Feel free to reach out over [employer] or phone to set up another visit with us if you are interested in starting a weight loss medication.     It was great speaking with you today.  I value your experience and would be very thankful for your time in providing feedback in our clinic survey. In the next few days, you may receive an email or text message from [employer] with a link to a survey related to your  clinical pharmacist.\"     To schedule another MTM appointment, please call the clinic directly or you may call the MTM scheduling line at [phone] or toll-free at [phone].     My Clinical Pharmacist's contact information:                                                      Please feel free to contact me with any questions or concerns you have.      Madelaine Lutz, PharmD [facility]  Medication Therapy Management Practitioner   #[phone]    Norma Pires PharmD  Medication Therapy Management Resident     "

## 2022-10-19 PROCEDURE — 99605 MTMS BY PHARM NP 15 MIN: CPT | Performed by: PHARMACIST

## 2022-10-19 PROCEDURE — 99607 MTMS BY PHARM ADDL 15 MIN: CPT | Performed by: PHARMACIST

## 2022-10-19 RX ORDER — LEVOTHYROXINE SODIUM 100 UG/1
100 TABLET ORAL DAILY
Qty: 30 TABLET | Refills: 2 | Status: SHIPPED | OUTPATIENT
Start: 2022-10-19 | End: 2022-11-28

## 2022-11-28 ENCOUNTER — OFFICE VISIT (OUTPATIENT)
Dept: PEDIATRICS | Facility: CLINIC | Age: 67
End: 2022-11-28
Payer: MEDICARE

## 2022-11-28 ENCOUNTER — ANCILLARY PROCEDURE (OUTPATIENT)
Dept: MAMMOGRAPHY | Facility: CLINIC | Age: 67
End: 2022-11-28
Attending: NURSE PRACTITIONER
Payer: MEDICARE

## 2022-11-28 VITALS
HEIGHT: 63 IN | DIASTOLIC BLOOD PRESSURE: 74 MMHG | HEART RATE: 84 BPM | OXYGEN SATURATION: 95 % | WEIGHT: 198.5 LBS | RESPIRATION RATE: 16 BRPM | TEMPERATURE: 97.3 F | SYSTOLIC BLOOD PRESSURE: 120 MMHG | BODY MASS INDEX: 35.17 KG/M2

## 2022-11-28 DIAGNOSIS — Z12.31 VISIT FOR SCREENING MAMMOGRAM: ICD-10-CM

## 2022-11-28 DIAGNOSIS — E03.9 HYPOTHYROIDISM, UNSPECIFIED TYPE: ICD-10-CM

## 2022-11-28 DIAGNOSIS — E66.01 MORBID OBESITY (H): ICD-10-CM

## 2022-11-28 DIAGNOSIS — E78.5 HYPERLIPIDEMIA LDL GOAL <160: ICD-10-CM

## 2022-11-28 DIAGNOSIS — Z00.00 ENCOUNTER FOR MEDICARE ANNUAL WELLNESS EXAM: Primary | ICD-10-CM

## 2022-11-28 DIAGNOSIS — Z12.4 CERVICAL CANCER SCREENING: ICD-10-CM

## 2022-11-28 PROCEDURE — 87624 HPV HI-RISK TYP POOLED RSLT: CPT | Performed by: NURSE PRACTITIONER

## 2022-11-28 PROCEDURE — 77067 SCR MAMMO BI INCL CAD: CPT | Mod: TC | Performed by: RADIOLOGY

## 2022-11-28 PROCEDURE — G0439 PPPS, SUBSEQ VISIT: HCPCS | Performed by: NURSE PRACTITIONER

## 2022-11-28 PROCEDURE — 99214 OFFICE O/P EST MOD 30 MIN: CPT | Mod: 25 | Performed by: NURSE PRACTITIONER

## 2022-11-28 PROCEDURE — G0145 SCR C/V CYTO,THINLAYER,RESCR: HCPCS | Performed by: NURSE PRACTITIONER

## 2022-11-28 RX ORDER — ATORVASTATIN CALCIUM 10 MG/1
10 TABLET, FILM COATED ORAL DAILY
Qty: 90 TABLET | Refills: 3 | Status: SHIPPED | OUTPATIENT
Start: 2022-11-28 | End: 2023-11-28

## 2022-11-28 RX ORDER — LEVOTHYROXINE SODIUM 100 UG/1
100 TABLET ORAL DAILY
Qty: 90 TABLET | Refills: 4 | Status: SHIPPED | OUTPATIENT
Start: 2022-11-28 | End: 2024-03-06

## 2022-11-28 SDOH — ECONOMIC STABILITY: FOOD INSECURITY: WITHIN THE PAST 12 MONTHS, YOU WORRIED THAT YOUR FOOD WOULD RUN OUT BEFORE YOU GOT MONEY TO BUY MORE.: NEVER TRUE

## 2022-11-28 SDOH — ECONOMIC STABILITY: INCOME INSECURITY: IN THE LAST 12 MONTHS, WAS THERE A TIME WHEN YOU WERE NOT ABLE TO PAY THE MORTGAGE OR RENT ON TIME?: NO

## 2022-11-28 SDOH — HEALTH STABILITY: PHYSICAL HEALTH: ON AVERAGE, HOW MANY MINUTES DO YOU ENGAGE IN EXERCISE AT THIS LEVEL?: 30 MIN

## 2022-11-28 SDOH — ECONOMIC STABILITY: INCOME INSECURITY: HOW HARD IS IT FOR YOU TO PAY FOR THE VERY BASICS LIKE FOOD, HOUSING, MEDICAL CARE, AND HEATING?: NOT VERY HARD

## 2022-11-28 SDOH — ECONOMIC STABILITY: FOOD INSECURITY: WITHIN THE PAST 12 MONTHS, THE FOOD YOU BOUGHT JUST DIDN'T LAST AND YOU DIDN'T HAVE MONEY TO GET MORE.: NEVER TRUE

## 2022-11-28 SDOH — HEALTH STABILITY: PHYSICAL HEALTH: ON AVERAGE, HOW MANY DAYS PER WEEK DO YOU ENGAGE IN MODERATE TO STRENUOUS EXERCISE (LIKE A BRISK WALK)?: 3 DAYS

## 2022-11-28 ASSESSMENT — LIFESTYLE VARIABLES
SKIP TO QUESTIONS 9-10: 1
HOW MANY STANDARD DRINKS CONTAINING ALCOHOL DO YOU HAVE ON A TYPICAL DAY: 1 OR 2
AUDIT-C TOTAL SCORE: 1
HOW OFTEN DO YOU HAVE A DRINK CONTAINING ALCOHOL: MONTHLY OR LESS
HOW OFTEN DO YOU HAVE SIX OR MORE DRINKS ON ONE OCCASION: NEVER

## 2022-11-28 ASSESSMENT — ENCOUNTER SYMPTOMS
COUGH: 0
NERVOUS/ANXIOUS: 0
SHORTNESS OF BREATH: 0
HEARTBURN: 0
DIZZINESS: 0
WEAKNESS: 0
HEADACHES: 0
FEVER: 0
DIARRHEA: 0
DYSURIA: 0
ARTHRALGIAS: 0
PALPITATIONS: 0
CONSTIPATION: 0
SORE THROAT: 0
BREAST MASS: 0
PARESTHESIAS: 0
FREQUENCY: 0
CHILLS: 0
MYALGIAS: 0
EYE PAIN: 0
NAUSEA: 0
HEMATOCHEZIA: 0
JOINT SWELLING: 0
HEMATURIA: 0
ABDOMINAL PAIN: 0

## 2022-11-28 ASSESSMENT — SOCIAL DETERMINANTS OF HEALTH (SDOH)
HOW OFTEN DO YOU GET TOGETHER WITH FRIENDS OR RELATIVES?: ONCE A WEEK
DO YOU BELONG TO ANY CLUBS OR ORGANIZATIONS SUCH AS CHURCH GROUPS UNIONS, FRATERNAL OR ATHLETIC GROUPS, OR SCHOOL GROUPS?: NO
HOW OFTEN DO YOU ATTEND CHURCH OR RELIGIOUS SERVICES?: MORE THAN 4 TIMES PER YEAR
IN A TYPICAL WEEK, HOW MANY TIMES DO YOU TALK ON THE PHONE WITH FAMILY, FRIENDS, OR NEIGHBORS?: MORE THAN THREE TIMES A WEEK

## 2022-11-28 ASSESSMENT — PAIN SCALES - GENERAL: PAINLEVEL: NO PAIN (0)

## 2022-11-28 ASSESSMENT — ACTIVITIES OF DAILY LIVING (ADL): CURRENT_FUNCTION: NO ASSISTANCE NEEDED

## 2022-11-28 NOTE — PROGRESS NOTES
"SUBJECTIVE:   Keshia is a 67 year old who presents for Preventive Visit.    Patient has been advised of split billing requirements and indicates understanding: Yes  Are you in the first 12 months of your Medicare coverage?  No    Healthy Habits:     In general, how would you rate your overall health?  Good    Frequency of exercise:  2-3 days/week    Duration of exercise:  15-30 minutes    Do you usually eat at least 4 servings of fruit and vegetables a day, include whole grains    & fiber and avoid regularly eating high fat or \"junk\" foods?  No    Taking medications regularly:  Yes    Medication side effects:  None    Ability to successfully perform activities of daily living:  No assistance needed    Home Safety:  No safety concerns identified    Hearing Impairment:  No hearing concerns    In the past 6 months, have you been bothered by leaking of urine?  No    In general, how would you rate your overall mental or emotional health?  Fair      PHQ-2 Total Score: 0    Additional concerns today:  No    TSH   Date Value Ref Range Status   10/18/2022 4.69 (H) 0.40 - 4.00 mU/L Final   10/19/2020 2.04 0.40 - 4.00 mU/L Final     Wt Readings from Last 4 Encounters:   11/28/22 90 kg (198 lb 8 oz)   10/18/22 91.1 kg (200 lb 14.4 oz)   10/06/22 91.9 kg (202 lb 9.6 oz)   08/09/22 89.6 kg (197 lb 9.6 oz)         Have you ever done Advance Care Planning? (For example, a Health Directive, POLST, or a discussion with a medical provider or your loved ones about your wishes): No, advance care planning information given to patient to review.  Patient declined advance care planning discussion at this time.    Hearing test not done   Fall risk  Fallen 2 or more times in the past year?: No  Any fall with injury in the past year?: No    Cognitive Screening   1) Repeat 3 items (Leader, Season, Table)    2) Clock draw: NORMAL  3) 3 item recall: Recalls 3 objects  Results: 3 items recalled: COGNITIVE IMPAIRMENT LESS LIKELY    Mini-CogTM " Copyright KASSI Miller. Licensed by the author for use in [facility]; reprinted with permission (chandler@[facility]). All rights reserved.      Do you have sleep apnea, excessive snoring or daytime drowsiness?: no    Reviewed and updated as needed this visit by clinical staff   Tobacco  Allergies  Meds              Reviewed and updated as needed this visit by Provider     Meds             Social History     Tobacco Use     Smoking status: Former     Packs/day: 0.50     Years: 10.00     Pack years: 5.00     Types: Cigarettes     Quit date: 2003     Years since quittin.6     Smokeless tobacco: Never   Substance Use Topics     Alcohol use: Yes     Comment: very little     If you drink alcohol do you typically have >3 drinks per day or >7 drinks per week? No    Alcohol Use 2022   Prescreen: >3 drinks/day or >7 drinks/week? No   Prescreen: >3 drinks/day or >7 drinks/week? -   No flowsheet data found.            Current providers sharing in care for this patient include:   Patient Care Team:  Tatiana Leahy APRN CNP as PCP - General (Family Practice)  Tatiana Leahy APRN CNP as Assigned PCP  Linda Schmitt MD as Assigned OBGYN Provider  Madelaine Lutz RPH as Pharmacist (Pharmacist)  Norma Pires RPH as Pharmacist (Pharmacist)  Madelaine Lutz RPH as Assigned [facility] Pharmacist    The following health maintenance items are reviewed in Epic and correct as of today:  Health Maintenance   Topic Date Due     MEDICARE ANNUAL WELLNESS VISIT  2022     PAP FOLLOW-UP  2022     HPV FOLLOW-UP  2022     DTAP/TDAP/TD IMMUNIZATION (3 - Td or Tdap) 2023     TSH W/FREE T4 REFLEX  10/18/2023     ANNUAL REVIEW OF HM ORDERS  2023     FALL RISK ASSESSMENT  2023     MAMMO SCREENING  2024     COLORECTAL CANCER SCREENING  2025     LIPID  10/18/2027     ADVANCE CARE PLANNING  2027     DEXA  2028     HEPATITIS C  SCREENING  Completed     PHQ-2 (once per calendar year)  Completed     INFLUENZA VACCINE  Completed     Pneumococcal Vaccine: 65+ Years  Completed     ZOSTER IMMUNIZATION  Completed     COVID-19 Vaccine  Completed     IPV IMMUNIZATION  Aged Out     MENINGITIS IMMUNIZATION  Aged Out     Lab work is in process    Any new diagnosis of family breast, ovarian, or bowel cancer? No    FHS-7:   Breast CA Risk Assessment (FHS-7) 11/19/2021 11/26/2021 7/29/2022 11/28/2022 11/28/2022   Did any of your first-degree relatives have breast or ovarian cancer? Yes Yes Yes Yes Yes   Did any of your relatives have bilateral breast cancer? Unknown No No No Unknown   Did any man in your family have breast cancer? No No No No No   Did any woman in your family have breast and ovarian cancer? Yes No Yes No No   Did any woman in your family have breast cancer before age 50 y? No No No No No   Do you have 2 or more relatives with breast and/or ovarian cancer? No No No No No   Do you have 2 or more relatives with breast and/or bowel cancer? No No No No No       Mammogram Screening: Recommended mammography every 1-2 years with patient discussion and risk factor consideration  Pertinent mammograms are reviewed under the imaging tab.    Review of Systems   Constitutional: Negative for chills and fever.   HENT: Negative for congestion, ear pain, hearing loss and sore throat.    Eyes: Negative for pain and visual disturbance.   Respiratory: Negative for cough and shortness of breath.    Cardiovascular: Negative for chest pain, palpitations and peripheral edema.   Gastrointestinal: Negative for abdominal pain, constipation, diarrhea, heartburn, hematochezia and nausea.   Breasts:  Negative for tenderness, breast mass and discharge.   Genitourinary: Negative for dysuria, frequency, genital sores, hematuria, pelvic pain, urgency, vaginal bleeding and vaginal discharge.   Musculoskeletal: Negative for arthralgias, joint swelling and myalgias.   Skin:  "Negative for rash.   Neurological: Negative for dizziness, weakness, headaches and paresthesias.   Psychiatric/Behavioral: Negative for mood changes. The patient is not nervous/anxious.          OBJECTIVE:   /74 (BP Location: Right arm, Patient Position: Sitting, Cuff Size: Adult Regular)   Pulse 84   Temp 97.3  F (36.3  C) (Temporal)   Resp 16   Ht 1.588 m (5' 2.5\")   Wt 90 kg (198 lb 8 oz)   LMP 05/19/2008   SpO2 95%   BMI 35.73 kg/m   Estimated body mass index is 35.73 kg/m  as calculated from the following:    Height as of this encounter: 1.588 m (5' 2.5\").    Weight as of this encounter: 90 kg (198 lb 8 oz).  Physical Exam  GENERAL: healthy, alert and no distress  EYES: Eyes grossly normal to inspection, PERRL and conjunctivae and sclerae normal  HENT: ear canals and TM's normal, nose and mouth without ulcers or lesions  NECK: no adenopathy, no asymmetry, masses, or scars and thyroid normal to palpation  RESP: lungs clear to auscultation - no rales, rhonchi or wheezes  CV: regular rate and rhythm, normal S1 S2, no S3 or S4, no murmur, click or rub, no peripheral edema and peripheral pulses strong  ABDOMEN: soft, nontender, no hepatosplenomegaly, no masses and bowel sounds normal   (female): normal female external genitalia, normal urethral meatus, vaginal mucosa, normal cervix/adnexa/uterus without masses or discharge  MS: no gross musculoskeletal defects noted, no edema  PSYCH: mentation appears normal, affect normal/bright      ASSESSMENT / PLAN:   (Z00.00) Encounter for Medicare annual wellness exam  (primary encounter diagnosis)  Comment:   Plan:     (Z12.4) Cervical cancer screening  Comment:   Plan: Pap Screen with HPV - recommended age 30 - 65         years          (E03.9) Hypothyroidism, unspecified type  Comment: stable, labs reviewed.   Plan: levothyroxine (SYNTHROID/LEVOTHROID) 100 MCG         tablet          (E66.01) Morbid obesity (H)  Comment: has slowly been losing wt.   Plan: " "    (E78.5) Hyperlipidemia LDL goal <160  Comment: reviewd last lipids, discussed ascvd guidelines. Willing to start statin. Medication se, risks reviewed. Will repeat lipds in 3 mo  Plan: atorvastatin (LIPITOR) 10 MG tablet, Lipid         panel reflex to direct LDL Fasting              COUNSELING:  Reviewed preventive health counseling, as reflected in patient instructions       Regular exercise       Healthy diet/nutrition       Vision screening       Hearing screening       Dental care       Bladder control       Osteoporosis prevention/bone health      BMI:   Estimated body mass index is 35.73 kg/m  as calculated from the following:    Height as of this encounter: 1.588 m (5' 2.5\").    Weight as of this encounter: 90 kg (198 lb 8 oz).   Weight management plan: Discussed healthy diet and exercise guidelines      She reports that she quit smoking about 19 years ago. Her smoking use included cigarettes. She has a 5.00 pack-year smoking history. She has never used smokeless tobacco.      Appropriate preventive services were discussed with this patient, including applicable screening as appropriate for cardiovascular disease, diabetes, osteopenia/osteoporosis, and glaucoma.  As appropriate for age/gender, discussed screening for colorectal cancer, prostate cancer, breast cancer, and cervical cancer. Checklist reviewing preventive services available has been given to the patient.    Reviewed patients plan of care and provided an AVS. The Intermediate Care Plan ( asthma action plan, low back pain action plan, and migraine action plan) for Vanessa meets the Care Plan requirement. This Care Plan has been established and reviewed with the Patient.          LEN[facility]    Identified Health Risks:    The patient was counseled and encouraged to consider modifying their diet and eating habits. She was provided with information on recommended healthy diet options.  The " patient was provided with suggestions to help her develop a healthy emotional lifestyle.

## 2022-11-28 NOTE — PATIENT INSTRUCTIONS
Patient Education   Personalized Prevention Plan  You are due for the preventive services outlined below.  Your care team is available to assist you in scheduling these services.  If you have already completed any of these items, please share that information with your care team to update in your medical record.  Health Maintenance Due   Topic Date Due     ANNUAL REVIEW OF HM ORDERS  11/22/2022     Annual Wellness Visit  11/22/2022     PAP  11/22/2022     HPV Follow Up  11/22/2022       Understanding USDA MyPlate  The USDA has guidelines to help you make healthy food choices. These are called MyPlate. MyPlate shows the food groups that make up healthy meals using the image of a place setting. Before you eat, think about the healthiest choices for what to put on your plate or in your cup or bowl. To learn more about building a healthy plate, visit www.choosemyplate.gov.    The food groups    Fruits. Any fruit or 100% fruit juice counts as part of the Fruit Group. Fruits may be fresh, canned, frozen, or dried, and may be whole, cut-up, or pureed. Make 1/2 of your plate fruits and vegetables.    Vegetables. Any vegetable or 100% vegetable juice counts as a member of the Vegetable Group. Vegetables may be fresh, frozen, canned, or dried. They can be served raw or cooked and may be whole, cut-up, or mashed. Make 1/2 of your plate fruits and vegetables.    Grains. All foods made from grains are part of the Grains Group. These include wheat, rice, oats, cornmeal, and barley. Grains are often used to make foods such as bread, pasta, oatmeal, cereal, tortillas, and grits. Grains should be no more than 1/4 of your plate. At least half of your grains should be whole grains.    Protein. This group includes meat, poultry, seafood, beans and peas, eggs, processed soy products (such as tofu), nuts (including nut butters), and seeds. Make protein choices no more than 1/4 of your plate. Meat and poultry choices should be lean or  low fat.    Dairy. The Dairy Group includes all fluid milk products and foods made from milk that contain calcium, such as yogurt and cheese. (Foods that have little calcium, such as cream, butter, and cream cheese, are not part of this group.) Most dairy choices should be low-fat or fat-free.    Oils. Oils aren't a food group, but they do contain essential nutrients. However it's important to watch your intake of oils. These are fats that are liquid at room temperature. They include canola, corn, olive, soybean, vegetable, and sunflower oil. Foods that are mainly oil include mayonnaise, certain salad dressings, and soft margarines. You likely already get your daily oil allowance from the foods you eat.  Things to limit  Eating healthy also means limiting these things in your diet:       Salt (sodium). Many processed foods have a lot of sodium. To keep sodium intake down, eat fresh vegetables, meats, poultry, and seafood when possible. Purchase low-sodium, reduced-sodium, or no-salt-added food products at the store. And don't add salt to your meals at home. Instead, season them with herbs and spices such as dill, oregano, cumin, and paprika. Or try adding flavor with lemon or lime zest and juice.    Saturated fat. Saturated fats are most often found in animal products such as beef, pork, and chicken. They are often solid at room temperature, such as butter. To reduce your saturated fat intake, choose leaner cuts of meat and poultry. And try healthier cooking methods such as grilling, broiling, roasting, or baking. For a simple lower-fat swap, use plain nonfat yogurt instead of mayonnaise when making potato salad or macaroni salad.    Added sugars. These are sugars added to foods. They are in foods such as ice cream, candy, soda, fruit drinks, sports drinks, energy drinks, cookies, pastries, jams, and syrups. Cut down on added sugars by sharing sweet treats with a family member or friend. You can also choose fruit  for dessert, and drink water or other unsweetened beverages.     [employer] last reviewed this educational content on 6/1/2020 2000-2021 [employer]. All rights reserved. This information is not intended as a substitute for professional medical care. Always follow your healthcare professional's instructions.        Your Health Risk Assessment indicates you feel you are not in good emotional health.    Recreation   Recreation is not limited to sports and team events. It includes any activity that provides relaxation, interest, enjoyment, and exercise. Recreation provides an outlet for physical, mental, and social energy. It can give a sense of worth and achievement. It can help you stay healthy.    Mental Exercise and Social Involvement  Mental and emotional health is as important as physical health. Keep in touch with friends and family. Stay as active as possible. Continue to learn and challenge yourself.   Things you can do to stay mentally active are:    Learn something new, like a foreign language or musical instrument.     Play SCRABBLE or do crossword puzzles. If you cannot find people to play these games with you at home, you can play them with others on your computer through the Internet.     Join a games club--anything from card games to chess or checkers or lawn bowling.     Start a new hobby.     Go back to school.     Volunteer.     Read.   Keep up with world events.

## 2022-11-30 LAB
BKR LAB AP GYN ADEQUACY: NORMAL
BKR LAB AP GYN INTERPRETATION: NORMAL
BKR LAB AP HPV REFLEX: NORMAL
BKR LAB AP PREVIOUS ABNL DX: NORMAL
BKR LAB AP PREVIOUS ABNORMAL: NORMAL
PATH REPORT.COMMENTS IMP SPEC: NORMAL
PATH REPORT.COMMENTS IMP SPEC: NORMAL
PATH REPORT.RELEVANT HX SPEC: NORMAL

## 2023-01-26 ENCOUNTER — TRANSFERRED RECORDS (OUTPATIENT)
Dept: HEALTH INFORMATION MANAGEMENT | Facility: CLINIC | Age: 68
End: 2023-01-26

## 2023-01-28 ENCOUNTER — LAB (OUTPATIENT)
Dept: LAB | Facility: CLINIC | Age: 68
End: 2023-01-28
Payer: MEDICARE

## 2023-01-28 DIAGNOSIS — E78.5 HYPERLIPIDEMIA LDL GOAL <160: ICD-10-CM

## 2023-01-28 DIAGNOSIS — E03.9 HYPOTHYROIDISM, UNSPECIFIED TYPE: ICD-10-CM

## 2023-01-28 LAB
CHOLEST SERPL-MCNC: 161 MG/DL
HDLC SERPL-MCNC: 52 MG/DL
LDLC SERPL CALC-MCNC: 85 MG/DL
NONHDLC SERPL-MCNC: 109 MG/DL
TRIGL SERPL-MCNC: 122 MG/DL
TSH SERPL DL<=0.005 MIU/L-ACNC: 2.69 UIU/ML (ref 0.3–4.2)

## 2023-01-28 PROCEDURE — 36415 COLL VENOUS BLD VENIPUNCTURE: CPT

## 2023-01-28 PROCEDURE — 80061 LIPID PANEL: CPT

## 2023-01-28 PROCEDURE — 84443 ASSAY THYROID STIM HORMONE: CPT

## 2023-06-15 ENCOUNTER — OFFICE VISIT (OUTPATIENT)
Dept: PEDIATRICS | Facility: CLINIC | Age: 68
End: 2023-06-15
Payer: MEDICARE

## 2023-06-15 VITALS
HEART RATE: 84 BPM | WEIGHT: 201.8 LBS | HEIGHT: 62 IN | SYSTOLIC BLOOD PRESSURE: 116 MMHG | RESPIRATION RATE: 18 BRPM | TEMPERATURE: 98.6 F | DIASTOLIC BLOOD PRESSURE: 78 MMHG | OXYGEN SATURATION: 96 % | BODY MASS INDEX: 37.13 KG/M2

## 2023-06-15 DIAGNOSIS — E03.9 HYPOTHYROIDISM, UNSPECIFIED TYPE: Primary | ICD-10-CM

## 2023-06-15 DIAGNOSIS — E66.01 MORBID OBESITY (H): ICD-10-CM

## 2023-06-15 DIAGNOSIS — E78.5 HYPERLIPIDEMIA LDL GOAL <160: ICD-10-CM

## 2023-06-15 DIAGNOSIS — D50.9 IRON DEFICIENCY ANEMIA, UNSPECIFIED IRON DEFICIENCY ANEMIA TYPE: ICD-10-CM

## 2023-06-15 DIAGNOSIS — R53.83 OTHER FATIGUE: ICD-10-CM

## 2023-06-15 DIAGNOSIS — M54.42 ACUTE BILATERAL LOW BACK PAIN WITH LEFT-SIDED SCIATICA: ICD-10-CM

## 2023-06-15 LAB
ALBUMIN SERPL BCG-MCNC: 4.1 G/DL (ref 3.5–5.2)
ALP SERPL-CCNC: 85 U/L (ref 35–104)
ALT SERPL W P-5'-P-CCNC: 21 U/L (ref 0–50)
ANION GAP SERPL CALCULATED.3IONS-SCNC: 12 MMOL/L (ref 7–15)
AST SERPL W P-5'-P-CCNC: 23 U/L (ref 0–45)
BILIRUB SERPL-MCNC: 0.3 MG/DL
BUN SERPL-MCNC: 13.2 MG/DL (ref 8–23)
CALCIUM SERPL-MCNC: 9.3 MG/DL (ref 8.8–10.2)
CHLORIDE SERPL-SCNC: 105 MMOL/L (ref 98–107)
CREAT SERPL-MCNC: 0.69 MG/DL (ref 0.51–0.95)
DEPRECATED HCO3 PLAS-SCNC: 22 MMOL/L (ref 22–29)
ERYTHROCYTE [DISTWIDTH] IN BLOOD BY AUTOMATED COUNT: 14.2 % (ref 10–15)
FERRITIN SERPL-MCNC: 69 NG/ML (ref 11–328)
GFR SERPL CREATININE-BSD FRML MDRD: >90 ML/MIN/1.73M2
GLUCOSE SERPL-MCNC: 90 MG/DL (ref 70–99)
HCT VFR BLD AUTO: 52.8 % (ref 35–47)
HGB BLD-MCNC: 16.6 G/DL (ref 11.7–15.7)
IRON BINDING CAPACITY (ROCHE): 272 UG/DL (ref 240–430)
IRON SATN MFR SERPL: 36 % (ref 15–46)
IRON SERPL-MCNC: 97 UG/DL (ref 37–145)
MCH RBC QN AUTO: 29.5 PG (ref 26.5–33)
MCHC RBC AUTO-ENTMCNC: 31.4 G/DL (ref 31.5–36.5)
MCV RBC AUTO: 94 FL (ref 78–100)
PLATELET # BLD AUTO: 288 10E3/UL (ref 150–450)
POTASSIUM SERPL-SCNC: 4.6 MMOL/L (ref 3.4–5.3)
PROT SERPL-MCNC: 6.8 G/DL (ref 6.4–8.3)
RBC # BLD AUTO: 5.62 10E6/UL (ref 3.8–5.2)
SODIUM SERPL-SCNC: 139 MMOL/L (ref 136–145)
TSH SERPL DL<=0.005 MIU/L-ACNC: 1.8 UIU/ML (ref 0.3–4.2)
WBC # BLD AUTO: 6.2 10E3/UL (ref 4–11)

## 2023-06-15 PROCEDURE — 36415 COLL VENOUS BLD VENIPUNCTURE: CPT | Performed by: NURSE PRACTITIONER

## 2023-06-15 PROCEDURE — 84443 ASSAY THYROID STIM HORMONE: CPT | Performed by: NURSE PRACTITIONER

## 2023-06-15 PROCEDURE — 80053 COMPREHEN METABOLIC PANEL: CPT | Performed by: NURSE PRACTITIONER

## 2023-06-15 PROCEDURE — 83550 IRON BINDING TEST: CPT | Performed by: NURSE PRACTITIONER

## 2023-06-15 PROCEDURE — 99214 OFFICE O/P EST MOD 30 MIN: CPT | Performed by: NURSE PRACTITIONER

## 2023-06-15 PROCEDURE — 99207 VITAMIN D DEFICIENCY SCREENING: CPT | Performed by: NURSE PRACTITIONER

## 2023-06-15 PROCEDURE — 85027 COMPLETE CBC AUTOMATED: CPT | Performed by: NURSE PRACTITIONER

## 2023-06-15 PROCEDURE — 83540 ASSAY OF IRON: CPT | Performed by: NURSE PRACTITIONER

## 2023-06-15 PROCEDURE — 82728 ASSAY OF FERRITIN: CPT | Performed by: NURSE PRACTITIONER

## 2023-06-15 ASSESSMENT — PAIN SCALES - GENERAL: PAINLEVEL: MODERATE PAIN (5)

## 2023-06-15 NOTE — PROGRESS NOTES
"  Assessment & Plan     Hypothyroidism, unspecified type  Recheck today  - TSH with free T4 reflex; Future  - CBC with platelets; Future  - Ferritin; Future  - Iron and iron binding capacity; Future  - Vitamin D Deficiency; Future  - Comprehensive metabolic panel (BMP + Alb, Alk Phos, ALT, AST, Total. Bili, TP); Future    Morbid obesity (H)  Frustrated she has had no weight loss despite following Weight Watchers and walking.   Rec work on increasing protein, add weight bearing exercise. Will also be doing PT for back so this may help with new activity. She does not feel ready/interested in any weight loss meds. No hx of DM.   - TSH with free T4 reflex; Future  - CBC with platelets; Future  - Ferritin; Future  - Iron and iron binding capacity; Future  - Vitamin D Deficiency; Future  - Comprehensive metabolic panel (BMP + Alb, Alk Phos, ALT, AST, Total. Bili, TP); Future    Hyperlipidemia LDL goal <160  Improved with statin  - TSH with free T4 reflex; Future    Acute bilateral low back pain with left-sided sciatica  If not improving with PT to consider MRI and/or referral to spine  - Physical Therapy Referral; Future    Other fatigue  Start with baseline labs  - CBC with platelets; Future  - Ferritin; Future  - Iron and iron binding capacity; Future  - Vitamin D Deficiency; Future  - Comprehensive metabolic panel (BMP + Alb, Alk Phos, ALT, AST, Total. Bili, TP); Future    Iron deficiency anemia, unspecified iron deficiency anemia type  Taking PO iron  - CBC with platelets; Future  - Ferritin; Future  - Iron and iron binding capacity; Future  - Vitamin D Deficiency; Future  - Comprehensive metabolic panel (BMP + Alb, Alk Phos, ALT, AST, Total. Bili, TP); Future             BMI:   Estimated body mass index is 36.61 kg/m  as calculated from the following:    Height as of this encounter: 1.581 m (5' 2.25\").    Weight as of this encounter: 91.5 kg (201 lb 12.8 oz).   Weight management plan: Discussed healthy diet and " "exercise guidelines    Patient Instructions   Schedule with PT  Can use voltaren gel up to 4x per day (this is a topical anti-inflammatory)  Follow-up in 2 mos     Try to add weight bearing activity 1-2x per week  Add protein source with breakfast (eggs, yogurt, nuts)  Cut back cream in coffee if you think that's in excess, goal no more than 1 TBSP per cup of coffee    I will keep you posted on the labs       Renee Aldana NP  [facility] RAY Schmitt is a 67 year old, presenting for the following health issues:  Weight Problem        6/15/2023    10:23 AM   Additional Questions   Roomed by Shirley Brown CMA     History of Present Illness       Reason for visit:  Concern over weight gain and inability to lose any weight.    She eats 2-3 servings of fruits and vegetables daily.She consumes 0 sweetened beverage(s) daily.She exercises with enough effort to increase her heart rate 30 to 60 minutes per day.  She exercises with enough effort to increase her heart rate 6 days per week.   She is taking medications regularly.     Back Pain       Duration: \"a few months\"        Specific cause: none    Description:   Location of pain: low back bilateral  Character of pain: dull ache and constant  Pain radiation:radiates into the right leg and radiates into the left leg  New numbness or weakness in legs, not attributed to pain:  no     Intensity: Currently 5/10    History:   Pain interferes with job: Not applicable  History of back problems: sciatic problems \"sporadically\"  Any previous MRI or X-rays: None  Sees a specialist for back pain:  No  Therapies tried without relief: Aleve and rest    Alleviating factors:   Improved by: rest      Precipitating factors:  Worsened by: Standing and Walking    Onset about 1 mos ago  No fall or injury  Achy/stiff when going from sitting to standing  Or with walking will note pain all the way down her left leg  No loss of b/b control  Denies numbness, " "tingling or weakness of lower extremities    Accompanying Signs & Symptoms:  Risk of Fracture:  None  Risk of Cauda Equina:  None  Risk of Infection:  None  Risk of Cancer:  None  Risk of Ankylosing Spondylitis:  Onset at age <35, male, AND morning back stiffness. no         Wt Readings from Last 4 Encounters:   06/15/23 91.5 kg (201 lb 12.8 oz)   11/28/22 90 kg (198 lb 8 oz)   10/18/22 91.1 kg (200 lb 14.4 oz)   10/06/22 91.9 kg (202 lb 9.6 oz)     Had increased levothyroxine dose around Oct 2022, recheck in Jan 2023 was improved  Has been doing WW for at least 1 year and no weight loss  Tries to walk 2x per day, total of 1-2 miles/day  Breakfast-oatmeal or cheerios for breakfast  Lunch-not usually hungry, will do a piece of fruit  Dinner-chicken or fish with veggie  Coffee with cream 2-3 cups per day    Review of Systems         Objective    /78 (BP Location: Right arm, Cuff Size: Adult Large)   Pulse 84   Temp 98.6  F (37  C) (Tympanic)   Resp 18   Ht 1.581 m (5' 2.25\")   Wt 91.5 kg (201 lb 12.8 oz)   LMP 05/19/2008   SpO2 96%   BMI 36.61 kg/m    Body mass index is 36.61 kg/m .  Physical Exam     MSK: No obvious deformity of spine. No bruising, redness, or masses. Spine, SI joint, and paraspinal muscles non-tender to palpation. LE 5/5 strength.  NEURO: +2 DTRs of lower extremities.                      "

## 2023-06-16 DIAGNOSIS — R71.8 ELEVATED HEMATOCRIT: ICD-10-CM

## 2023-06-16 DIAGNOSIS — D58.2 ELEVATED HEMOGLOBIN (H): Primary | ICD-10-CM

## 2023-06-16 LAB — DEPRECATED CALCIDIOL+CALCIFEROL SERPL-MC: 31 UG/L (ref 20–75)

## 2023-06-30 ENCOUNTER — LAB (OUTPATIENT)
Dept: LAB | Facility: CLINIC | Age: 68
End: 2023-06-30
Payer: MEDICARE

## 2023-06-30 DIAGNOSIS — R71.8 ELEVATED HEMATOCRIT: ICD-10-CM

## 2023-06-30 DIAGNOSIS — D58.2 ELEVATED HEMOGLOBIN (H): ICD-10-CM

## 2023-06-30 LAB
BASOPHILS # BLD AUTO: 0 10E3/UL (ref 0–0.2)
BASOPHILS NFR BLD AUTO: 1 %
EOSINOPHIL # BLD AUTO: 0.1 10E3/UL (ref 0–0.7)
EOSINOPHIL NFR BLD AUTO: 2 %
ERYTHROCYTE [DISTWIDTH] IN BLOOD BY AUTOMATED COUNT: 14.2 % (ref 10–15)
HCT VFR BLD AUTO: 51.3 % (ref 35–47)
HGB BLD-MCNC: 16 G/DL (ref 11.7–15.7)
IMM GRANULOCYTES # BLD: 0 10E3/UL
IMM GRANULOCYTES NFR BLD: 0 %
LYMPHOCYTES # BLD AUTO: 1.3 10E3/UL (ref 0.8–5.3)
LYMPHOCYTES NFR BLD AUTO: 24 %
MCH RBC QN AUTO: 29.5 PG (ref 26.5–33)
MCHC RBC AUTO-ENTMCNC: 31.2 G/DL (ref 31.5–36.5)
MCV RBC AUTO: 95 FL (ref 78–100)
MONOCYTES # BLD AUTO: 0.5 10E3/UL (ref 0–1.3)
MONOCYTES NFR BLD AUTO: 9 %
NEUTROPHILS # BLD AUTO: 3.4 10E3/UL (ref 1.6–8.3)
NEUTROPHILS NFR BLD AUTO: 64 %
PLATELET # BLD AUTO: 290 10E3/UL (ref 150–450)
RBC # BLD AUTO: 5.42 10E6/UL (ref 3.8–5.2)
RETICS # AUTO: 0.09 10E6/UL (ref 0.03–0.1)
RETICS/RBC NFR AUTO: 1.8 % (ref 0.5–2)
WBC # BLD AUTO: 5.3 10E3/UL (ref 4–11)

## 2023-06-30 PROCEDURE — 85025 COMPLETE CBC W/AUTO DIFF WBC: CPT

## 2023-06-30 PROCEDURE — 85045 AUTOMATED RETICULOCYTE COUNT: CPT

## 2023-06-30 PROCEDURE — 99207 BLOOD MORPHOLOGY PATHOLOGIST REVIEW: CPT | Performed by: PATHOLOGY

## 2023-06-30 PROCEDURE — 36415 COLL VENOUS BLD VENIPUNCTURE: CPT

## 2023-07-03 DIAGNOSIS — D58.2 ELEVATED HEMOGLOBIN (H): Primary | ICD-10-CM

## 2023-07-03 LAB
PATH REPORT.COMMENTS IMP SPEC: NORMAL
PATH REPORT.COMMENTS IMP SPEC: NORMAL
PATH REPORT.FINAL DX SPEC: NORMAL
PATH REPORT.MICROSCOPIC SPEC OTHER STN: NORMAL
PATH REPORT.MICROSCOPIC SPEC OTHER STN: NORMAL

## 2023-07-24 ENCOUNTER — MYC MEDICAL ADVICE (OUTPATIENT)
Dept: PEDIATRICS | Facility: CLINIC | Age: 68
End: 2023-07-24

## 2023-07-24 ENCOUNTER — LAB (OUTPATIENT)
Dept: LAB | Facility: CLINIC | Age: 68
End: 2023-07-24
Payer: MEDICARE

## 2023-07-24 DIAGNOSIS — D58.2 ELEVATED HEMOGLOBIN (H): Primary | ICD-10-CM

## 2023-07-24 LAB
BASOPHILS # BLD AUTO: 0 10E3/UL (ref 0–0.2)
BASOPHILS NFR BLD AUTO: 1 %
EOSINOPHIL # BLD AUTO: 0.1 10E3/UL (ref 0–0.7)
EOSINOPHIL NFR BLD AUTO: 1 %
ERYTHROCYTE [DISTWIDTH] IN BLOOD BY AUTOMATED COUNT: 13.9 % (ref 10–15)
HCT VFR BLD AUTO: 50.7 % (ref 35–47)
HGB BLD-MCNC: 16.3 G/DL (ref 11.7–15.7)
IMM GRANULOCYTES # BLD: 0 10E3/UL
IMM GRANULOCYTES NFR BLD: 0 %
LYMPHOCYTES # BLD AUTO: 1.5 10E3/UL (ref 0.8–5.3)
LYMPHOCYTES NFR BLD AUTO: 17 %
MCH RBC QN AUTO: 30.2 PG (ref 26.5–33)
MCHC RBC AUTO-ENTMCNC: 32.1 G/DL (ref 31.5–36.5)
MCV RBC AUTO: 94 FL (ref 78–100)
MONOCYTES # BLD AUTO: 0.6 10E3/UL (ref 0–1.3)
MONOCYTES NFR BLD AUTO: 7 %
NEUTROPHILS # BLD AUTO: 6.2 10E3/UL (ref 1.6–8.3)
NEUTROPHILS NFR BLD AUTO: 73 %
PLATELET # BLD AUTO: 313 10E3/UL (ref 150–450)
RBC # BLD AUTO: 5.39 10E6/UL (ref 3.8–5.2)
WBC # BLD AUTO: 8.4 10E3/UL (ref 4–11)

## 2023-07-24 PROCEDURE — 36415 COLL VENOUS BLD VENIPUNCTURE: CPT

## 2023-07-24 PROCEDURE — 85025 COMPLETE CBC W/AUTO DIFF WBC: CPT

## 2023-07-24 NOTE — TELEPHONE ENCOUNTER
Patient sending MyChart as follow up on lab results from today. Please review and advise when able.     Jorge PAT RN 7/24/2023 at 1:23 PM

## 2023-07-25 ENCOUNTER — PATIENT OUTREACH (OUTPATIENT)
Dept: ONCOLOGY | Facility: CLINIC | Age: 68
End: 2023-07-25
Payer: MEDICARE

## 2023-07-25 NOTE — PROGRESS NOTES
Referral received for benign heme services, see below.    Referral reason: elevated hemoglobin    Current abnormal labs: Available in Chart Review    Outreach: Call not placed to patient regarding referral.    Plan: Triage instructions updated and sent to [facility] for completion.

## 2023-08-07 NOTE — TELEPHONE ENCOUNTER
Referring Provider/Location:  Renee Aldana  Dx and Code: Elevated hemoglobin (H) [D58.2]  Elevated hematocrit [R71.8]  Appt Date:  8.14.23  Provider: Toi Pastor  August 7, 2023 3:46 PM TJ   Internal Referral, No outside records needed      done

## 2023-08-14 ENCOUNTER — ONCOLOGY VISIT (OUTPATIENT)
Dept: ONCOLOGY | Facility: CLINIC | Age: 68
End: 2023-08-14
Attending: NURSE PRACTITIONER
Payer: MEDICARE

## 2023-08-14 ENCOUNTER — PRE VISIT (OUTPATIENT)
Dept: ONCOLOGY | Facility: CLINIC | Age: 68
End: 2023-08-14
Payer: MEDICARE

## 2023-08-14 VITALS
SYSTOLIC BLOOD PRESSURE: 139 MMHG | BODY MASS INDEX: 37.17 KG/M2 | DIASTOLIC BLOOD PRESSURE: 109 MMHG | WEIGHT: 202 LBS | RESPIRATION RATE: 16 BRPM | OXYGEN SATURATION: 97 % | HEART RATE: 95 BPM | HEIGHT: 62 IN

## 2023-08-14 DIAGNOSIS — D75.1 SECONDARY POLYCYTHEMIA: ICD-10-CM

## 2023-08-14 DIAGNOSIS — D58.2 ELEVATED HEMOGLOBIN (H): Primary | ICD-10-CM

## 2023-08-14 DIAGNOSIS — R71.8 ELEVATED HEMATOCRIT: ICD-10-CM

## 2023-08-14 LAB
ALBUMIN SERPL-MCNC: 3.8 G/DL (ref 3.5–5)
ALP SERPL-CCNC: 86 U/L (ref 45–120)
ALT SERPL W P-5'-P-CCNC: 17 U/L (ref 0–45)
ANION GAP SERPL CALCULATED.3IONS-SCNC: 11 MMOL/L (ref 5–18)
AST SERPL W P-5'-P-CCNC: 19 U/L (ref 0–40)
BASOPHILS # BLD AUTO: 0 10E3/UL (ref 0–0.2)
BASOPHILS NFR BLD AUTO: 1 %
BILIRUB SERPL-MCNC: 0.3 MG/DL (ref 0–1)
BUN SERPL-MCNC: 10 MG/DL (ref 8–22)
CALCIUM SERPL-MCNC: 9.4 MG/DL (ref 8.5–10.5)
CHLORIDE BLD-SCNC: 107 MMOL/L (ref 98–107)
CO2 SERPL-SCNC: 22 MMOL/L (ref 22–31)
CREAT SERPL-MCNC: 0.74 MG/DL (ref 0.6–1.1)
EOSINOPHIL # BLD AUTO: 0.1 10E3/UL (ref 0–0.7)
EOSINOPHIL NFR BLD AUTO: 2 %
ERYTHROCYTE [DISTWIDTH] IN BLOOD BY AUTOMATED COUNT: 14.5 % (ref 10–15)
GFR SERPL CREATININE-BSD FRML MDRD: 88 ML/MIN/1.73M2
GLUCOSE BLD-MCNC: 99 MG/DL (ref 70–125)
HCT VFR BLD AUTO: 49.4 % (ref 35–47)
HGB BLD-MCNC: 16.1 G/DL (ref 11.7–15.7)
IMM GRANULOCYTES # BLD: 0 10E3/UL
IMM GRANULOCYTES NFR BLD: 0 %
INTERPRETATION: NORMAL
LYMPHOCYTES # BLD AUTO: 1.5 10E3/UL (ref 0.8–5.3)
LYMPHOCYTES NFR BLD AUTO: 22 %
MCH RBC QN AUTO: 29.9 PG (ref 26.5–33)
MCHC RBC AUTO-ENTMCNC: 32.6 G/DL (ref 31.5–36.5)
MCV RBC AUTO: 92 FL (ref 78–100)
MONOCYTES # BLD AUTO: 0.4 10E3/UL (ref 0–1.3)
MONOCYTES NFR BLD AUTO: 6 %
NEUTROPHILS # BLD AUTO: 4.6 10E3/UL (ref 1.6–8.3)
NEUTROPHILS NFR BLD AUTO: 69 %
NRBC # BLD AUTO: 0 10E3/UL
NRBC BLD AUTO-RTO: 0 /100
PLATELET # BLD AUTO: 299 10E3/UL (ref 150–450)
POTASSIUM BLD-SCNC: 4.2 MMOL/L (ref 3.5–5)
PROT SERPL-MCNC: 7 G/DL (ref 6–8)
RBC # BLD AUTO: 5.38 10E6/UL (ref 3.8–5.2)
SIGNIFICANT RESULTS: NORMAL
SODIUM SERPL-SCNC: 140 MMOL/L (ref 136–145)
SPECIMEN DESCRIPTION: NORMAL
TEST DETAILS, MDL: NORMAL
WBC # BLD AUTO: 6.7 10E3/UL (ref 4–11)

## 2023-08-14 PROCEDURE — G0463 HOSPITAL OUTPT CLINIC VISIT: HCPCS | Performed by: INTERNAL MEDICINE

## 2023-08-14 PROCEDURE — 99204 OFFICE O/P NEW MOD 45 MIN: CPT | Performed by: INTERNAL MEDICINE

## 2023-08-14 PROCEDURE — 81270 JAK2 GENE: CPT | Performed by: INTERNAL MEDICINE

## 2023-08-14 PROCEDURE — 80053 COMPREHEN METABOLIC PANEL: CPT | Performed by: INTERNAL MEDICINE

## 2023-08-14 PROCEDURE — 36415 COLL VENOUS BLD VENIPUNCTURE: CPT | Performed by: INTERNAL MEDICINE

## 2023-08-14 PROCEDURE — 82668 ASSAY OF ERYTHROPOIETIN: CPT | Performed by: INTERNAL MEDICINE

## 2023-08-14 PROCEDURE — 81339 MPL GENE SEQ ALYS EXON 10: CPT | Performed by: INTERNAL MEDICINE

## 2023-08-14 PROCEDURE — G0452 MOLECULAR PATHOLOGY INTERPR: HCPCS | Mod: 26 | Performed by: PATHOLOGY

## 2023-08-14 PROCEDURE — 85025 COMPLETE CBC W/AUTO DIFF WBC: CPT | Performed by: INTERNAL MEDICINE

## 2023-08-14 ASSESSMENT — PAIN SCALES - GENERAL: PAINLEVEL: NO PAIN (0)

## 2023-08-14 NOTE — LETTER
"    8/14/2023         RE: Vanessa Mckeon  [address]  [place] [address]        Dear Colleague,    Thank you for referring your patient, Vanessa Mckeon, to the [facility]. Please see a copy of my visit note below.    Oncology Rooming Note    August 14, 2023 1:34 PM   Vanessa Mckeon is a 67 year old female who presents for:    Chief Complaint   Patient presents with     Hematology     New consult elevated hemoglobin      Initial Vitals: BP (!) 139/109   Pulse 95   Resp 16   Ht 1.581 m (5' 2.25\")   Wt 91.6 kg (202 lb)   LMP 05/19/2008   SpO2 97%   BMI 36.65 kg/m   Estimated body mass index is 36.65 kg/m  as calculated from the following:    Height as of this encounter: 1.581 m (5' 2.25\").    Weight as of this encounter: 91.6 kg (202 lb). Body surface area is 2.01 meters squared.  No Pain (0) Comment: Data Unavailable   Patient's last menstrual period was 05/19/2008.  Allergies reviewed: Yes  Medications reviewed: Yes    Medications: Medication refills not needed today.  Pharmacy name entered into [facility]: [facility] [place], [place] - [address] [place] [address]     Clinical concerns:  new consult elevated hemoglobin      Naomie Lindquist              [facility] Hematology and Oncology Consult Note      Patient: Vanessa Mckeon  MRN: [id number]  Date of Service: Aug 14, 2023      We have been asked by ALON Aldana NP to evaluate Vanessa Mckeon for erythrocytosis.    Assessment/Plan:    1.  Erythrocytosis: Chronic and stable since June 2019.  Her CBC from today is reviewed and shows a white count of 6.7, hemoglobin 16.1, mean cell volume 92 and platelets 299.  White blood cell count and platelet count are normal.  Probably secondary in nature.  Today we will check erythropoietin levels in the serum as well as JAK2/CALR/MPL mutation status to rule out a myeloproliferative disorder.  She does not have any signs or symptoms of polycythemia " vera.  Chronic hypoxemia from obstructive sleep apnea seems like a likely cause of her erythrocytosis.  If she is found to have polycythemia vera then we would have to start her on phlebotomy and possibly Hydrea.  If this is found to be secondary erythrocytosis no treatment will be needed.  Hemoglobin in the 16 range should not be a cause of her fatigue.    ECOG Performance  0    Staging History:    Cancer Staging   No matching staging information was found for the patient.    History:    Keshia is a 67-year-old woman who is referred today for evaluation of erythrocytosis.  Upon review of her medical record, she has had an elevated hemoglobin since June 2019.  This is the first time she has had an evaluation of her elevated hemoglobin.  Clinically she has been feeling okay lately.  She denies any problems with skin rash, itching, early satiety, fevers, chills, night sweats.  No acute complaints today.    Past History:  Past Medical History:   Diagnosis Date     Anemia      Arthritis     Aleve occasionally     Benign neoplasm of colon      Cervical high risk HPV (human papillomavirus) test positive 10/02/2017 & 10/9/2018 & 10/24/2019    10/2/17 NIL, +HR HPV, not 16/18     Gastro-oesophageal reflux disease      History of blood transfusion     Iron deficiency     Hypertrophy of breast     fibrous left breast- benign     Malignant neoplasm (H)      Mild dysplasia of cervix 12/03/2019     Pure hypercholesterolemia      Skin cancer      Thyroid disease     Levothyroxine    Family History   Problem Relation Age of Onset     Diabetes Father         adult     Neurologic Disorder Father         parkinsons     Cancer Father 95        lung     Heart Disease Father      C.A.D. 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 No   Social History Narrative    Dairy/d 3-4 servings/d    Caffeine 5 servings/d    Exercise 4-5 x week    Sunscreen used - Yes    Seatbelts used - Yes    Working smoke/CO detectors in the home - Yes    Guns stored in the home - No    Self Breast Exams - Yes    Self Testicular Exam - NOT APPLICABLE    Eye Exam up to date - no,will make appt    Dental Exam up to date - Yes-this past Sat    Pap Smear up to date - no    Mammogram up to date - 2009,new ins,will be getting another mammo soon    PSA up to date - NOT APPLICABLE    Dexa Scan up to date - NOT APPLICABLE    Flex Sig / Colonoscopy up to date - Yes , repeat in 5 yrs    Immunizations up to date - Yes-Td     Abuse: Current or Past(Physical, Sexual or Emotional)- No    Do you feel safe in your environment - Yes                         Social Determinants of Health     Financial Resource Strain: Low Risk  (2022)    Overall Financial Resource Strain (CARDIA)      Difficulty of Paying Living Expenses: Not very hard   Food Insecurity: No Food Insecurity (2022)    Hunger Vital Sign      Worried  About Running Out of Food in the Last Year: Never true      Ran Out of Food in the Last Year: Never true   Transportation Needs: No Transportation Needs (11/28/2022)    PRAPARE - Transportation      Lack of Transportation (Medical): No      Lack of Transportation (Non-Medical): No   Physical Activity: Insufficiently Active (11/28/2022)    Exercise Vital Sign      Days of Exercise per Week: 3 days      Minutes of Exercise per Session: 30 min   Stress: No Stress Concern Present (11/28/2022)    [employer] - Occupational Stress Questionnaire      Feeling of Stress : Not at all   Social Connections: Moderately Integrated (11/28/2022)    Social Connection and Isolation Panel [NHANES]      Frequency of Communication with Friends and Family: More than three times a week      Frequency of Social Gatherings with Friends and Family: Once a week      Attends [employer] Services: More than 4 times per year      Active Member of Clubs or Organizations: No      Attends Club or Organization Meetings: Not on file      Marital Status:    Intimate Partner Violence: Not on file   Housing Stability: Low Risk  (11/28/2022)    Housing Stability Vital Sign      Unable to Pay for Housing in the Last Year: No      Number of Places Lived in the Last Year: 1      Unstable Housing in the Last Year: No        Allergies:    Allergies   Allergen Reactions     Influenza Virus Vaccine      Hypertension,flushing  Other reaction(s): Hypertension  Hypertension,flushing     Review of Systems:    As above in the history.     Review of Systems otherwise Negative for:  General: chills, fever or night sweats  Psychological: anxiety or depression  Ophthalmic: blurry vision, double vision or loss of vision, vision change  ENT: epistaxis, oral lesions, hearing changes  Hematological and Lymphatic: bleeding, bruising, jaundice, swollen lymph nodes  Endocrine: hot flashes, unexpected weight changes  Respiratory: cough,  "hemoptysis, orthopnea or shortness of breath/RUEDA  Cardiovascular: chest pain, edema, palpitations or PND  Gastrointestinal: abdominal pain, blood in stools, change in bowel habits, constipation, diarrhea or nausea/vomiting  Genito-Urinary: change in urinary stream, incontinence, frequency/urgency  Musculoskeletal: joint pain, stiffness, swelling, muscle pain  Neurological: dizziness, headaches, numbness/tingling  Dermatological: lumps and rash    Physical Exam:    BP (!) 139/109   Pulse 95   Resp 16   Ht 1.581 m (5' 2.25\")   Wt 91.6 kg (202 lb)   LMP 05/19/2008   SpO2 97%   BMI 36.65 kg/m      General: patient appears stated age of 67 year old. Nontoxic and in no distress.   HEENT: Head: atraumatic, normocephalic. Sclerae anicteric.  Chest:  Normal respiratory effort  Cardiac:  No edema.   Abdomen: abdomen is non-distended  Extremities: normal tone and muscle bulk.   Skin: no lesions or rash on visible skin. Warm and dry.   CNS: alert and oriented. Grossly non-focal.   Psychiatric: normal mood and affect.     Lab Results:    No results found for this or any previous visit (from the past 168 hour(s)).    Imaging Results:    No results found.      Signed by: Toi Pastor MD    Again, thank you for allowing me to participate in the care of your patient.        Sincerely,        Toi Pastor MD  "

## 2023-08-14 NOTE — PROGRESS NOTES
[facility] Hematology and Oncology Consult Note      Patient: Vanessa Mckeon  MRN: [id number]  Date of Service: Aug 14, 2023      We have been asked by ALON Aldana NP to evaluate Vanessa Mckeon for erythrocytosis.    Assessment/Plan:    1.  Erythrocytosis: Chronic and stable since June 2019.  Her CBC from today is reviewed and shows a white count of 6.7, hemoglobin 16.1, mean cell volume 92 and platelets 299.  White blood cell count and platelet count are normal.  Probably secondary in nature.  Today we will check erythropoietin levels in the serum as well as JAK2/CALR/MPL mutation status to rule out a myeloproliferative disorder.  She does not have any signs or symptoms of polycythemia vera.  Chronic hypoxemia from obstructive sleep apnea seems like a likely cause of her erythrocytosis.  If she is found to have polycythemia vera then we would have to start her on phlebotomy and possibly Hydrea.  If this is found to be secondary erythrocytosis no treatment will be needed.  Hemoglobin in the 16 range should not be a cause of her fatigue.    ECOG Performance  0    Staging History:    Cancer Staging   No matching staging information was found for the patient.    History:    Keshia is a 67-year-old woman who is referred today for evaluation of erythrocytosis.  Upon review of her medical record, she has had an elevated hemoglobin since June 2019.  This is the first time she has had an evaluation of her elevated hemoglobin.  Clinically she has been feeling okay lately.  She denies any problems with skin rash, itching, early satiety, fevers, chills, night sweats.  No acute complaints today.    Past History:  Past Medical History:   Diagnosis Date    Anemia     Arthritis     Aleve occasionally    Benign neoplasm of colon     Cervical high risk HPV (human papillomavirus) test positive 10/02/2017 & 10/9/2018 & 10/24/2019    10/2/17 NIL, +HR HPV, not 16/18    Gastro-oesophageal reflux disease     History of blood  transfusion     Iron deficiency    Hypertrophy of breast     fibrous left breast- benign    Malignant neoplasm (H)     Mild dysplasia of cervix 2019    Pure hypercholesterolemia     Skin cancer     Thyroid disease     Levothyroxine    Family History   Problem Relation Age of Onset    Diabetes Father         adult    Neurologic Disorder Father         parkinsons    Cancer Father 95        lung    Heart Disease Father     C.A.D. 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 No   Social History Narrative    Dairy/d 3-4 servings/d    Caffeine 5 servings/d    Exercise 4-5 x week    Sunscreen used - Yes    Seatbelts used - Yes    Working smoke/CO detectors in the home - Yes    Guns stored in the home - No    Self Breast Exams - Yes    Self Testicular Exam - NOT APPLICABLE    Eye Exam up to date - no,will make appt    Dental Exam up to date - Yes-this past Sat    Pap Smear up to date -  no    Mammogram up to date - June 8 2009,new ins,will be getting another mammo soon    PSA up to date - NOT APPLICABLE    Dexa Scan up to date - NOT APPLICABLE    Flex Sig / Colonoscopy up to date - Yes 2006, repeat in 5 yrs    Immunizations up to date - Yes-Td 2006    Abuse: Current or Past(Physical, Sexual or Emotional)- No    Do you feel safe in your environment - Yes                         Social Determinants of Health     Financial Resource Strain: Low Risk  (11/28/2022)    Overall Financial Resource Strain (CARDIA)     Difficulty of Paying Living Expenses: Not very hard   Food Insecurity: No Food Insecurity (11/28/2022)    Hunger Vital Sign     Worried About Running Out of Food in the Last Year: Never true     Ran Out of Food in the Last Year: Never true   Transportation Needs: No Transportation Needs (11/28/2022)    PRAPARE - Transportation     Lack of Transportation (Medical): No     Lack of Transportation (Non-Medical): No   Physical Activity: Insufficiently Active (11/28/2022)    Exercise Vital Sign     Days of Exercise per Week: 3 days     Minutes of Exercise per Session: 30 min   Stress: No Stress Concern Present (11/28/2022)    [employer] - Occupational Stress Questionnaire     Feeling of Stress : Not at all   Social Connections: Moderately Integrated (11/28/2022)    Social Connection and Isolation Panel [NHANES]     Frequency of Communication with Friends and Family: More than three times a week     Frequency of Social Gatherings with Friends and Family: Once a week     Attends [employer] Services: More than 4 times per year     Active Member of Clubs or Organizations: No     Attends Club or Organization Meetings: Not on file     Marital Status:    Intimate Partner Violence: Not on file   Housing Stability: Low Risk  (11/28/2022)    Housing Stability Vital Sign     Unable to Pay for Housing in the Last Year: No     Number of Places Lived in the Last Year: 1      "Unstable Housing in the Last Year: No        Allergies:    Allergies   Allergen Reactions    Influenza Virus Vaccine      Hypertension,flushing  Other reaction(s): Hypertension  Hypertension,flushing     Review of Systems:    As above in the history.     Review of Systems otherwise Negative for:  General: chills, fever or night sweats  Psychological: anxiety or depression  Ophthalmic: blurry vision, double vision or loss of vision, vision change  ENT: epistaxis, oral lesions, hearing changes  Hematological and Lymphatic: bleeding, bruising, jaundice, swollen lymph nodes  Endocrine: hot flashes, unexpected weight changes  Respiratory: cough, hemoptysis, orthopnea or shortness of breath/RUEDA  Cardiovascular: chest pain, edema, palpitations or PND  Gastrointestinal: abdominal pain, blood in stools, change in bowel habits, constipation, diarrhea or nausea/vomiting  Genito-Urinary: change in urinary stream, incontinence, frequency/urgency  Musculoskeletal: joint pain, stiffness, swelling, muscle pain  Neurological: dizziness, headaches, numbness/tingling  Dermatological: lumps and rash    Physical Exam:    BP (!) 139/109   Pulse 95   Resp 16   Ht 1.581 m (5' 2.25\")   Wt 91.6 kg (202 lb)   LMP 05/19/2008   SpO2 97%   BMI 36.65 kg/m      General: patient appears stated age of 67 year old. Nontoxic and in no distress.   HEENT: Head: atraumatic, normocephalic. Sclerae anicteric.  Chest:  Normal respiratory effort  Cardiac:  No edema.   Abdomen: abdomen is non-distended  Extremities: normal tone and muscle bulk.   Skin: no lesions or rash on visible skin. Warm and dry.   CNS: alert and oriented. Grossly non-focal.   Psychiatric: normal mood and affect.     Lab Results:    No results found for this or any previous visit (from the past 168 hour(s)).    Imaging Results:    No results found.      Signed by: Toi Pasotr MD    "

## 2023-08-14 NOTE — PROGRESS NOTES
"Oncology Rooming Note    August 14, 2023 1:34 PM   Vanessa Mckeon is a 67 year old female who presents for:    Chief Complaint   Patient presents with    Hematology     New consult elevated hemoglobin      Initial Vitals: BP (!) 139/109   Pulse 95   Resp 16   Ht 1.581 m (5' 2.25\")   Wt 91.6 kg (202 lb)   LMP 05/19/2008   SpO2 97%   BMI 36.65 kg/m   Estimated body mass index is 36.65 kg/m  as calculated from the following:    Height as of this encounter: 1.581 m (5' 2.25\").    Weight as of this encounter: 91.6 kg (202 lb). Body surface area is 2.01 meters squared.  No Pain (0) Comment: Data Unavailable   Patient's last menstrual period was 05/19/2008.  Allergies reviewed: Yes  Medications reviewed: Yes    Medications: Medication refills not needed today.  Pharmacy name entered into [facility]: [facility] - [address] [facility]     Clinical concerns:  new consult elevated hemoglobin      Naomie Lindquist"

## 2023-08-16 LAB — EPO SERPL-ACNC: 8 MU/ML

## 2023-08-21 ENCOUNTER — MYC MEDICAL ADVICE (OUTPATIENT)
Dept: PEDIATRICS | Facility: CLINIC | Age: 68
End: 2023-08-21
Payer: MEDICARE

## 2023-08-21 LAB
INTERPRETATION: NORMAL
SIGNIFICANT RESULTS: NORMAL
SPECIMEN DESCRIPTION: NORMAL
TEST DETAILS, MDL: NORMAL

## 2023-10-22 NOTE — PROGRESS NOTES
Medication Therapy Management (MTM) Encounter    ASSESSMENT:                            Medication Adherence/Access: No issues identified    Elevated blood pressure: due to recheck blood pressure    Obesity: Weight plateau. Patient would benefit from considering weight loss medications. Previously discussed at last visit.  Candidates for drug therapy include individuals with BMI ?30 kg/m2 who have not met weight-loss goals (loss ? 5% at three to six months) with a comprehensive lifestyle intervention alone. Based on patients BMI of 35.5 kg/m2 and sustained lifestyle interventions (improved diet & physical activity) the addition of weight loss medication would be appropriate. She will consider this and discuss at next provider visit.    Hypothyroidism:   Stable. Last TSH is within normal limits.     Hyperlipidemia:   Stable.  Patient is on moderate intensity statin which is indicated based on 2019 ACC/AHA guidelines for lipid management.      Allergic rhinitis:   Stable.    Supplements/Iron Deficiency Anemia: Stable.      Vaccines: Due for COVID-19 Tdap per ACIP/CDC Guidelines: Discussed receiving vaccines at local pharmacy, patient is is agreeable to plan.     PLAN:                            Weight management:  If blood pressure is better consider: Phentermine and topiramate or if worried about heart rate or blood pressure consider naltrexone + bupropion for helping to lose weight.    Vaccines:   We set up an appointment at the [facility] to get your Tdap, COVID vaccine and blood pressure check.  You will get an email on this appointment.    Follow-up: 2/12/2024 Tatiana Leahy CNP Return in about 1 year (around 10/23/2024) for MTM Pharmacist Visit, phone visit.    SUBJECTIVE/OBJECTIVE:                          Keshia Mckeon is a 68 year old female called for a follow-up visit from 10/18/22.       Reason for visit: no concerns.    Allergies/ADRs: Reviewed in chart  Past Medical History: Reviewed in  "chart  Tobacco: She reports that she quit smoking about 20 years ago. Her smoking use included cigarettes. She has a 5.00 pack-year smoking history. She has never used smokeless tobacco.  Alcohol: rarely, twice per year   Caffeine: black coffee 4 cups/day  Personal Healthcare Goals: Weight loss to improve general& cardiovascular health.    Medication Adherence/Access:   No forgetting to take, no missed doses, takes out of bottles  Medication barriers: cost, pt has Medicare coverage.   The patient fills medications at [facility]: YES. [facility] MICHELLE Sim     Weight Management/Obesity: Current medication(s) include: None. Reports she has focused on diet and exercise with limited weight benefits. Goal is to lose 60 pounds for cardiovascular and general health benefits.   Nutrition/Eating Habits: Patient reports not using weight watchers right now, did in the past and dropped 60 lbs, also tried Danielle Daniel.  She is eating fruits and veggies.  She 1200 calories per day  Exercise/Activity: Walks minimum of 1 mile per day, takes ~half hour.   Failed medications include: None.     Wt Readings from Last 4 Encounters:   08/14/23 202 lb (91.6 kg)   06/15/23 201 lb 12.8 oz (91.5 kg)   11/28/22 198 lb 8 oz (90 kg)   10/18/22 200 lb 14.4 oz (91.1 kg)     Wt Readings from Last 10 Encounters:   08/14/23 202 lb (91.6 kg)   06/15/23 201 lb 12.8 oz (91.5 kg)   11/28/22 198 lb 8 oz (90 kg)   10/18/22 200 lb 14.4 oz (91.1 kg)   10/06/22 202 lb 9.6 oz (91.9 kg)   08/09/22 197 lb 9.6 oz (89.6 kg)   08/04/22 198 lb 6.4 oz (90 kg)   07/26/22 194 lb 6.4 oz (88.2 kg)   07/13/22 197 lb (89.4 kg)   12/12/21 198 lb 11.2 oz (90.1 kg)     Estimated body mass index is 36.65 kg/m  as calculated from the following:    Height as of 8/14/23: 5' 2.25\" (1.581 m).    Weight as of 8/14/23: 202 lb (91.6 kg).    Lab Results   Component Value Date    A1C 5.6 10/18/2022     Hypothyroidism: Patient is taking levothyroxine 100mcg daily. Patient is having " the following symptoms: reports not feeling fatigue, continues to struggle with weight.  TSH   Date Value Ref Range Status   06/15/2023 1.80 0.30 - 4.20 uIU/mL Final   10/18/2022 4.69 (H) 0.40 - 4.00 mU/L Final   10/19/2020 2.04 0.40 - 4.00 mU/L Final     T4 Free   Date Value Ref Range Status   06/26/2020 1.05 0.76 - 1.46 ng/dL Final     Free T4   Date Value Ref Range Status   10/18/2022 1.04 0.76 - 1.46 ng/dL Final       Hyperlipidemia:  Atorvastatin 10mg every day    Reports both mother and father had heart issues. Father had triple bypass surgery in his 70s. Wants to ensure she is doing what she can to prevent cardiovascular event.    The 10-year ASCVD risk score (Zoe LEIVA, et al., 2019) is: 8.4%    Values used to calculate the score:      Age: 68 years      Sex: Female      Is Non- : No      Diabetic: No      Tobacco smoker: No      Systolic Blood Pressure: 139 mmHg      Is BP treated: No      HDL Cholesterol: 52 mg/dL      Total Cholesterol: 161 mg/dL    Recent Labs   Lab Test 01/28/23  0954 10/18/22  0938 09/27/16  0803 09/22/15  0944   CHOL 161 279*   < > 240*   HDL 52 56   < > 53   LDL 85 184*   < > 157*   TRIG 122 197*   < > 150   CHOLHDLRATIO  --   --   --  4.5    < > = values in this interval not displayed.     She reports her blood pressure goes up with appointments this was from Oncology visit  BP Readings from Last 3 Encounters:   08/14/23 (!) 139/109   06/15/23 116/78   11/28/22 120/74     Pulse Readings from Last 3 Encounters:   08/14/23 95   06/15/23 84   11/28/22 84        Allergic Rhinitis: Current medications include Flonase 1-2 sprays in each nostril daily. Patient is not experiencing side effects.  Primary triggers are seasonal allergens (fall and spring). Patient feels that current therapy is effective.     Supplements/Iron Deficiency Anemia:   Multivitamin tablet daily  Iron complex daily.   No reported issues at this time.   Ferritin   Date Value Ref Range Status    06/15/2023 69 11 - 328 ng/mL Final   09/08/2011 31 10 - 300 ng/mL Final     Iron   Date Value Ref Range Status   06/15/2023 97 37 - 145 ug/dL Final   09/27/2016 66 35 - 180 ug/dL Final     Iron Binding Cap   Date Value Ref Range Status   09/27/2016 291 240 - 430 ug/dL Final     Iron Binding Capacity   Date Value Ref Range Status   06/15/2023 272 240 - 430 ug/dL Final     Hemoglobin   Date Value Ref Range Status   08/14/2023 16.1 (H) 11.7 - 15.7 g/dL Final   06/05/2019 16.1 (H) 11.7 - 15.7 g/dL Final   ]    Vaccines:   Immunization History   Administered Date(s) Administered    COVID-19 Bivalent 12+ (Pfizer) 10/12/2022    COVID-19 MONOVALENT 12+ (Pfizer) 02/22/2021, 03/15/2021, 11/22/2021    COVID-19 Monovalent 12+ (Pfizer 2022) 04/22/2022    Flu 65+ Years 09/30/2022    Influenza Vaccine 65+ (FLUAD) 09/24/2021    Influenza Vaccine 65+ (Fluzone HD) 09/30/2022    Influenza Vaccine >6 months (Alfuria,Fluzone) 10/26/2020, 09/24/2021    Pneumo Conj 13-V (2010&after) 10/26/2020    Pneumococcal 23 valent 11/22/2021    TD,PF 7+ (Tenivac) 04/10/2006    TDAP Vaccine (Adacel) 07/01/2013    Tetanus 03/12/1993    Zoster recombinant adjuvanted (SHINGRIX) 11/05/2019, 05/29/2020    Zoster vaccine, live 12/21/2016     Today's Vitals: LMP 05/19/2008   ----------------    I spent 25 minutes with this patient today. All changes were made via collaborative practice agreement with LENARD Higuera CNP. A copy of the visit note was provided to the patient's provider(s).    A summary of these recommendations was sent via [employer].    Madelaine Lutz PharmD BCPS  Medication Therapy Management Practitioner  [facility] #[phone]       Telemedicine Visit Details  Type of service:  Telephone visit  Start Time:  10:06am  End Time: 10:31 AM       Medication Therapy Recommendations  Vaccine counseling    Rationale: Preventive therapy - Needs additional medication therapy - Indication   Recommendation: Start Medication - Tdap  (tetanus-diphtheria-acell pertussis) 5-2-15.5 LF-MCG/0.5 injection   Status: Resolved Med Access Issue

## 2023-10-23 ENCOUNTER — VIRTUAL VISIT (OUTPATIENT)
Dept: PHARMACY | Facility: CLINIC | Age: 68
End: 2023-10-23
Payer: OTHER GOVERNMENT

## 2023-10-23 DIAGNOSIS — Z78.9 TAKES DIETARY SUPPLEMENTS: ICD-10-CM

## 2023-10-23 DIAGNOSIS — E78.5 HYPERLIPIDEMIA LDL GOAL <160: ICD-10-CM

## 2023-10-23 DIAGNOSIS — E66.01 MORBID OBESITY (H): Primary | ICD-10-CM

## 2023-10-23 DIAGNOSIS — Z71.85 VACCINE COUNSELING: ICD-10-CM

## 2023-10-23 DIAGNOSIS — J30.2 SEASONAL ALLERGIC RHINITIS, UNSPECIFIED TRIGGER: ICD-10-CM

## 2023-10-23 DIAGNOSIS — R03.0 ELEVATED BP WITHOUT DIAGNOSIS OF HYPERTENSION: ICD-10-CM

## 2023-10-23 DIAGNOSIS — D50.9 IRON DEFICIENCY ANEMIA, UNSPECIFIED IRON DEFICIENCY ANEMIA TYPE: ICD-10-CM

## 2023-10-23 DIAGNOSIS — E03.9 HYPOTHYROIDISM, UNSPECIFIED TYPE: ICD-10-CM

## 2023-10-23 PROCEDURE — 99207 PR NO CHARGE LOS: CPT | Performed by: PHARMACIST

## 2023-10-23 RX ORDER — FERROUS SULFATE 325(65) MG
325 TABLET ORAL DAILY
COMMUNITY
End: 2024-01-03

## 2023-10-23 NOTE — PATIENT INSTRUCTIONS
Recommendations from today's MTM visit:                                                      Weight management:  If blood pressure is better consider: Phentermine and topiramate or if worried about heart rate or blood pressure consider naltrexone + bupropion for helping to lose weight.    Vaccines:   We set up an appointment at the [facility] to get your Tdap, COVID vaccine and blood pressure check.  You will get an email on this appointment.    Follow-up: 2/12/2024 Tatiana Leahy CNP Return in about 1 year (around 10/23/2024) for MTM Pharmacist Visit, phone visit.    To schedule another MTM appointment, please call the clinic directly or you may call the MTM scheduling line at [phone] or toll-free at [phone].     My Clinical Pharmacist's contact information:                                                      It was a pleasure seeing you today!  Please feel free to contact me with any questions or concerns you have.      Madelaine Lutz, PharmD [facility]  Medication Therapy Management Practitioner  Vo[employer] #[phone]     It was great to speak with you today.  I value your experience and would be very thankful for your time with providing feedback on our clinic survey. You may receive a survey via email or text message in the next few days.

## 2023-11-12 DIAGNOSIS — D75.1 SECONDARY ERYTHROCYTOSIS: Primary | ICD-10-CM

## 2023-11-28 DIAGNOSIS — E78.5 HYPERLIPIDEMIA LDL GOAL <160: ICD-10-CM

## 2023-11-28 RX ORDER — ATORVASTATIN CALCIUM 10 MG/1
10 TABLET, FILM COATED ORAL DAILY
Qty: 90 TABLET | Refills: 0 | Status: SHIPPED | OUTPATIENT
Start: 2023-11-28 | End: 2024-03-06

## 2023-11-29 ENCOUNTER — ANCILLARY PROCEDURE (OUTPATIENT)
Dept: MAMMOGRAPHY | Facility: CLINIC | Age: 68
End: 2023-11-29
Attending: NURSE PRACTITIONER
Payer: MEDICARE

## 2023-11-29 DIAGNOSIS — Z12.31 VISIT FOR SCREENING MAMMOGRAM: ICD-10-CM

## 2023-11-29 PROCEDURE — 77067 SCR MAMMO BI INCL CAD: CPT | Mod: TC | Performed by: RADIOLOGY

## 2023-11-29 PROCEDURE — 77063 BREAST TOMOSYNTHESIS BI: CPT | Mod: TC | Performed by: RADIOLOGY

## 2023-12-16 ENCOUNTER — OFFICE VISIT (OUTPATIENT)
Dept: URGENT CARE | Facility: URGENT CARE | Age: 68
End: 2023-12-16
Payer: MEDICARE

## 2023-12-16 VITALS
OXYGEN SATURATION: 96 % | SYSTOLIC BLOOD PRESSURE: 126 MMHG | DIASTOLIC BLOOD PRESSURE: 83 MMHG | TEMPERATURE: 97.6 F | BODY MASS INDEX: 37.01 KG/M2 | WEIGHT: 204 LBS | HEART RATE: 87 BPM | RESPIRATION RATE: 16 BRPM

## 2023-12-16 DIAGNOSIS — R05.1 ACUTE COUGH: Primary | ICD-10-CM

## 2023-12-16 PROCEDURE — 87635 SARS-COV-2 COVID-19 AMP PRB: CPT | Performed by: FAMILY MEDICINE

## 2023-12-16 PROCEDURE — 99213 OFFICE O/P EST LOW 20 MIN: CPT | Performed by: FAMILY MEDICINE

## 2023-12-16 NOTE — PROGRESS NOTES
SUBJECTIVE:   Vanessa Mckeon is a 68 year old female --her  tested positive for COVID-19 three days ago--presenting with a chief complaint of nasal congestion, sneezing, cough (sporadic nonproductive cough), mild loss of smell.  .  Onset of symptoms was three days ago.  Her  tested positive for COVID-19 three days ago.    Current and Associated symptoms:   No fevers  No shortness of breath  There have been headaches at the bilateral forehead.   No vomiting/diarrhea/abdominal pain  No bluish lips/toes/fingers.   Not much sore throat.   Treatment measures tried include Aleve, Rest, Drinking a lot of liquids.  .  Predisposing factors include:.  Her at-home COVID-19 test yesterday was negative.  Patient had also done another at-home COVID-19 before yesterday's test.  It was also negative.     Past Medical History:   Diagnosis Date    Anemia     Arthritis     Aleve occasionally    Benign neoplasm of colon     Cervical high risk HPV (human papillomavirus) test positive 10/02/2017 & 10/9/2018 & 10/24/2019    10/2/17 NIL, +HR HPV, not 16/18    Gastro-oesophageal reflux disease     History of blood transfusion     Iron deficiency    Hypertrophy of breast     fibrous left breast- benign    Malignant neoplasm (H)     Mild dysplasia of cervix 12/03/2019    Pure hypercholesterolemia     Skin cancer     Thyroid disease     Levothyroxine     Current Outpatient Medications   Medication Sig Dispense Refill    atorvastatin (LIPITOR) 10 MG tablet TAKE ONE TABLET BY MOUTH ONCE DAILY 90 tablet 0    ferrous sulfate (FEROSUL) 325 (65 Fe) MG tablet Take 325 mg by mouth daily      fluticasone (FLONASE) 50 MCG/ACT nasal spray Spray 2 sprays into both nostrils daily 1 Package 2    levothyroxine (SYNTHROID/LEVOTHROID) 100 MCG tablet Take 1 tablet (100 mcg) by mouth daily 90 tablet 4    Multiple Vitamins-Minerals (MULTIVITAMIN WOMEN 50+) TABS Take 1 tablet by mouth daily       Social History     Tobacco Use    Smoking status:  Former     Packs/day: 0.50     Years: 10.00     Additional pack years: 0.00     Total pack years: 5.00     Types: Cigarettes     Quit date: 2003     Years since quittin.7    Smokeless tobacco: Never   Substance Use Topics    Alcohol use: Yes     Comment: very little       ROS:  CONSTITUTIONAL:negative for fever  ENT/MOUTH:  positive for sneezing, nasal congestion.   RESP: positive for cough.      OBJECTIVE:  VITALS:  /83 (BP Location: Right arm, Patient Position: Chair, Cuff Size: Adult Large)   Pulse 87   Temp 97.6  F (36.4  C) (Tympanic)   Resp 16   Wt 92.5 kg (204 lb)   LMP 2008   SpO2 96%   BMI 37.01 kg/m        GENERAL APPEARANCE: healthy, alert and no distress.  Patient has no respiratory distress.    HENT: nasal turbinates erythematous, swollen and oral mucous membranes moist, no erythema noted  NECK: supple, nontender, no lymphadenopathy  RESP: lungs clear to auscultation - no rales, rhonchi or wheezes  CV: regular rates and rhythm, normal S1 S2, no murmur noted  SKIN: no cyanosis.      ASSESSMENT:  Acute Cough.  Patient's  recently tested positive for COVID-19.  Patient's at-home test was negative yesterday.      PLAN:  Stay at home until the COVID-19 test result is back.      Patient will do another at-home COVID-19 test today since she will have had five days of symptoms tomorrow. If positive patient will contact [facility] to have Paxlovid treatment e-prescribed.      Get plenty of rest    Drink plenty of water if experiencing fevers.      Go to the emergency room if you develop severe, worsening shortness of breath.     Follow up if not better in 5-7 days.     Pending lab:  COVID-19 PCR Test.      Carlos Bales MD

## 2023-12-16 NOTE — PATIENT INSTRUCTIONS
Stay at home until the COVID-19 test result is back.      Get plenty of rest    Drink plenty of water if experiencing fevers.      Go to the emergency room if you develop severe, worsening shortness of breath.       Follow up if not better in 5-7 days.

## 2023-12-18 LAB — SARS-COV-2 RNA RESP QL NAA+PROBE: POSITIVE

## 2023-12-19 ENCOUNTER — MYC MEDICAL ADVICE (OUTPATIENT)
Dept: PEDIATRICS | Facility: CLINIC | Age: 68
End: 2023-12-19
Payer: MEDICARE

## 2023-12-19 NOTE — TELEPHONE ENCOUNTER
Patient returning call.  Patient's symptoms started 12/13, not eligible for paxlovid.     Home care with COVID previously sent via .       Marie ORR RN on 12/19/2023 at 1:20 PM

## 2023-12-19 NOTE — TELEPHONE ENCOUNTER
Per chart review patient is not eligible as today would be day 6 if her symptoms started on 12/13/2023.     Left a message for her to call back and speak with a triage nurse.     CORIN Noyola on 12/19/2023 at 11:43 AM     Burow's Graft Text: The defect edges were debeveled with a #15 scalpel blade.  Given the location of the defect, shape of the defect, the proximity to free margins and the presence of a standing cone deformity a Burow's skin graft was deemed most appropriate. The standing cone was removed and this tissue was then trimmed to the shape of the primary defect. The adipose tissue was also removed until only dermis and epidermis were left.  The skin margins of the secondary defect were undermined to an appropriate distance in all directions utilizing iris scissors.  The secondary defect was closed with interrupted buried subcutaneous sutures.  The skin edges were then re-apposed with running  sutures.  The skin graft was then placed in the primary defect and oriented appropriately.

## 2024-01-03 ENCOUNTER — ANCILLARY PROCEDURE (OUTPATIENT)
Dept: GENERAL RADIOLOGY | Facility: CLINIC | Age: 69
End: 2024-01-03
Attending: PHYSICIAN ASSISTANT
Payer: MEDICARE

## 2024-01-03 ENCOUNTER — OFFICE VISIT (OUTPATIENT)
Dept: URGENT CARE | Facility: URGENT CARE | Age: 69
End: 2024-01-03
Payer: MEDICARE

## 2024-01-03 ENCOUNTER — HOSPITAL ENCOUNTER (OUTPATIENT)
Dept: ULTRASOUND IMAGING | Facility: CLINIC | Age: 69
Discharge: HOME | End: 2024-01-03
Attending: PHYSICIAN ASSISTANT | Admitting: PHYSICIAN ASSISTANT
Payer: MEDICARE

## 2024-01-03 VITALS
WEIGHT: 200 LBS | TEMPERATURE: 97.9 F | HEART RATE: 80 BPM | RESPIRATION RATE: 16 BRPM | DIASTOLIC BLOOD PRESSURE: 84 MMHG | BODY MASS INDEX: 36.29 KG/M2 | SYSTOLIC BLOOD PRESSURE: 127 MMHG | OXYGEN SATURATION: 96 %

## 2024-01-03 DIAGNOSIS — M25.572 PAIN AND SWELLING OF LEFT ANKLE: ICD-10-CM

## 2024-01-03 DIAGNOSIS — M25.472 PAIN AND SWELLING OF LEFT ANKLE: ICD-10-CM

## 2024-01-03 DIAGNOSIS — S82.832A CLOSED FRACTURE OF DISTAL END OF LEFT FIBULA, UNSPECIFIED FRACTURE MORPHOLOGY, INITIAL ENCOUNTER: Primary | ICD-10-CM

## 2024-01-03 DIAGNOSIS — M79.89 LEG SWELLING: ICD-10-CM

## 2024-01-03 PROCEDURE — 93971 EXTREMITY STUDY: CPT | Mod: LT

## 2024-01-03 PROCEDURE — 73610 X-RAY EXAM OF ANKLE: CPT | Mod: TC | Performed by: RADIOLOGY

## 2024-01-03 PROCEDURE — 73590 X-RAY EXAM OF LOWER LEG: CPT | Mod: TC | Performed by: RADIOLOGY

## 2024-01-03 PROCEDURE — 73630 X-RAY EXAM OF FOOT: CPT | Mod: TC | Performed by: RADIOLOGY

## 2024-01-03 PROCEDURE — 99214 OFFICE O/P EST MOD 30 MIN: CPT | Performed by: PHYSICIAN ASSISTANT

## 2024-01-03 RX ORDER — HYDROCODONE BITARTRATE AND ACETAMINOPHEN 5; 325 MG/1; MG/1
1 TABLET ORAL EVERY 6 HOURS PRN
Qty: 8 TABLET | Refills: 0 | Status: SHIPPED | OUTPATIENT
Start: 2024-01-03 | End: 2024-01-06

## 2024-01-03 NOTE — PROGRESS NOTES
Assessment & Plan     1. Closed fracture of distal end of left fibula, unspecified fracture morphology, initial encounter  Fracture noted on distal fibula. Patient is neurovascularly intact.  Advised RICE therapy, including (rest, ice, compression, elevation)   Over-the-counter analgesics (Tylenol or Ibuprofen) as needed. Norco RX for severe pain, black box warning discussed   Follow-up with Orthopedics / sports medicine in 2-3 days   Seek emergency care if you develop severe pain/swelling, inability to move extremity, numbness / tingling, weakness, skin paleness, or icy cold extremity.      - Ankle/Foot Bracing Supplies Order Walking Boot; Left; Pneumatic; Tall  - Orthopedic  Referral; Future  - HYDROcodone-acetaminophen (NORCO) 5-325 MG tablet; Take 1 tablet by mouth every 6 hours as needed for pain  Dispense: 8 tablet; Refill: 0    2. Leg swelling  No evidence of DVT  - US Lower Extremity Venous Duplex Left; Future        Return in about 3 days (around 2024).    Diagnosis and treatment plan was reviewed with patient and/or family.   We went over any labs or imaging. Discussed worsening symptoms or little to no relief despite treatment plan to follow-up with PCP or return to clinic.  Patient verbalizes understanding. All questions were addressed and answered.     Taryn Molina PA-C  [facility]    CHIEF COMPLAINT:   Chief Complaint   Patient presents with    Urgent Care     Patient states she missed the bottom step when walking down some stairs and twisted her Left ankle. Patient states her ankle, foot, calf, and shin all kenneth and are purple x 6 days     Subjective     Keshia is a 68 year old female who presents to clinic today for evaluation of left foot and ankle injury. Occurred on .  Patient was walking down the steps at a , missed last step and landed on her left leg.  Immediate pain, worsening the following day.  Now in the past 24 hours she has noted  some pain in the anterior lower leg as well as her calf.   Patient denies having fever, chills, numbness, tingling, pale or cold extremity.       Past Medical History:   Diagnosis Date    Anemia     Arthritis     Aleve occasionally    Benign neoplasm of colon     Cervical high risk HPV (human papillomavirus) test positive 10/02/2017 & 10/9/2018 & 10/24/2019    10/2/17 NIL, +HR HPV, not 16/18    Gastro-oesophageal reflux disease     History of blood transfusion     Iron deficiency    Hypertrophy of breast     fibrous left breast- benign    Malignant neoplasm (H)     Mild dysplasia of cervix 12/03/2019    Pure hypercholesterolemia     Skin cancer     Thyroid disease     Levothyroxine     Past Surgical History:   Procedure Laterality Date    BIOPSY OF BREAST, INCISIONAL  2004, 2001    left breast    COLONOSCOPY  10/8/2011    Procedure:COMBINED COLONOSCOPY, SINGLE BIOPSY/POLYPECTOMY BY BIOPSY; COLONOSCOPY; Surgeon:DARIANA RUIZ; Location:[facility]    COLONOSCOPY N/A 10/13/2015    Procedure: COLONOSCOPY;  Surgeon: Toi Sotelo MD;  Location:  GI    COLONOSCOPY Left 7/13/2020    Procedure: COLONOSCOPY;  Surgeon: Toi Sotelo MD;  Location:  GI    DILATION AND CURETTAGE, OPERATIVE HYSTEROSCOPY WITH MORCELLATOR, COMBINED N/A 8/9/2022    Procedure: Hystersocopy, dilation and curettage using morcellator under abdominal ultrasound guidance.;  Surgeon: Linda Schmitt MD;  Location:  OR    ESOPHAGOSCOPY, GASTROSCOPY, DUODENOSCOPY (EGD), COMBINED Left 7/13/2020    Procedure: ESOPHAGOGASTRODUODENOSCOPY, WITH BIOPSies using biopsy forcep;  Surgeon: Toi Sotelo MD;  Location:  GI    PHACOEMULSIFICATION CLEAR CORNEA WITH STANDARD INTRAOCULAR LENS IMPLANT  10/4/2012    Procedure: PHACOEMULSIFICATION CLEAR CORNEA WITH STANDARD INTRAOCULAR LENS IMPLANT;  RIGHT PHACOEMULSIFICATION CLEAR CORNEA WITH STANDARD INTRAOCULAR LENS IMPLANT;  Surgeon: Óscar Torrez MD;  Location: [facility]     PHACOEMULSIFICATION CLEAR CORNEA WITH STANDARD INTRAOCULAR LENS IMPLANT  2012    Procedure: PHACOEMULSIFICATION CLEAR CORNEA WITH STANDARD INTRAOCULAR LENS IMPLANT;  LEFT  PHACOEMULSIFICATION CLEAR CORNEA WITH STANDARD INTRAOCULAR LENS IMPLANT ;  Surgeon: Óscar Torrez MD;  Location: [facility]    SURGICAL HISTORY OF -       removal of skin cancer    [facility] EXPLORATORY OF ABDOMEN  1980    Laparotomy, thought she had ovarian cyst, but nothing was found     Social History     Tobacco Use    Smoking status: Former     Packs/day: 0.50     Years: 10.00     Additional pack years: 0.00     Total pack years: 5.00     Types: Cigarettes     Quit date: 2003     Years since quittin.7    Smokeless tobacco: Never   Substance Use Topics    Alcohol use: Yes     Comment: very little     Current Outpatient Medications   Medication    atorvastatin (LIPITOR) 10 MG tablet    fluticasone (FLONASE) 50 MCG/ACT nasal spray    HYDROcodone-acetaminophen (NORCO) 5-325 MG tablet    levothyroxine (SYNTHROID/LEVOTHROID) 100 MCG tablet    Multiple Vitamins-Minerals (MULTIVITAMIN WOMEN 50+) TABS     No current facility-administered medications for this visit.     Allergies   Allergen Reactions    Influenza Virus Vaccine      Hypertension,flushing  Other reaction(s): Hypertension  Hypertension,flushing       10 point ROS of systems were all negative except for pertinent positives noted in my HPI.      Exam:   /84   Pulse 80   Temp 97.9  F (36.6  C) (Tympanic)   Resp 16   Wt 90.7 kg (200 lb)   LMP 2008   SpO2 96%   BMI 36.29 kg/m    Gen: healthy,alert,no distress  Extremity: Left leg has bruising and swelling at the foot and around the toes.  She has full range of motion in all directions.  Pedal and tibial pulses are intact.  She does have some tenderness over the lateral malleolus on the left side, as well as the anterior distal tibia.  Swelling of her calf is noted.  Negative Rosa sign.    There is not compromise to  the distal circulation.  Pulses are +2 and CRT is brisk  EXTREMITIES: peripheral pulses normal  SKIN: no suspicious lesions or rashes  NEURO: Normal strength and tone, sensory exam grossly normal, mentation intact and speech normal    Results for orders placed or performed during the hospital encounter of 01/03/24   US Lower Extremity Venous Duplex Left     Status: None    Narrative    EXAM: US LOWER EXTREMITY VENOUS DUPLEX LEFT  LOCATION: [facility]  DATE: 1/3/2024    INDICATION: Fell 6 days ago, ankle pain. Now has calf pain and swelling X 1 day with concern for DVT  COMPARISON: None.  TECHNIQUE: Venous Duplex ultrasound of the left lower extremity with and without compression, augmentation and duplex. Color flow and spectral Doppler with waveform analysis performed.    FINDINGS: Exam includes the common femoral, femoral, popliteal, and contralateral common femoral veins as well as segmentally visualized deep calf veins and greater saphenous vein.     LEFT: No deep vein thrombosis. No superficial thrombophlebitis. No popliteal cyst.      Impression    IMPRESSION:  1.  No deep venous thrombosis in the left lower extremity.   Results for orders placed or performed in visit on 01/03/24   XR Tibia and Fibula Left 2 Views     Status: None    Narrative    XR TIBIA AND FIBULA LEFT 2 VIEWS 1/3/2024 11:38 AM     HISTORY: Right ankle / foot pain, along with TTP along tibia X 6 days  after a fall; Pain and swelling of left ankle; Pain and swelling of  left ankle    COMPARISON: None.         Impression    IMPRESSION: The left tibia and fibula are negative.    PRAMOD NGUYỄN MD         SYSTEM ID:  [id number]   Results for orders placed or performed in visit on 01/03/24   XR Ankle Left G/E 3 Views     Status: None    Narrative    XR ANKLE LEFT G/E 3 VIEWS, XR FOOT LEFT G/E 3 VIEWS 1/3/2024 11:39 AM     HISTORY: Right ankle / foot pain, along with TTP along tibia X 6 days  after a fall; Pain and swelling of  left ankle; Pain and swelling of  left ankle    COMPARISON: None.       Impression    IMPRESSION: Nondisplaced fracture of the distal fibula extends into  the lateral corner of the ankle mortise. Degenerative change at the  tibiotalar joint. Soft tissue swelling about the ankle. No foot  fractures are identified.    PRAMOD NGUYỄN MD         SYSTEM ID:  [id number]   Results for orders placed or performed in visit on 01/03/24   XR Foot Left G/E 3 Views     Status: None    Narrative    XR ANKLE LEFT G/E 3 VIEWS, XR FOOT LEFT G/E 3 VIEWS 1/3/2024 11:39 AM     HISTORY: Right ankle / foot pain, along with TTP along tibia X 6 days  after a fall; Pain and swelling of left ankle; Pain and swelling of  left ankle    COMPARISON: None.       Impression    IMPRESSION: Nondisplaced fracture of the distal fibula extends into  the lateral corner of the ankle mortise. Degenerative change at the  tibiotalar joint. Soft tissue swelling about the ankle. No foot  fractures are identified.    PRAMOD NGUYỄN MD         SYSTEM ID:  [id number]       US -- NO evidence of DVT

## 2024-01-03 NOTE — PATIENT INSTRUCTIONS
Fracture noted of the left fibula    Over-the-counter analgesics (Tylenol or Ibuprofen) as needed.   Follow-up with Orthopedics in 2-3 days.    Seek emergency care if you develop severe pain/swelling, inability to move extremity, numbness / tingling, weakness, skin paleness, or icy cold extremity.

## 2024-01-04 ENCOUNTER — TELEPHONE (OUTPATIENT)
Dept: ORTHOPEDICS | Facility: CLINIC | Age: 69
End: 2024-01-04
Payer: MEDICARE

## 2024-01-04 NOTE — TELEPHONE ENCOUNTER
After clinical review with Óscar Amin and Dr. Michelle it was determined that this patient may be a surgical candidate and should see a podiatrist and no sports medicine. Was able to call and talk with the patient to reschedule her for 1/5 with Dr. Michelle patient was double booked and made aware of it. She will arrive at 3 o'clock and be seen as soon as possible. Patient was appreciative of this and no further questions or concerns.    Carla Chase, ATC, LAT

## 2024-01-05 ENCOUNTER — OFFICE VISIT (OUTPATIENT)
Dept: PODIATRY | Facility: CLINIC | Age: 69
End: 2024-01-05
Payer: MEDICARE

## 2024-01-05 ENCOUNTER — ANCILLARY PROCEDURE (OUTPATIENT)
Dept: GENERAL RADIOLOGY | Facility: CLINIC | Age: 69
End: 2024-01-05
Attending: PODIATRIST
Payer: MEDICARE

## 2024-01-05 ENCOUNTER — PREP FOR PROCEDURE (OUTPATIENT)
Dept: PODIATRY | Facility: CLINIC | Age: 69
End: 2024-01-05

## 2024-01-05 VITALS — DIASTOLIC BLOOD PRESSURE: 80 MMHG | WEIGHT: 200 LBS | SYSTOLIC BLOOD PRESSURE: 130 MMHG | BODY MASS INDEX: 36.29 KG/M2

## 2024-01-05 DIAGNOSIS — S82.832A CLOSED FRACTURE OF DISTAL END OF LEFT FIBULA, UNSPECIFIED FRACTURE MORPHOLOGY, INITIAL ENCOUNTER: Primary | ICD-10-CM

## 2024-01-05 DIAGNOSIS — S82.62XA CLOSED DISPLACED FRACTURE OF LATERAL MALLEOLUS OF LEFT FIBULA, INITIAL ENCOUNTER: Primary | ICD-10-CM

## 2024-01-05 DIAGNOSIS — M79.673 FOOT PAIN: ICD-10-CM

## 2024-01-05 DIAGNOSIS — M89.9 DISORDER OF BONE, UNSPECIFIED: ICD-10-CM

## 2024-01-05 PROCEDURE — 82306 VITAMIN D 25 HYDROXY: CPT | Mod: GZ | Performed by: PODIATRIST

## 2024-01-05 PROCEDURE — 73610 X-RAY EXAM OF ANKLE: CPT | Mod: TC | Performed by: RADIOLOGY

## 2024-01-05 PROCEDURE — 99204 OFFICE O/P NEW MOD 45 MIN: CPT | Performed by: PODIATRIST

## 2024-01-05 PROCEDURE — 36415 COLL VENOUS BLD VENIPUNCTURE: CPT | Performed by: PODIATRIST

## 2024-01-05 NOTE — PATIENT INSTRUCTIONS
"Thank you for choosing [facility] Podiatry / Foot & Ankle Surgery!    DR. DUNCAN'S CLINIC LOCATIONS:     [facility] (Friday) TRIAGE LINE: [phone] [address] [facility]  APPOINTMENTS: [phone]   MICHELLE Sim [address] RADIOLOGY: [phone]    PHYSICAL THERAPY: [phone]    SET UP SURGERY: [phone]   [place] (Mon-Tues AM-Thurs) BILLING QUESTIONS: [phone] [address] FAX: [phone]   MICHELLE Block [address] [facility]: [phone]     FOOT & ANKLE SURGERY PLANNING CHECKLIST  If you have decided to have surgery, follow these steps to get the procedure scheduled and to have the proper paperwork filled out. If you are unsure about surgery or would like to sit down and further discuss your issue and treatment options, please make an appointment.    1.  Pick the date that you would like to have surgery. Surgery is done on a Wednesday at [facility]. Keep in mind that you will likely need at least 2 weeks off after the procedure for proper rest and healing.    2.  Call the surgery scheduling line at [phone] to get the procedure scheduled. Our  will also help you make your pre-operative physical with a primary doctor, your surgery consult appointment with me and your one week follow up after surgery for your first dressing change.    3.  If our surgery scheduler does not make your surgery consultation appointment with me then call to schedule that. When making the appointment, say \"I need to make a 30 minute surgical consult appointment\".     4. Contact your employer to request time off from work if needed. If there is going to be FMLA used, please have them fax the forms to [phone] at least one week prior to surgery.    * If you have any post-operative questions regarding your procedure, call our triage team at the [facility] at [phone].    "

## 2024-01-05 NOTE — LETTER
"    2024         RE: Vanessa Mckeon  [address]  [place] [address]        Dear Colleague,    Thank you for referring your patient, Vanessa Mckeon, to the [facility]. Please see a copy of my visit note below.    Foot & Ankle Surgery  2024    CC: \"broken ankle\"    I was asked to see Vanessa Mckeon regarding the chief complaint by:  ZAIRA Amin PA-C    HPI:  Pt is a 68 year old female who presents with above complaint.  The patient was seen in urgent care on 1/3/2024 after sustaining an ankle injury where she tripped on stairs at a .  X-rays in urgent care showed a minimally displaced fracture of the distal fibula.  I was asked to review the x-rays out of concerns for possible widening of the medial gutter.  I advised weightbearing x-rays of the noninvolved ankle.  In comparison, there did appear to be mild widening of the medial gutter on the injured ankle.  As such, I advised follow-up in clinic for discussion of surgical options    ROS:   Pos for CC.  The patient denies current nausea, vomiting, chills, fevers, belly pain, calf pain, chest pain or SOB.  Complete remainder of ROS is otherwise neg.    VITALS:    Vitals:    24 1437   BP: 130/80   Weight: 90.7 kg (200 lb)       PMH:    Past Medical History:   Diagnosis Date     Anemia      Arthritis     Aleve occasionally     Benign neoplasm of colon      Cervical high risk HPV (human papillomavirus) test positive 10/02/2017 & 10/9/2018 & 10/24/2019    10/2/17 NIL, +HR HPV, not 16/18     Gastro-oesophageal reflux disease      History of blood transfusion     Iron deficiency     Hypertrophy of breast     fibrous left breast- benign     Malignant neoplasm (H)      Mild dysplasia of cervix 2019     Pure hypercholesterolemia      Skin cancer      Thyroid disease     Levothyroxine       SXHX:    Past Surgical History:   Procedure Laterality Date     BIOPSY OF BREAST, INCISIONAL  ,     " left breast     COLONOSCOPY  10/8/2011    Procedure:COMBINED COLONOSCOPY, SINGLE BIOPSY/POLYPECTOMY BY BIOPSY; COLONOSCOPY; Surgeon:DARIANA RUIZ; Location: GI     COLONOSCOPY N/A 10/13/2015    Procedure: COLONOSCOPY;  Surgeon: Toi Sotelo MD;  Location: [facility]     COLONOSCOPY Left 7/13/2020    Procedure: COLONOSCOPY;  Surgeon: Toi Sotelo MD;  Location:  GI     DILATION AND CURETTAGE, OPERATIVE HYSTEROSCOPY WITH MORCELLATOR, COMBINED N/A 8/9/2022    Procedure: Hystersocopy, dilation and curettage using morcellator under abdominal ultrasound guidance.;  Surgeon: Linda Schmitt MD;  Location:  OR     ESOPHAGOSCOPY, GASTROSCOPY, DUODENOSCOPY (EGD), COMBINED Left 7/13/2020    Procedure: ESOPHAGOGASTRODUODENOSCOPY, WITH BIOPSies using biopsy forcep;  Surgeon: Toi Sotelo MD;  Location:  GI     PHACOEMULSIFICATION CLEAR CORNEA WITH STANDARD INTRAOCULAR LENS IMPLANT  10/4/2012    Procedure: PHACOEMULSIFICATION CLEAR CORNEA WITH STANDARD INTRAOCULAR LENS IMPLANT;  RIGHT PHACOEMULSIFICATION CLEAR CORNEA WITH STANDARD INTRAOCULAR LENS IMPLANT;  Surgeon: Óscar Torrez MD;  Location: [facility]     PHACOEMULSIFICATION CLEAR CORNEA WITH STANDARD INTRAOCULAR LENS IMPLANT  11/1/2012    Procedure: PHACOEMULSIFICATION CLEAR CORNEA WITH STANDARD INTRAOCULAR LENS IMPLANT;  LEFT  PHACOEMULSIFICATION CLEAR CORNEA WITH STANDARD INTRAOCULAR LENS IMPLANT ;  Surgeon: Óscar Torrez MD;  Location: [facility]     SURGICAL HISTORY OF -       removal of skin cancer     [facility] EXPLORATORY OF ABDOMEN  1980    Laparotomy, thought she had ovarian cyst, but nothing was found        MEDS:    Current Outpatient Medications   Medication     atorvastatin (LIPITOR) 10 MG tablet     fluticasone (FLONASE) 50 MCG/ACT nasal spray     HYDROcodone-acetaminophen (NORCO) 5-325 MG tablet     levothyroxine (SYNTHROID/LEVOTHROID) 100 MCG tablet     Multiple Vitamins-Minerals (MULTIVITAMIN WOMEN 50+) TABS     No current  facility-administered medications for this visit.       ALL:     Allergies   Allergen Reactions     Influenza Virus Vaccine      Hypertension,flushing  Other reaction(s): Hypertension  Hypertension,flushing       FMH:    Family History   Problem Relation Age of Onset     Diabetes Father         adult     Neurologic Disorder Father         parkinsons     Cancer Father 95        lung     Heart Disease Father      C.A.D. 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 No   Social History Narrative    Dairy/d 3-4 servings/d    Caffeine 5 servings/d    Exercise 4-5 x week    Sunscreen used - Yes    Seatbelts used - Yes    Working smoke/CO detectors in the home - Yes    Guns stored in the home - No    Self Breast Exams - Yes    Self Testicular Exam - NOT APPLICABLE    Eye Exam up to date - no,will make appt    Dental Exam up to date  - Yes-this past Sat    Pap Smear up to date - no    Mammogram up to date - June 8 2009,new ins,will be getting another mammo soon    PSA up to date - NOT APPLICABLE    Dexa Scan up to date - NOT APPLICABLE    Flex Sig / Colonoscopy up to date - Yes 2006, repeat in 5 yrs    Immunizations up to date - Yes-Td 2006    Abuse: Current or Past(Physical, Sexual or Emotional)- No    Do you feel safe in your environment - Yes                         Social Determinants of Health     Financial Resource Strain: Low Risk  (11/28/2022)    Overall Financial Resource Strain (CARDIA)      Difficulty of Paying Living Expenses: Not very hard   Food Insecurity: No Food Insecurity (11/28/2022)    Hunger Vital Sign      Worried About Running Out of Food in the Last Year: Never true      Ran Out of Food in the Last Year: Never true   Transportation Needs: No Transportation Needs (11/28/2022)    PRAPARE - Transportation      Lack of Transportation (Medical): No      Lack of Transportation (Non-Medical): No   Physical Activity: Insufficiently Active (11/28/2022)    Exercise Vital Sign      Days of Exercise per Week: 3 days      Minutes of Exercise per Session: 30 min   Stress: No Stress Concern Present (11/28/2022)    [place] [employer] - Occupational Stress Questionnaire      Feeling of Stress : Not at all   Social Connections: Moderately Integrated (11/28/2022)    Social Connection and Isolation Panel [NHANES]      Frequency of Communication with Friends and Family: More than three times a week      Frequency of Social Gatherings with Friends and Family: Once a week      Attends Oriental orthodox Services: More than 4 times per year      Active Member of Clubs or Organizations: No      Attends Club or Organization Meetings: Not on file      Marital Status:    Interpersonal Safety: Not on file   Housing Stability: Low Risk  (11/28/2022)    Housing Stability Vital Sign      Unable to Pay for Housing in the Last Year: No       Number of Places Lived in the Last Year: 1      Unstable Housing in the Last Year: No           EXAMINATION:  Gen:   No apparent distress  Neuro:   A&Ox3, no deficits  Psych:    Answering questions appropriately for age and situation with normal affect  Head:    NCAT  Eye:    Visual scanning without deficit  Ear:    Response to auditory stimuli wnl  Lung:    Non-labored breathing on RA noted  Abd:    NTND per patient report  Lymph:   Moderate swelling and ecchymosis throughout the left ankle  Vasc:    Pulses palpable, CFT minimally delayed  Neuro:    Light touch sensation intact to all sensory nerve distributions without paresthesias  Derm:    Neg for nodules, lesions or ulcerations  MSK:    Left lower extremity -pain on palpation of the distal fibula.  She also has pain along the level of the deltoid ligament.  No syndesmotic pain is seen.  Calf:    Neg for redness, swelling or tenderness      Imaging: X-rays left ankle 1/3/2024 - IMPRESSION: Nondisplaced fracture of the distal fibula extends into  the lateral corner of the ankle mortise. Degenerative change at the  tibiotalar joint. Soft tissue swelling about the ankle. No foot  fractures are identified.    Labs: Vitamin D draw ordered    Assessment:  68 year old female with mildly displaced left distal fibular fracture with widening of the medial gutter      Medical Decision Making/Plan:  Discussed etiologies, anatomy and options  1.  Mildly displaced left distal fibular fracture with widening of the medial gutter  -I personally reviewed and interpreted the patient's lower extremity history pertinent to today's visit, including imaging/labs, in preparation for initiating a treatment program.  -I personally reviewed and interpreted the 1/3/2024 x-rays.  X-ray report shows no displacement.  By my read, there is 3+ millimeters of lateral displacement of the fibular fragment.  I ordered weightbearing x-rays of the right ankle today and in comparison, there is  subtle widening of the medial gutter on the left ankle.  As such, I recommend ORIF for stable fixation and realignment of the joint.  We reviewed the procedure and a generic postop recovery  -Vitamin D deficiency order signed  -Our surgery scheduling handout was dispensed and the surgery order was signed  -If the patient wants to proceed with conservative management, we discussed nonweightbearing and following up every 4 weeks for repeat x-rays and assessment  -Tensogrip was dispensed.  Encouraged aggressive compression, icing and elevation for swelling control to help prepare the soft tissue envelope should she elect to proceed with surgery    Job duties; time off work - retired;   Smoking history - neg  Vit D - draw ordered  Diabetic/A1c - neg  Hemoglobin - draw prior  Clot history - neg/neg  Allergies to surgical implants/suture - neg  Allergies/issues with narcotics - neg    Procedure(s):  1.  Open reduction internal fixation left ankle fracture  Consent: above  Diagnosis:  fibular fracture  Equipment/Vendor: [employer]     Pain Med Plan:  norco, atarax, ibuprofen  DVT prophylaxis:  Mechanical, xarelto x 2 weeks  WB Status Post-op:  NWB x 2 weeks followed by TDWB  WB Device:  boot, crutches  Vit D Supplementation:  based on draw      Follow up:  prn or sooner with acute issues      Patient's medical history was reviewed today      Doron Michelle DPM FACFAS FACFAOM  Podiatric Foot & Ankle Surgeon  [facility]  [phone]    Disclaimer: This note consists of symbols derived from keyboarding, dictation and/or voice recognition software. As a result, there may be errors in the script that have gone undetected. Please consider this when interpreting information found in this chart.          Again, thank you for allowing me to participate in the care of your patient.        Sincerely,        Doron Michelle DPM, MANI

## 2024-01-05 NOTE — PROGRESS NOTES
"Foot & Ankle Surgery  January 5, 2024    CC: \"***\"    I was asked to see Vanessa Mckeon regarding the chief complaint by:  ***    HPI:  Pt is a 68 year old female who presents with above complaint.  ***    ROS:   Pos for CC.  The patient denies current nausea, vomiting, chills, fevers, belly pain, calf pain, chest pain or SOB.  Complete remainder of ROS is otherwise neg.    VITALS:    Vitals:    01/05/24 1437   BP: 130/80   Weight: 90.7 kg (200 lb)       PMH:    Past Medical History:   Diagnosis Date    Anemia     Arthritis     Aleve occasionally    Benign neoplasm of colon     Cervical high risk HPV (human papillomavirus) test positive 10/02/2017 & 10/9/2018 & 10/24/2019    10/2/17 NIL, +HR HPV, not 16/18    Gastro-oesophageal reflux disease     History of blood transfusion     Iron deficiency    Hypertrophy of breast     fibrous left breast- benign    Malignant neoplasm (H)     Mild dysplasia of cervix 12/03/2019    Pure hypercholesterolemia     Skin cancer     Thyroid disease     Levothyroxine       SXHX:    Past Surgical History:   Procedure Laterality Date    BIOPSY OF BREAST, INCISIONAL  2004, 2001    left breast    COLONOSCOPY  10/8/2011    Procedure:COMBINED COLONOSCOPY, SINGLE BIOPSY/POLYPECTOMY BY BIOPSY; COLONOSCOPY; Surgeon:DARIANA RUIZ; Location: GI    COLONOSCOPY N/A 10/13/2015    Procedure: COLONOSCOPY;  Surgeon: Toi Sotelo MD;  Location:  GI    COLONOSCOPY Left 7/13/2020    Procedure: COLONOSCOPY;  Surgeon: Toi Sotelo MD;  Location:  GI    DILATION AND CURETTAGE, OPERATIVE HYSTEROSCOPY WITH MORCELLATOR, COMBINED N/A 8/9/2022    Procedure: Hystersocopy, dilation and curettage using morcellator under abdominal ultrasound guidance.;  Surgeon: Linda Schmitt MD;  Location:  OR    ESOPHAGOSCOPY, GASTROSCOPY, DUODENOSCOPY (EGD), COMBINED Left 7/13/2020    Procedure: ESOPHAGOGASTRODUODENOSCOPY, WITH BIOPSies using biopsy forcep;  Surgeon: Toi Sotelo MD;  " Location:  GI    PHACOEMULSIFICATION CLEAR CORNEA WITH STANDARD INTRAOCULAR LENS IMPLANT  10/4/2012    Procedure: PHACOEMULSIFICATION CLEAR CORNEA WITH STANDARD INTRAOCULAR LENS IMPLANT;  RIGHT PHACOEMULSIFICATION CLEAR CORNEA WITH STANDARD INTRAOCULAR LENS IMPLANT;  Surgeon: Óscar Torrez MD;  Location: [facility]    PHACOEMULSIFICATION CLEAR CORNEA WITH STANDARD INTRAOCULAR LENS IMPLANT  11/1/2012    Procedure: PHACOEMULSIFICATION CLEAR CORNEA WITH STANDARD INTRAOCULAR LENS IMPLANT;  LEFT  PHACOEMULSIFICATION CLEAR CORNEA WITH STANDARD INTRAOCULAR LENS IMPLANT ;  Surgeon: Óscar Torrez MD;  Location: [facility]    SURGICAL HISTORY OF -       removal of skin cancer    [facility] EXPLORATORY OF ABDOMEN  1980    Laparotomy, thought she had ovarian cyst, but nothing was found        MEDS:    Current Outpatient Medications   Medication    atorvastatin (LIPITOR) 10 MG tablet    fluticasone (FLONASE) 50 MCG/ACT nasal spray    HYDROcodone-acetaminophen (NORCO) 5-325 MG tablet    levothyroxine (SYNTHROID/LEVOTHROID) 100 MCG tablet    Multiple Vitamins-Minerals (MULTIVITAMIN WOMEN 50+) TABS     No current facility-administered medications for this visit.       ALL:     Allergies   Allergen Reactions    Influenza Virus Vaccine      Hypertension,flushing  Other reaction(s): Hypertension  Hypertension,flushing       FMH:    Family History   Problem Relation Age of Onset    Diabetes Father         adult    Neurologic Disorder Father         parkinsons    Cancer Father 95        lung    Heart Disease Father     C.A.D. 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 No   Social History Narrative    Dairy/d 3-4 servings/d    Caffeine 5 servings/d    Exercise 4-5 x week    Sunscreen used - Yes    Seatbelts used - Yes    Working smoke/CO detectors in the home - Yes    Guns stored in the home - No    Self Breast Exams - Yes    Self Testicular Exam - NOT APPLICABLE    Eye Exam up to date - no,will make appt    Dental Exam up to date - Yes-this past Sat    Pap Smear up to date - no    Mammogram up to date - 2009,new ins,will be getting another mammo soon    PSA up to date - NOT APPLICABLE    Dexa Scan up to date - NOT APPLICABLE    Flex Sig / Colonoscopy up to date - Yes , repeat in 5 yrs    Immunizations up to date - Yes-Td     Abuse: Current or Past(Physical, Sexual or Emotional)- No    Do you feel safe in your environment - Yes                         Social Determinants of Health     Financial Resource Strain: Low Risk  (2022)    Overall Financial Resource Strain (CARDIA)     Difficulty of Paying Living Expenses: Not very hard   Food Insecurity: No Food Insecurity (2022)    Hunger Vital Sign     Worried About Running Out of Food in the Last Year: Never true     Ran Out of Food in the Last Year: Never true   Transportation Needs: No Transportation Needs  (11/28/2022)    PRAPARE - Transportation     Lack of Transportation (Medical): No     Lack of Transportation (Non-Medical): No   Physical Activity: Insufficiently Active (11/28/2022)    Exercise Vital Sign     Days of Exercise per Week: 3 days     Minutes of Exercise per Session: 30 min   Stress: No Stress Concern Present (11/28/2022)    [place] of [employer]ational Health - Occupational Stress Questionnaire     Feeling of Stress : Not at all   Social Connections: Moderately Integrated (11/28/2022)    Social Connection and Isolation Panel [NHANES]     Frequency of Communication with Friends and Family: More than three times a week     Frequency of Social Gatherings with Friends and Family: Once a week     Attends Zoroastrian Services: More than 4 times per year     Active Member of Clubs or Organizations: No     Attends Club or Organization Meetings: Not on file     Marital Status:    Interpersonal Safety: Not on file   Housing Stability: Low Risk  (11/28/2022)    Housing Stability Vital Sign     Unable to Pay for Housing in the Last Year: No     Number of Places Lived in the Last Year: 1     Unstable Housing in the Last Year: No           EXAMINATION:  Gen:   No apparent distress  Neuro:   A&Ox3, no deficits  Psych:    Answering questions appropriately for age and situation with normal affect  Head:    NCAT  Eye:    Visual scanning without deficit  Ear:    Response to auditory stimuli wnl  Lung:    Non-labored breathing on RA noted  Abd:    NTND per patient report  Lymph:    Neg for pitting/non-pitting edema BLE  Vasc:    Pulses palpable, CFT minimally delayed  Neuro:    Light touch sensation intact to all sensory nerve distributions without paresthesias  Derm:    Neg for nodules, lesions or ulcerations  MSK:    ROM, strength wnl without limitation, no pain on palpation noted.  Calf:    Neg for redness, swelling or tenderness  ***    Imaging:  ***  Labs:  ***  Cultures:  ***    Assessment:  68 year old  female with ***      Medical Decision Making/Plan:  Discussed etiologies, anatomy and options  1.  ***  -I personally reviewed and interpreted the patient's lower extremity history pertinent to today's visit, including imaging/labs, in preparation for initiating a treatment program.  -***    Job duties; time off work - retired;   Smoking history - neg  Vit D - draw ordered  Diabetic/A1c - neg  Hemoglobin - draw prior  Clot history - neg/neg  Allergies to surgical implants/suture - neg  Allergies/issues with narcotics - neg    Procedure(s):  1.  Open reduction internal fixation left ankle fracture  Consent: above  Diagnosis:  fibular fracture  Equipment/Vendor: [employer]    Pain Med Plan:  norco, atarax, ibuprofen  DVT prophylaxis:  Mechanical, xarelto x 2 weeks  WB Status Post-op:  NWB x 2 weeks followed by TDWB  WB Device:  boot, crutches  Vit D Supplementation:  based on draw        Follow up:  *** or sooner with acute issues      Patient's medical history was reviewed today      Doron Michelle DPM FACFAS FACFAOM  Podiatric Foot & Ankle Surgeon  [facility]  [phone]    Disclaimer: This note consists of symbols derived from keyboarding, dictation and/or voice recognition software. As a result, there may be errors in the script that have gone undetected. Please consider this when interpreting information found in this chart.

## 2024-01-05 NOTE — PROGRESS NOTES
"Order signed for \"open reduction internal fixation left ankle fracture\"    Lateral left set up    General with blocks    Arthrex    Doron Michelle DPM FACFAS FACFAOM  Podiatric Foot & Ankle Surgeon  [facility]  [phone]    "

## 2024-01-05 NOTE — PROGRESS NOTES
"Foot & Ankle Surgery  2024    CC: \"broken ankle\"    I was asked to see Vanessa Mckeon regarding the chief complaint by:  ZAIRA Amin PA-C    HPI:  Pt is a 68 year old female who presents with above complaint.  The patient was seen in urgent care on 1/3/2024 after sustaining an ankle injury where she tripped on stairs at a .  X-rays in urgent care showed a minimally displaced fracture of the distal fibula.  I was asked to review the x-rays out of concerns for possible widening of the medial gutter.  I advised weightbearing x-rays of the noninvolved ankle.  In comparison, there did appear to be mild widening of the medial gutter on the injured ankle.  As such, I advised follow-up in clinic for discussion of surgical options    ROS:   Pos for CC.  The patient denies current nausea, vomiting, chills, fevers, belly pain, calf pain, chest pain or SOB.  Complete remainder of ROS is otherwise neg.    VITALS:    Vitals:    24 1437   BP: 130/80   Weight: 90.7 kg (200 lb)       PMH:    Past Medical History:   Diagnosis Date    Anemia     Arthritis     Aleve occasionally    Benign neoplasm of colon     Cervical high risk HPV (human papillomavirus) test positive 10/02/2017 & 10/9/2018 & 10/24/2019    10/2/17 NIL, +HR HPV, not 16/18    Gastro-oesophageal reflux disease     History of blood transfusion     Iron deficiency    Hypertrophy of breast     fibrous left breast- benign    Malignant neoplasm (H)     Mild dysplasia of cervix 2019    Pure hypercholesterolemia     Skin cancer     Thyroid disease     Levothyroxine       SXHX:    Past Surgical History:   Procedure Laterality Date    BIOPSY OF BREAST, INCISIONAL  ,     left breast    COLONOSCOPY  10/8/2011    Procedure:COMBINED COLONOSCOPY, SINGLE BIOPSY/POLYPECTOMY BY BIOPSY; COLONOSCOPY; Surgeon:DARIANA RUIZ; Location: GI    COLONOSCOPY N/A 10/13/2015    Procedure: COLONOSCOPY;  Surgeon: Toi Sotelo MD;  Location: [facility]    " COLONOSCOPY Left 7/13/2020    Procedure: COLONOSCOPY;  Surgeon: Toi Sotelo MD;  Location:  GI    DILATION AND CURETTAGE, OPERATIVE HYSTEROSCOPY WITH MORCELLATOR, COMBINED N/A 8/9/2022    Procedure: Hystersocopy, dilation and curettage using morcellator under abdominal ultrasound guidance.;  Surgeon: Linda Schmitt MD;  Location:  OR    ESOPHAGOSCOPY, GASTROSCOPY, DUODENOSCOPY (EGD), COMBINED Left 7/13/2020    Procedure: ESOPHAGOGASTRODUODENOSCOPY, WITH BIOPSies using biopsy forcep;  Surgeon: Toi Sotelo MD;  Location:  GI    PHACOEMULSIFICATION CLEAR CORNEA WITH STANDARD INTRAOCULAR LENS IMPLANT  10/4/2012    Procedure: PHACOEMULSIFICATION CLEAR CORNEA WITH STANDARD INTRAOCULAR LENS IMPLANT;  RIGHT PHACOEMULSIFICATION CLEAR CORNEA WITH STANDARD INTRAOCULAR LENS IMPLANT;  Surgeon: Óscar Torrez MD;  Location: [facility]    PHACOEMULSIFICATION CLEAR CORNEA WITH STANDARD INTRAOCULAR LENS IMPLANT  11/1/2012    Procedure: PHACOEMULSIFICATION CLEAR CORNEA WITH STANDARD INTRAOCULAR LENS IMPLANT;  LEFT  PHACOEMULSIFICATION CLEAR CORNEA WITH STANDARD INTRAOCULAR LENS IMPLANT ;  Surgeon: Óscar Torrez MD;  Location: [facility]    SURGICAL HISTORY OF -       removal of skin cancer    [facility] EXPLORATORY OF ABDOMEN  1980    Laparotomy, thought she had ovarian cyst, but nothing was found        MEDS:    Current Outpatient Medications   Medication    atorvastatin (LIPITOR) 10 MG tablet    fluticasone (FLONASE) 50 MCG/ACT nasal spray    HYDROcodone-acetaminophen (NORCO) 5-325 MG tablet    levothyroxine (SYNTHROID/LEVOTHROID) 100 MCG tablet    Multiple Vitamins-Minerals (MULTIVITAMIN WOMEN 50+) TABS     No current facility-administered medications for this visit.       ALL:     Allergies   Allergen Reactions    Influenza Virus Vaccine      Hypertension,flushing  Other reaction(s): Hypertension  Hypertension,flushing       FMH:    Family History   Problem Relation Age of Onset    Diabetes Father          adult    Neurologic Disorder Father         parkinsons    Cancer Father 95        lung    Heart Disease Father     C.A.D. 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 No   Social History Narrative    Dairy/d 3-4 servings/d    Caffeine 5 servings/d    Exercise 4-5 x week    Sunscreen used - Yes    Seatbelts used - Yes    Working smoke/CO detectors in the home - Yes    Guns stored in the home - No    Self Breast Exams - Yes    Self Testicular Exam - NOT APPLICABLE    Eye Exam up to date - no,will make appt    Dental Exam up to date - Yes-this past Sat    Pap Smear up to date - no    Mammogram up to date - 2009,new ins,will be getting another mammo soon    PSA up to date - NOT APPLICABLE    Dexa Scan up to date - NOT APPLICABLE    Flex Sig / Colonoscopy up to date - Yes , repeat in 5 yrs    Immunizations up to date - Yes-Td      Abuse: Current or Past(Physical, Sexual or Emotional)- No    Do you feel safe in your environment - Yes                         Social Determinants of Health     Financial Resource Strain: Low Risk  (11/28/2022)    Overall Financial Resource Strain (CARDIA)     Difficulty of Paying Living Expenses: Not very hard   Food Insecurity: No Food Insecurity (11/28/2022)    Hunger Vital Sign     Worried About Running Out of Food in the Last Year: Never true     Ran Out of Food in the Last Year: Never true   Transportation Needs: No Transportation Needs (11/28/2022)    PRAPARE - Transportation     Lack of Transportation (Medical): No     Lack of Transportation (Non-Medical): No   Physical Activity: Insufficiently Active (11/28/2022)    Exercise Vital Sign     Days of Exercise per Week: 3 days     Minutes of Exercise per Session: 30 min   Stress: No Stress Concern Present (11/28/2022)    [place] [employer] - Occupational Stress Questionnaire     Feeling of Stress : Not at all   Social Connections: Moderately Integrated (11/28/2022)    Social Connection and Isolation Panel [NHANES]     Frequency of Communication with Friends and Family: More than three times a week     Frequency of Social Gatherings with Friends and Family: Once a week     Attends [employer] Services: More than 4 times per year     Active Member of Clubs or Organizations: No     Attends Club or Organization Meetings: Not on file     Marital Status:    Interpersonal Safety: Not on file   Housing Stability: Low Risk  (11/28/2022)    Housing Stability Vital Sign     Unable to Pay for Housing in the Last Year: No     Number of Places Lived in the Last Year: 1     Unstable Housing in the Last Year: No           EXAMINATION:  Gen:   No apparent distress  Neuro:   A&Ox3, no deficits  Psych:    Answering questions appropriately for age and situation with normal affect  Head:    NCAT  Eye:    Visual scanning without deficit  Ear:    Response  to auditory stimuli wnl  Lung:    Non-labored breathing on RA noted  Abd:    NTND per patient report  Lymph:   Moderate swelling and ecchymosis throughout the left ankle  Vasc:    Pulses palpable, CFT minimally delayed  Neuro:    Light touch sensation intact to all sensory nerve distributions without paresthesias  Derm:    Neg for nodules, lesions or ulcerations  MSK:    Left lower extremity -pain on palpation of the distal fibula.  She also has pain along the level of the deltoid ligament.  No syndesmotic pain is seen.  Calf:    Neg for redness, swelling or tenderness      Imaging: X-rays left ankle 1/3/2024 - IMPRESSION: Nondisplaced fracture of the distal fibula extends into  the lateral corner of the ankle mortise. Degenerative change at the  tibiotalar joint. Soft tissue swelling about the ankle. No foot  fractures are identified.    Labs: Vitamin D draw ordered    Assessment:  68 year old female with mildly displaced left distal fibular fracture with widening of the medial gutter      Medical Decision Making/Plan:  Discussed etiologies, anatomy and options  1.  Mildly displaced left distal fibular fracture with widening of the medial gutter  -I personally reviewed and interpreted the patient's lower extremity history pertinent to today's visit, including imaging/labs, in preparation for initiating a treatment program.  -I personally reviewed and interpreted the 1/3/2024 x-rays.  X-ray report shows no displacement.  By my read, there is 3+ millimeters of lateral displacement of the fibular fragment.  I ordered weightbearing x-rays of the right ankle today and in comparison, there is subtle widening of the medial gutter on the left ankle.  As such, I recommend ORIF for stable fixation and realignment of the joint.  We reviewed the procedure and a generic postop recovery  -Vitamin D deficiency order signed  -Our surgery scheduling handout was dispensed and the surgery order was signed  -If the patient wants to  proceed with conservative management, we discussed nonweightbearing and following up every 4 weeks for repeat x-rays and assessment  -Tensogrip was dispensed.  Encouraged aggressive compression, icing and elevation for swelling control to help prepare the soft tissue envelope should she elect to proceed with surgery    Job duties; time off work - retired;   Smoking history - neg  Vit D - draw ordered  Diabetic/A1c - neg  Hemoglobin - draw prior  Clot history - neg/neg  Allergies to surgical implants/suture - neg  Allergies/issues with narcotics - neg    Procedure(s):  1.  Open reduction internal fixation left ankle fracture  Consent: above  Diagnosis:  fibular fracture  Equipment/Vendor: [employer]     Pain Med Plan:  norco, atarax, ibuprofen  DVT prophylaxis:  Mechanical, xarelto x 2 weeks  WB Status Post-op:  NWB x 2 weeks followed by TDWB  WB Device:  boot, crutches  Vit D Supplementation:  based on draw      Follow up:  prn or sooner with acute issues      Patient's medical history was reviewed today      Doron Michelle DPM FACFAS FACFAOM  Podiatric Foot & Ankle Surgeon  [facility]  [phone]    Disclaimer: This note consists of symbols derived from keyboarding, dictation and/or voice recognition software. As a result, there may be errors in the script that have gone undetected. Please consider this when interpreting information found in this chart.

## 2024-01-06 LAB — VIT D+METAB SERPL-MCNC: 24 NG/ML (ref 20–50)

## 2024-01-08 ENCOUNTER — TELEPHONE (OUTPATIENT)
Dept: PODIATRY | Facility: CLINIC | Age: 69
End: 2024-01-08

## 2024-01-08 ENCOUNTER — MYC MEDICAL ADVICE (OUTPATIENT)
Dept: PEDIATRICS | Facility: CLINIC | Age: 69
End: 2024-01-08

## 2024-01-08 ENCOUNTER — OFFICE VISIT (OUTPATIENT)
Dept: FAMILY MEDICINE | Facility: CLINIC | Age: 69
End: 2024-01-08
Payer: MEDICARE

## 2024-01-08 VITALS
RESPIRATION RATE: 16 BRPM | SYSTOLIC BLOOD PRESSURE: 130 MMHG | HEART RATE: 79 BPM | TEMPERATURE: 97.7 F | HEIGHT: 62 IN | OXYGEN SATURATION: 92 % | DIASTOLIC BLOOD PRESSURE: 84 MMHG | BODY MASS INDEX: 36.29 KG/M2

## 2024-01-08 DIAGNOSIS — Z01.818 PREOP GENERAL PHYSICAL EXAM: Primary | ICD-10-CM

## 2024-01-08 DIAGNOSIS — S82.832A CLOSED FRACTURE OF DISTAL END OF LEFT FIBULA, UNSPECIFIED FRACTURE MORPHOLOGY, INITIAL ENCOUNTER: ICD-10-CM

## 2024-01-08 PROCEDURE — 99213 OFFICE O/P EST LOW 20 MIN: CPT | Mod: 25 | Performed by: NURSE PRACTITIONER

## 2024-01-08 PROCEDURE — 93000 ELECTROCARDIOGRAM COMPLETE: CPT | Performed by: NURSE PRACTITIONER

## 2024-01-08 RX ORDER — METOPROLOL SUCCINATE 25 MG/1
25 TABLET, EXTENDED RELEASE ORAL DAILY
COMMUNITY
End: 2024-01-08

## 2024-01-08 RX ORDER — OMEPRAZOLE 20 MG/1
20 TABLET, DELAYED RELEASE ORAL DAILY
COMMUNITY
End: 2024-01-08

## 2024-01-08 RX ORDER — SOTALOL HYDROCHLORIDE 120 MG/1
120 TABLET ORAL 2 TIMES DAILY
COMMUNITY
End: 2024-01-08

## 2024-01-08 ASSESSMENT — PAIN SCALES - GENERAL: PAINLEVEL: NO PAIN (0)

## 2024-01-08 NOTE — PROGRESS NOTES
[address], [address]  [facility] [address]  Phone: [phone]  Primary Provider: Tatiana Leahy  Pre-op Performing Provider: FRANCISCO QUISPE      PREOPERATIVE EVALUATION:  Today's date: 1/8/2024    Keshia is a 68 year old, presenting for the following:  No chief complaint on file.        Surgical Information:  General Information      Date: 1/9/2024 Time: 12:05 PM Status: Scheduled   Location:  OR Room: [place] [address] Service: Podiatry   Patient class: Same Day Surgery Case classification: Elective         Panel Information    Panel 1    Provider Role   Doron Michelle DPM Primary    Procedure Laterality Anesthesia   OPEN REDUCTION INTERNAL FIXATION LEFT ANKLE FRACTURE Left General with Block          Where patient plans to recover: At home with family  Fax number for surgical facility: Note does not need to be faxed, will be available electronically in Epic.    Assessment & Plan     The proposed surgical procedure is considered INTERMEDIATE risk.    (Z01.818) Preop general physical exam  (primary encounter diagnosis)  Comment: ok for surgery. No concerns today   Plan: EKG 12-lead complete w/read - Clinics            (S82.158A) Closed fracture of distal end of left fibula, unspecified fracture morphology, initial encounter  Comment:   Plan:       Possible Sleep Apnea: previous referral for sleep study. Has not scheduled         - No identified additional risk factors other than previously addressed    Antiplatelet or Anticoagulation Medication Instructions:   - Patient is on no antiplatelet or anticoagulation medications.    Additional Medication Instructions:  Take Levo morning of. Hold vitamins    RECOMMENDATION:  APPROVAL GIVEN to proceed with proposed procedure, without further diagnostic evaluation.            Subjective       HPI related to upcoming procedure:   Going for L ankle ORIF  Has been feeling well  No CP, SOB           1/8/2024    10:24 AM    Preop Questions   1. Have you ever had a heart attack or stroke? No   2. Have you ever had surgery on your heart or blood vessels, such as a stent placement, a coronary artery bypass, or surgery on an artery in your head, neck, heart, or legs? No   3. Do you have chest pain with activity? No   4. Do you have a history of  heart failure? No   5. Do you currently have a cold, bronchitis or symptoms of other infection? No   6. Do you have a cough, shortness of breath, or wheezing? No   7. Do you or anyone in your family have previous history of blood clots? No   8. Do you or does anyone in your family have a serious bleeding problem such as prolonged bleeding following surgeries or cuts? No   9. Have you ever had problems with anemia or been told to take iron pills? YES - years ago   10. Have you had any abnormal blood loss such as black, tarry or bloody stools, or abnormal vaginal bleeding? No   11. Have you ever had a blood transfusion? No   12. Are you willing to have a blood transfusion if it is medically needed before, during, or after your surgery? Yes   13. Have you or any of your relatives ever had problems with anesthesia? No   14. Do you have sleep apnea, excessive snoring or daytime drowsiness? Yes- snores. Referred for sleep study. Hasn't scheduled    15. Do you have any artifical heart valves or other implanted medical devices like a pacemaker, defibrillator, or continuous glucose monitor? No   16. Do you have artificial joints? No   17. Are you allergic to latex? No       Health Care Directive:  Patient has a Health Care Directive on file      Preoperative Review of :   reviewed - no record of controlled substances prescribed.      Status of Chronic Conditions:  See problem list for active medical problems.  Problems all longstanding and stable, except as noted/documented.  See ROS for pertinent symptoms related to these conditions.    Review of Systems  Constitutional, neuro, ENT, endocrine,  pulmonary, cardiac, gastrointestinal, genitourinary, musculoskeletal, integument and psychiatric systems are negative, except as otherwise noted.    Patient Active Problem List    Diagnosis Date Noted    Morbid obesity (H) 10/06/2022     Priority: Medium    Mild dysplasia of cervix 12/03/2019     Priority: Medium     10/2/17 NIL, +HR HPV, not 16/18. Plan 1 yr co-test  10/9/18 NIL, +HR HPV, not 16/18. Plan colp  10/26/18 [facility]--VIK 1. Plan: co-test in 1 year.   10/24/19 NIL, +HR HPV, not 16/18. Plan [place]  12/3/19 [facility]CC: VIK 1, Bx & endocervical polyp: benign. Plan 1 year cotest  10/26/20 ASCUS, +HR HPV, not 16/18. Plan 1 yr co-test  11/22/21 ASCUS, Neg HPV. Plan 1 yr co-test  11/28/22 NIL Pap, Neg HPV. Plan cotest in 3 years.       Class 1 obesity due to excess calories without serious comorbidity with body mass index (BMI) of 33.0 to 33.9 in adult 10/09/2018     Priority: Medium    Iron deficiency anemia, unspecified iron deficiency anemia type 10/02/2017     Priority: Medium    Gastroesophageal reflux disease without esophagitis 10/02/2017     Priority: Medium    Cervical high risk HPV (human papillomavirus) test positive 10/02/2017     Priority: Medium             Hypothyroidism, unspecified type 10/14/2016     Priority: Medium    Plantar fasciitis, bilateral 05/15/2015     Priority: Medium    Hiatal hernia 01/13/2015     Priority: Medium    Lumbago 08/08/2012     Priority: Medium    HYPERLIPIDEMIA LDL GOAL <160 10/31/2010     Priority: Medium    COLON POLYPS      Priority: Medium      Past Medical History:   Diagnosis Date    Anemia     Arthritis     Aleve occasionally    Benign neoplasm of colon     Cervical high risk HPV (human papillomavirus) test positive 10/02/2017 & 10/9/2018 & 10/24/2019    10/2/17 NIL, +HR HPV, not 16/18    Gastro-oesophageal reflux disease     Hiatal hernia     History of blood transfusion     Iron deficiency    Hypertrophy of breast     fibrous left breast- benign    Lumbago      Malignant neoplasm (H)     Mild dysplasia of cervix 12/03/2019    Obese     Pure hypercholesterolemia     Skin cancer     Thyroid disease     Hypothyroidism- Levothyroxine     Past Surgical History:   Procedure Laterality Date    BIOPSY OF BREAST, INCISIONAL  2004, 2001    left breast    COLONOSCOPY  10/08/2011    Procedure:COMBINED COLONOSCOPY, SINGLE BIOPSY/POLYPECTOMY BY BIOPSY; COLONOSCOPY; Surgeon:DARIANA RUIZ; Location: GI    COLONOSCOPY N/A 10/13/2015    Procedure: COLONOSCOPY;  Surgeon: Toi Sotelo MD;  Location:  GI    COLONOSCOPY Left 07/13/2020    Procedure: COLONOSCOPY;  Surgeon: Toi Sotelo MD;  Location:  GI    DILATION AND CURETTAGE, OPERATIVE HYSTEROSCOPY WITH MORCELLATOR, COMBINED N/A 08/09/2022    Procedure: Hystersocopy, dilation and curettage using morcellator under abdominal ultrasound guidance.;  Surgeon: Linda Schmitt MD;  Location:  OR    ESOPHAGOSCOPY, GASTROSCOPY, DUODENOSCOPY (EGD), COMBINED Left 07/13/2020    Procedure: ESOPHAGOGASTRODUODENOSCOPY, WITH BIOPSies using biopsy forcep;  Surgeon: Toi Sotelo MD;  Location:  GI    PHACOEMULSIFICATION CLEAR CORNEA WITH STANDARD INTRAOCULAR LENS IMPLANT  10/04/2012    Procedure: PHACOEMULSIFICATION CLEAR CORNEA WITH STANDARD INTRAOCULAR LENS IMPLANT;  RIGHT PHACOEMULSIFICATION CLEAR CORNEA WITH STANDARD INTRAOCULAR LENS IMPLANT;  Surgeon: Óscar Torrez MD;  Location: [facility]    PHACOEMULSIFICATION CLEAR CORNEA WITH STANDARD INTRAOCULAR LENS IMPLANT  11/01/2012    Procedure: PHACOEMULSIFICATION CLEAR CORNEA WITH STANDARD INTRAOCULAR LENS IMPLANT;  LEFT  PHACOEMULSIFICATION CLEAR CORNEA WITH STANDARD INTRAOCULAR LENS IMPLANT ;  Surgeon: Óscar Torrez MD;  Location: [facility]    SURGICAL HISTORY OF -       removal of skin cancer    [facility] EXPLORATORY OF ABDOMEN  1980    Laparotomy, thought she had ovarian cyst, but nothing was found     Current Outpatient Medications   Medication Sig Dispense Refill     "atorvastatin (LIPITOR) 10 MG tablet TAKE ONE TABLET BY MOUTH ONCE DAILY 90 tablet 0    fluticasone (FLONASE) 50 MCG/ACT nasal spray Spray 2 sprays into both nostrils daily 1 Package 2    levothyroxine (SYNTHROID/LEVOTHROID) 100 MCG tablet Take 1 tablet (100 mcg) by mouth daily 90 tablet 4    Multiple Vitamins-Minerals (MULTIVITAMIN WOMEN 50+) TABS Take 1 tablet by mouth daily         Allergies   Allergen Reactions    Influenza Virus Vaccine      Other reaction(s): Hypertension, flushing        Social History     Tobacco Use    Smoking status: Former     Packs/day: 0.50     Years: 10.00     Additional pack years: 0.00     Total pack years: 5.00     Types: Cigarettes     Quit date: 2003     Years since quittin.7    Smokeless tobacco: Never   Substance Use Topics    Alcohol use: Yes     Comment: very little     Family History   Problem Relation Age of Onset    Diabetes Father         adult    Neurologic Disorder Father         parkinsons    Cancer Father 95        lung    Heart Disease Father     C.A.D. Father     Heart Disease Mother     Hypertension Mother     Breast Cancer Mother     Thyroid Disease Mother     Lung Cancer Mother     Cancer Mother     Thyroid Disease Sister     Thyroid Disease Sister     Heart Disease Paternal Uncle         2 uncles MI    Colon Cancer No family hx of      History   Drug Use No         Objective     /84 (BP Location: Left arm, Patient Position: Sitting, Cuff Size: Adult Regular)   Pulse 79   Temp 97.7  F (36.5  C) (Temporal)   Resp 16   Ht 1.581 m (5' 2.25\")   LMP 2008   SpO2 92%   BMI 36.29 kg/m      Physical Exam    GENERAL APPEARANCE: healthy, alert and no distress     EYES: EOMI, PERRL     RESP: lungs clear to auscultation - no rales, rhonchi or wheezes     CV: regular rates and rhythm, normal S1 S2, no S3 or S4 and no murmur, click or rub     MS: extremities normal- no gross deformities noted, no evidence of inflammation in joints, FROM in all " extremities.     SKIN: no suspicious lesions or rashes     NEURO: Normal strength and tone, sensory exam grossly normal, mentation intact and speech normal     PSYCH: mentation appears normal. and affect normal/bright    Recent Labs   Lab Test 08/14/23  1502 07/24/23  1004 06/30/23  0943 06/15/23  1114 10/18/22  0938   HGB 16.1* 16.3*   < > 16.6*  --     313   < > 288  --      --   --  139 139   POTASSIUM 4.2  --   --  4.6 4.2   CR 0.74  --   --  0.69 0.61   A1C  --   --   --   --  5.6    < > = values in this interval not displayed.        Diagnostics:  No labs were ordered during this visit.   EKG: appears normal, NSR, normal axis, normal intervals, no acute ST/T changes c/w ischemia, no LVH by voltage criteria, incomplete Right Bundle Branch Block    Revised Cardiac Risk Index (RCRI):  The patient has the following serious cardiovascular risks for perioperative complications:   - No serious cardiac risks = 0 points     RCRI Interpretation: 0 points: Class I (very low risk - 0.4% complication rate)         Signed Electronically by: LENARD Bourgeois CNP  Copy of this evaluation report is provided to requesting physician.

## 2024-01-08 NOTE — TELEPHONE ENCOUNTER
Patient has been scheduled for surgery. Details are below.    Date of Surgery: 01/09/24    Approximate Arrival Time: SURGERY CENTER WILL CALL 3/4 DAYS PRIOR TO CONFIRM A TIME   Surgeon: Dr. Doron Michelle    Procedure: OPEN REDUCTION INTERNAL FIXATION LEFT ANKLE FRACTURE (Left)     Location: [facility] [place] [address]  Surgery Consult: na  PreOp Physical: 01/08/24  PostOp: 01/19/24 & 01/26/24  Packet Mailed/MyChart Sent: yes  Added to [place]: yes

## 2024-01-08 NOTE — PATIENT INSTRUCTIONS
Preparing for Your Surgery  Getting started  A nurse will call you to review your health history and instructions. They will give you an arrival time based on your scheduled surgery time. Please be ready to share:  Your doctor's clinic name and phone number  Your medical, surgical, and anesthesia history  A list of allergies and sensitivities  A list of medicines, including herbal treatments and over-the-counter drugs  Whether the patient has a legal guardian (ask how to send us the papers in advance)  Please tell us if you're pregnant--or if there's any chance you might be pregnant. Some surgeries may injure a fetus (unborn baby), so they require a pregnancy test. Surgeries that are safe for a fetus don't always need a test, and you can choose whether to have one.   If you have a child who's having surgery, please ask for a copy of Preparing for Your Child's Surgery.    Preparing for surgery  Within 10 to 30 days of surgery: Have a pre-op exam (sometimes called an H&P, or History and Physical). This can be done at a clinic or pre-operative center.  If you're having a , you may not need this exam. Talk to your care team.  At your pre-op exam, talk to your care team about all medicines you take. If you need to stop any medicines before surgery, ask when to start taking them again.  We do this for your safety. Many medicines can make you bleed too much during surgery. Some change how well surgery (anesthesia) drugs work.  Call your insurance company to let them know you're having surgery. (If you don't have insurance, call [phone].)  Call your clinic if there's any change in your health. This includes signs of a cold or flu (sore throat, runny nose, cough, rash, fever). It also includes a scrape or scratch near the surgery site.  If you have questions on the day of surgery, call your hospital or surgery center.  Eating and drinking guidelines  For your safety: Unless your surgeon tells you otherwise,  follow the guidelines below.  Eat and drink as usual until 8 hours before you arrive for surgery. After that, no food or milk.  Drink clear liquids until 2 hours before you arrive. These are liquids you can see through, like water, Gatorade, and Propel Water. They also include plain black coffee and tea (no cream or milk), candy, and breath mints. You can spit out gum when you arrive.  If you drink alcohol: Stop drinking it the night before surgery.  If your care team tells you to take medicine on the morning of surgery, it's okay to take it with a sip of water.  Preventing infection  Shower or bathe the night before and morning of your surgery. Follow the instructions your clinic gave you. (If no instructions, use regular soap.)  Don't shave or clip hair near your surgery site. We'll remove the hair if needed.  Don't smoke or vape the morning of surgery. You may chew nicotine gum up to 2 hours before surgery. A nicotine patch is okay.  Note: Some surgeries require you to completely quit smoking and nicotine. Check with your surgeon.  Your care team will make every effort to keep you safe from infection. We will:  Clean our hands often with soap and water (or an alcohol-based hand rub).  Clean the skin at your surgery site with a special soap that kills germs.  Give you a special gown to keep you warm. (Cold raises the risk of infection.)  Wear special hair covers, masks, gowns and gloves during surgery.  Give antibiotic medicine, if prescribed. Not all surgeries need antibiotics.  What to bring on the day of surgery  Photo ID and insurance card  Copy of your health care directive, if you have one  Glasses and hearing aids (bring cases)  You can't wear contacts during surgery  Inhaler and eye drops, if you use them (tell us about these when you arrive)  CPAP machine or breathing device, if you use them  A few personal items, if spending the night  If you have . . .  A pacemaker, ICD (cardiac defibrillator) or other  implant: Bring the ID card.  An implanted stimulator: Bring the remote control.  A legal guardian: Bring a copy of the certified (court-stamped) guardianship papers.  Please remove any jewelry, including body piercings. Leave jewelry and other valuables at home.  If you're going home the day of surgery  You must have a responsible adult drive you home. They should stay with you overnight as well.  If you don't have someone to stay with you, and you aren't safe to go home alone, we may keep you overnight. Insurance often won't pay for this.  After surgery  If it's hard to control your pain or you need more pain medicine, please call your surgeon's office.  Questions?   If you have any questions for your care team, list them here: _________________________________________________________________________________________________________________________________________________________________________ ____________________________________ ____________________________________ ____________________________________  For informational purposes only. Not to replace the advice of your health care provider. Copyright   2003, 2019 [employer] [facility]. All rights reserved. Clinically reviewed by Livia Wood MD. [employer] 051811 - REV 12/22.    How to Take Your Medication Before Surgery  Only take your levothyroxine the morning of surgery    I recommend 2000iu Vitamin D daily       SLEEP STUDY INFO:     Dear Vanessa Mckeon     Our records indicate that you have not scheduled an appointment for a Sleep Consult, as recommended by Toi Pastor MD. If you wish to schedule within [facility], we have several options to help you schedule your appointment:     Call [phone] Monday-Friday 7 am to 5 pm and we will be happy to assist you.            You can also request an appointment via [employer] if applicable or by visiting [email or web address]      If you have chosen to schedule elsewhere or if you have already  made an appointment, please disregard this letter.     If you have any questions or concerns regarding the information above, please contact [phone].        Sincerely,        TAMRA Webb

## 2024-01-09 ENCOUNTER — HOSPITAL ENCOUNTER (OUTPATIENT)
Facility: CLINIC | Age: 69
Discharge: HOME | End: 2024-01-09
Attending: PODIATRIST | Admitting: PODIATRIST
Payer: MEDICARE

## 2024-01-09 ENCOUNTER — APPOINTMENT (OUTPATIENT)
Dept: GENERAL RADIOLOGY | Facility: CLINIC | Age: 69
End: 2024-01-09
Attending: PODIATRIST
Payer: MEDICARE

## 2024-01-09 ENCOUNTER — ANESTHESIA (OUTPATIENT)
Dept: SURGERY | Facility: CLINIC | Age: 69
End: 2024-01-09
Payer: MEDICARE

## 2024-01-09 ENCOUNTER — ANESTHESIA EVENT (OUTPATIENT)
Dept: SURGERY | Facility: CLINIC | Age: 69
End: 2024-01-09
Payer: MEDICARE

## 2024-01-09 VITALS
BODY MASS INDEX: 35.01 KG/M2 | DIASTOLIC BLOOD PRESSURE: 70 MMHG | SYSTOLIC BLOOD PRESSURE: 138 MMHG | RESPIRATION RATE: 18 BRPM | TEMPERATURE: 97.6 F | HEART RATE: 85 BPM | WEIGHT: 205.1 LBS | OXYGEN SATURATION: 93 % | HEIGHT: 64 IN

## 2024-01-09 DIAGNOSIS — Z98.890 STATUS POST OPEN REDUCTION WITH INTERNAL FIXATION (ORIF) OF FRACTURE OF ANKLE: Primary | ICD-10-CM

## 2024-01-09 DIAGNOSIS — Z87.81 STATUS POST OPEN REDUCTION WITH INTERNAL FIXATION (ORIF) OF FRACTURE OF ANKLE: Primary | ICD-10-CM

## 2024-01-09 PROCEDURE — 250N000011 HC RX IP 250 OP 636: Performed by: SURGERY

## 2024-01-09 PROCEDURE — 370N000017 HC ANESTHESIA TECHNICAL FEE, PER MIN: Performed by: PODIATRIST

## 2024-01-09 PROCEDURE — 710N000009 HC RECOVERY PHASE 1, LEVEL 1, PER MIN: Performed by: PODIATRIST

## 2024-01-09 PROCEDURE — 999N000179 XR SURGERY CARM FLUORO LESS THAN 5 MIN W STILLS

## 2024-01-09 PROCEDURE — 250N000011 HC RX IP 250 OP 636: Performed by: PODIATRIST

## 2024-01-09 PROCEDURE — 27814 TREATMENT OF ANKLE FRACTURE: CPT | Mod: LT | Performed by: PODIATRIST

## 2024-01-09 PROCEDURE — 710N000012 HC RECOVERY PHASE 2, PER MINUTE: Performed by: PODIATRIST

## 2024-01-09 PROCEDURE — 250N000009 HC RX 250: Performed by: PODIATRIST

## 2024-01-09 PROCEDURE — 999N000065 XR ANKLE PORT LEFT 2 VIEWS: Mod: LT

## 2024-01-09 PROCEDURE — 250N000025 HC SEVOFLURANE, PER MIN: Performed by: PODIATRIST

## 2024-01-09 PROCEDURE — 360N000084 HC SURGERY LEVEL 4 W/ FLUORO, PER MIN: Performed by: PODIATRIST

## 2024-01-09 PROCEDURE — 258N000003 HC RX IP 258 OP 636: Performed by: SURGERY

## 2024-01-09 PROCEDURE — 250N000009 HC RX 250: Performed by: SURGERY

## 2024-01-09 PROCEDURE — 272N000001 HC OR GENERAL SUPPLY STERILE: Performed by: PODIATRIST

## 2024-01-09 PROCEDURE — 999N000141 HC STATISTIC PRE-PROCEDURE NURSING ASSESSMENT: Performed by: PODIATRIST

## 2024-01-09 PROCEDURE — C1713 ANCHOR/SCREW BN/BN,TIS/BN: HCPCS | Performed by: PODIATRIST

## 2024-01-09 DEVICE — LO-PRO LOCK SCRW,SS 2.7X 14MM
Type: IMPLANTABLE DEVICE | Site: ANKLE | Status: FUNCTIONAL
Brand: ARTHREX®

## 2024-01-09 DEVICE — LOCK DISTAL FIBULA PLT, SS LT, 4H
Type: IMPLANTABLE DEVICE | Site: ANKLE | Status: FUNCTIONAL
Brand: ARTHREX®

## 2024-01-09 DEVICE — SCREW BN 14MM 3.5MM SS CMPR KREULOCK AR-8835CL-14: Type: IMPLANTABLE DEVICE | Site: ANKLE | Status: FUNCTIONAL

## 2024-01-09 DEVICE — LO-PRO LOCK SCRW,SS 2.7X 10MM
Type: IMPLANTABLE DEVICE | Site: ANKLE | Status: FUNCTIONAL
Brand: ARTHREX®

## 2024-01-09 DEVICE — IMP SCREW BONE KREULOCK 3.5X12MM AR-8835CL-12: Type: IMPLANTABLE DEVICE | Site: ANKLE | Status: FUNCTIONAL

## 2024-01-09 DEVICE — LO-PRO SCRW TM,SS 3.5X 16MMCORT
Type: IMPLANTABLE DEVICE | Site: ANKLE | Status: FUNCTIONAL
Brand: ARTHREX®

## 2024-01-09 RX ORDER — HYDROMORPHONE HCL IN WATER/PF 6 MG/30 ML
0.2 PATIENT CONTROLLED ANALGESIA SYRINGE INTRAVENOUS EVERY 5 MIN PRN
Status: DISCONTINUED | OUTPATIENT
Start: 2024-01-09 | End: 2024-01-09 | Stop reason: HOSPADM

## 2024-01-09 RX ORDER — FENTANYL CITRATE 0.05 MG/ML
25 INJECTION, SOLUTION INTRAMUSCULAR; INTRAVENOUS EVERY 5 MIN PRN
Status: DISCONTINUED | OUTPATIENT
Start: 2024-01-09 | End: 2024-01-09 | Stop reason: HOSPADM

## 2024-01-09 RX ORDER — HYDROCODONE BITARTRATE AND ACETAMINOPHEN 5; 325 MG/1; MG/1
1 TABLET ORAL
Status: DISCONTINUED | OUTPATIENT
Start: 2024-01-09 | End: 2024-01-09 | Stop reason: HOSPADM

## 2024-01-09 RX ORDER — ONDANSETRON 2 MG/ML
4 INJECTION INTRAMUSCULAR; INTRAVENOUS EVERY 30 MIN PRN
Status: DISCONTINUED | OUTPATIENT
Start: 2024-01-09 | End: 2024-01-09 | Stop reason: HOSPADM

## 2024-01-09 RX ORDER — FENTANYL CITRATE 50 UG/ML
INJECTION, SOLUTION INTRAMUSCULAR; INTRAVENOUS PRN
Status: DISCONTINUED | OUTPATIENT
Start: 2024-01-09 | End: 2024-01-09

## 2024-01-09 RX ORDER — FENTANYL CITRATE 0.05 MG/ML
50 INJECTION, SOLUTION INTRAMUSCULAR; INTRAVENOUS EVERY 5 MIN PRN
Status: DISCONTINUED | OUTPATIENT
Start: 2024-01-09 | End: 2024-01-09 | Stop reason: HOSPADM

## 2024-01-09 RX ORDER — CEFAZOLIN SODIUM/WATER 2 G/20 ML
2 SYRINGE (ML) INTRAVENOUS
Status: COMPLETED | OUTPATIENT
Start: 2024-01-09 | End: 2024-01-09

## 2024-01-09 RX ORDER — HYDROMORPHONE HCL IN WATER/PF 6 MG/30 ML
0.4 PATIENT CONTROLLED ANALGESIA SYRINGE INTRAVENOUS EVERY 5 MIN PRN
Status: DISCONTINUED | OUTPATIENT
Start: 2024-01-09 | End: 2024-01-09 | Stop reason: HOSPADM

## 2024-01-09 RX ORDER — PROPOFOL 10 MG/ML
INJECTION, EMULSION INTRAVENOUS PRN
Status: DISCONTINUED | OUTPATIENT
Start: 2024-01-09 | End: 2024-01-09

## 2024-01-09 RX ORDER — HYDROXYZINE HYDROCHLORIDE 10 MG/1
TABLET, FILM COATED ORAL
Qty: 20 TABLET | Refills: 0 | Status: SHIPPED | OUTPATIENT
Start: 2024-01-09 | End: 2024-05-07

## 2024-01-09 RX ORDER — ONDANSETRON 2 MG/ML
INJECTION INTRAMUSCULAR; INTRAVENOUS PRN
Status: DISCONTINUED | OUTPATIENT
Start: 2024-01-09 | End: 2024-01-09

## 2024-01-09 RX ORDER — HYDROCODONE BITARTRATE AND ACETAMINOPHEN 5; 325 MG/1; MG/1
TABLET ORAL
Qty: 20 TABLET | Refills: 0 | Status: SHIPPED | OUTPATIENT
Start: 2024-01-09 | End: 2024-05-07

## 2024-01-09 RX ORDER — CEFAZOLIN SODIUM/WATER 2 G/20 ML
2 SYRINGE (ML) INTRAVENOUS SEE ADMIN INSTRUCTIONS
Status: DISCONTINUED | OUTPATIENT
Start: 2024-01-09 | End: 2024-01-09 | Stop reason: HOSPADM

## 2024-01-09 RX ORDER — CHOLECALCIFEROL (VITAMIN D3) 50 MCG
1 TABLET ORAL DAILY
Qty: 90 TABLET | Refills: 0 | Status: SHIPPED | OUTPATIENT
Start: 2024-01-09

## 2024-01-09 RX ORDER — IBUPROFEN 600 MG/1
TABLET, FILM COATED ORAL
Qty: 28 TABLET | Refills: 0 | Status: SHIPPED | OUTPATIENT
Start: 2024-01-09

## 2024-01-09 RX ORDER — FENTANYL CITRATE 0.05 MG/ML
25 INJECTION, SOLUTION INTRAMUSCULAR; INTRAVENOUS
Status: DISCONTINUED | OUTPATIENT
Start: 2024-01-09 | End: 2024-01-09 | Stop reason: HOSPADM

## 2024-01-09 RX ORDER — LIDOCAINE HYDROCHLORIDE 20 MG/ML
INJECTION, SOLUTION INFILTRATION; PERINEURAL PRN
Status: DISCONTINUED | OUTPATIENT
Start: 2024-01-09 | End: 2024-01-09

## 2024-01-09 RX ORDER — MAGNESIUM HYDROXIDE 1200 MG/15ML
LIQUID ORAL PRN
Status: DISCONTINUED | OUTPATIENT
Start: 2024-01-09 | End: 2024-01-09 | Stop reason: HOSPADM

## 2024-01-09 RX ORDER — ONDANSETRON 4 MG/1
4 TABLET, ORALLY DISINTEGRATING ORAL EVERY 30 MIN PRN
Status: DISCONTINUED | OUTPATIENT
Start: 2024-01-09 | End: 2024-01-09 | Stop reason: HOSPADM

## 2024-01-09 RX ORDER — PROPOFOL 10 MG/ML
INJECTION, EMULSION INTRAVENOUS CONTINUOUS PRN
Status: DISCONTINUED | OUTPATIENT
Start: 2024-01-09 | End: 2024-01-09

## 2024-01-09 RX ORDER — SODIUM CHLORIDE, SODIUM LACTATE, POTASSIUM CHLORIDE, CALCIUM CHLORIDE 600; 310; 30; 20 MG/100ML; MG/100ML; MG/100ML; MG/100ML
INJECTION, SOLUTION INTRAVENOUS CONTINUOUS
Status: DISCONTINUED | OUTPATIENT
Start: 2024-01-09 | End: 2024-01-09 | Stop reason: HOSPADM

## 2024-01-09 RX ORDER — DEXAMETHASONE SODIUM PHOSPHATE 4 MG/ML
INJECTION, SOLUTION INTRA-ARTICULAR; INTRALESIONAL; INTRAMUSCULAR; INTRAVENOUS; SOFT TISSUE PRN
Status: DISCONTINUED | OUTPATIENT
Start: 2024-01-09 | End: 2024-01-09

## 2024-01-09 RX ORDER — SODIUM CHLORIDE, SODIUM LACTATE, POTASSIUM CHLORIDE, CALCIUM CHLORIDE 600; 310; 30; 20 MG/100ML; MG/100ML; MG/100ML; MG/100ML
INJECTION, SOLUTION INTRAVENOUS CONTINUOUS PRN
Status: DISCONTINUED | OUTPATIENT
Start: 2024-01-09 | End: 2024-01-09

## 2024-01-09 RX ADMIN — ROPIVACAINE HYDROCHLORIDE 10 ML: 5 INJECTION, SOLUTION EPIDURAL; INFILTRATION; PERINEURAL at 11:14

## 2024-01-09 RX ADMIN — SODIUM CHLORIDE, POTASSIUM CHLORIDE, SODIUM LACTATE AND CALCIUM CHLORIDE: 600; 310; 30; 20 INJECTION, SOLUTION INTRAVENOUS at 11:20

## 2024-01-09 RX ADMIN — PROPOFOL 200 MG: 10 INJECTION, EMULSION INTRAVENOUS at 11:24

## 2024-01-09 RX ADMIN — FENTANYL CITRATE 50 MCG: 50 INJECTION INTRAMUSCULAR; INTRAVENOUS at 11:24

## 2024-01-09 RX ADMIN — Medication 2 G: at 11:20

## 2024-01-09 RX ADMIN — ONDANSETRON 4 MG: 2 INJECTION INTRAMUSCULAR; INTRAVENOUS at 12:21

## 2024-01-09 RX ADMIN — PROPOFOL 30 MCG/KG/MIN: 10 INJECTION, EMULSION INTRAVENOUS at 11:29

## 2024-01-09 RX ADMIN — SUGAMMADEX 200 MG: 100 INJECTION, SOLUTION INTRAVENOUS at 12:26

## 2024-01-09 RX ADMIN — MIDAZOLAM 1 MG: 1 INJECTION INTRAMUSCULAR; INTRAVENOUS at 11:02

## 2024-01-09 RX ADMIN — SUCCINYLCHOLINE CHLORIDE 100 MG: 20 INJECTION, SOLUTION INTRAMUSCULAR; INTRAVENOUS; PARENTERAL at 11:24

## 2024-01-09 RX ADMIN — LIDOCAINE HYDROCHLORIDE 100 MG: 20 INJECTION, SOLUTION INFILTRATION; PERINEURAL at 11:24

## 2024-01-09 RX ADMIN — ROCURONIUM BROMIDE 30 MG: 50 INJECTION, SOLUTION INTRAVENOUS at 11:34

## 2024-01-09 RX ADMIN — FENTANYL CITRATE 50 MCG: 50 INJECTION INTRAMUSCULAR; INTRAVENOUS at 11:43

## 2024-01-09 RX ADMIN — DEXAMETHASONE SODIUM PHOSPHATE 4 MG: 4 INJECTION, SOLUTION INTRA-ARTICULAR; INTRALESIONAL; INTRAMUSCULAR; INTRAVENOUS; SOFT TISSUE at 11:29

## 2024-01-09 ASSESSMENT — ACTIVITIES OF DAILY LIVING (ADL)
ADLS_ACUITY_SCORE: 35
ADLS_ACUITY_SCORE: 33
ADLS_ACUITY_SCORE: 35
ADLS_ACUITY_SCORE: 35

## 2024-01-09 NOTE — ANESTHESIA PREPROCEDURE EVALUATION
Anesthesia Pre-Procedure Evaluation    Patient: Vanessa Mckeon   MRN: [id number] : 1955        Procedure : Procedure(s):  OPEN REDUCTION INTERNAL FIXATION LEFT ANKLE FRACTURE          Past Medical History:   Diagnosis Date    Anemia     Arthritis     Aleve occasionally    Benign neoplasm of colon     Cervical high risk HPV (human papillomavirus) test positive 10/02/2017 & 10/9/2018 & 10/24/2019    10/2/17 NIL, +HR HPV, not 16/18    Gastro-oesophageal reflux disease     Hiatal hernia     History of blood transfusion     Iron deficiency    Hypertrophy of breast     fibrous left breast- benign    Lumbago     Malignant neoplasm (H)     Mild dysplasia of cervix 2019    Obese     Pure hypercholesterolemia     Skin cancer     Thyroid disease     Hypothyroidism- Levothyroxine      Past Surgical History:   Procedure Laterality Date    BIOPSY OF BREAST, INCISIONAL  ,     left breast    COLONOSCOPY  10/08/2011    Procedure:COMBINED COLONOSCOPY, SINGLE BIOPSY/POLYPECTOMY BY BIOPSY; COLONOSCOPY; Surgeon:DARIANA RUIZ; Location: GI    COLONOSCOPY N/A 10/13/2015    Procedure: COLONOSCOPY;  Surgeon: Toi Sotelo MD;  Location:  GI    COLONOSCOPY Left 2020    Procedure: COLONOSCOPY;  Surgeon: Toi Sotelo MD;  Location:  GI    DILATION AND CURETTAGE, OPERATIVE HYSTEROSCOPY WITH MORCELLATOR, COMBINED N/A 2022    Procedure: Hystersocopy, dilation and curettage using morcellator under abdominal ultrasound guidance.;  Surgeon: Linda Schmitt MD;  Location:  OR    ESOPHAGOSCOPY, GASTROSCOPY, DUODENOSCOPY (EGD), COMBINED Left 2020    Procedure: ESOPHAGOGASTRODUODENOSCOPY, WITH BIOPSies using biopsy forcep;  Surgeon: Toi Sotelo MD;  Location:  GI    PHACOEMULSIFICATION CLEAR CORNEA WITH STANDARD INTRAOCULAR LENS IMPLANT  10/04/2012    Procedure: PHACOEMULSIFICATION CLEAR CORNEA WITH STANDARD INTRAOCULAR LENS IMPLANT;  RIGHT PHACOEMULSIFICATION CLEAR  CORNEA WITH STANDARD INTRAOCULAR LENS IMPLANT;  Surgeon: Óscar Torrez MD;  Location: [facility]    PHACOEMULSIFICATION CLEAR CORNEA WITH STANDARD INTRAOCULAR LENS IMPLANT  2012    Procedure: PHACOEMULSIFICATION CLEAR CORNEA WITH STANDARD INTRAOCULAR LENS IMPLANT;  LEFT  PHACOEMULSIFICATION CLEAR CORNEA WITH STANDARD INTRAOCULAR LENS IMPLANT ;  Surgeon: Óscar Torrez MD;  Location: [facility]    SURGICAL HISTORY OF -       removal of skin cancer    ZZ EXPLORATORY OF ABDOMEN  1980    Laparotomy, thought she had ovarian cyst, but nothing was found      No Known Allergies   Social History     Tobacco Use    Smoking status: Former     Packs/day: 0.50     Years: 10.00     Additional pack years: 0.00     Total pack years: 5.00     Types: Cigarettes     Quit date: 2003     Years since quittin.7    Smokeless tobacco: Never   Substance Use Topics    Alcohol use: Yes     Comment: holidays      Wt Readings from Last 1 Encounters:   24 93 kg (205 lb 1.6 oz)        Anesthesia Evaluation            ROS/MED HX  ENT/Pulmonary:     (+)           allergic rhinitis,                             Neurologic:       Cardiovascular:     (+) Dyslipidemia - -   -  - -                                      METS/Exercise Tolerance:     Hematologic:       Musculoskeletal:   (+)     fracture, Fracture location: LLE,         GI/Hepatic:     (+) GERD,     hiatal hernia,              Renal/Genitourinary:       Endo:     (+)          thyroid problem,     Obesity,       Psychiatric/Substance Use:       Infectious Disease:       Malignancy:       Other:            Physical Exam    Airway        Mallampati: II   TM distance: > 3 FB   Neck ROM: full   Mouth opening: > 3 cm    Respiratory Devices and Support         Dental       (+) Minor Abnormalities - some fillings, tiny chips      Cardiovascular   cardiovascular exam normal          Pulmonary   pulmonary exam normal                OUTSIDE LABS:  CBC:   Lab Results   Component Value  "Date    WBC 6.7 08/14/2023    WBC 8.4 07/24/2023    HGB 16.1 (H) 08/14/2023    HGB 16.3 (H) 07/24/2023    HCT 49.4 (H) 08/14/2023    HCT 50.7 (H) 07/24/2023     08/14/2023     07/24/2023     BMP:   Lab Results   Component Value Date     08/14/2023     06/15/2023    POTASSIUM 4.2 08/14/2023    POTASSIUM 4.6 06/15/2023    CHLORIDE 107 08/14/2023    CHLORIDE 105 06/15/2023    CO2 22 08/14/2023    CO2 22 06/15/2023    BUN 10 08/14/2023    BUN 13.2 06/15/2023    CR 0.74 08/14/2023    CR 0.69 06/15/2023    GLC 99 08/14/2023    GLC 90 06/15/2023     COAGS: No results found for: \"PTT\", \"INR\", \"FIBR\"  POC: No results found for: \"BGM\", \"HCG\", \"HCGS\"  HEPATIC:   Lab Results   Component Value Date    ALBUMIN 3.8 08/14/2023    PROTTOTAL 7.0 08/14/2023    ALT 17 08/14/2023    AST 19 08/14/2023    ALKPHOS 86 08/14/2023    BILITOTAL 0.3 08/14/2023     OTHER:   Lab Results   Component Value Date    A1C 5.6 10/18/2022    SONIA 9.4 08/14/2023    MAG 2.3 11/10/2016    TSH 1.80 06/15/2023    T4 1.04 10/18/2022    T3 124 10/14/2016    CRP 0.20 01/03/2003       Anesthesia Plan    ASA Status:  2    NPO Status:  NPO Appropriate    Anesthesia Type: General.     - Airway: LMA   Induction: Intravenous, Propofol.   Maintenance: Balanced.        Consents    Anesthesia Plan(s) and associated risks, benefits, and realistic alternatives discussed. Questions answered and patient/representative(s) expressed understanding.     - Discussed: Risks, Benefits and Alternatives for the PROCEDURE were discussed     - Discussed with:  Patient            Postoperative Care    Pain management: IV analgesics, Peripheral nerve block (Single Shot), Multi-modal analgesia.   PONV prophylaxis: Ondansetron (or other 5HT-3), Dexamethasone or Solumedrol, Background Propofol Infusion     Comments:               Abdias Lutz MD    I have reviewed the pertinent notes and labs in the chart from the past 30 days and (re)examined the patient.  " "Any updates or changes from those notes are reflected in this note.              # Obesity: Estimated body mass index is 35.21 kg/m  as calculated from the following:    Height as of this encounter: 1.626 m (5' 4\").    Weight as of this encounter: 93 kg (205 lb 1.6 oz).      "

## 2024-01-09 NOTE — OP NOTE
Foot & Ankle Surgery  January 9, 2024    Surgeon:   Doron Michelle DPM FACFAS FACFAOM    Assistant: None    Pre-op diagnosis:  Bimalleolar equivalent left ankle fracture with mildly displaced distal fibular fracture and widening of the medial gutter    Post-op diagnosis:  Same    Procedure:  Open reduction internal fixation left ankle fracture    Pathology:  None    Anesthesia: General endotracheal with popliteal/saphenous nerve blocks    Hemostasis: Well-padded pneumatic thigh tourniquet    EBL: Less than 5 cc    Materials:    Arthrex 3.5 mm cortical lag screw  Arthrex contoured distal fibular plate with locking 2.7 and 3.5 millimeter screws    Injectables: Preoperative popliteal and saphenous nerve blocks by anesthesia    Complications: None apparent    Intra-operative findings: Anatomic reduction of the fibula with stable fixation.  No tib-fib diastases with hook test and internal/external talar rotation.  Anatomic reduction of the mortise    Indications for procedure: Left ankle fracture with mildly displaced fibular fracture and widening of the medial gutter    After obtaining verbal consent, the patient received preoperative popliteal and saphenous nerve block by anesthesia.  She was transferred to the operating room and placed in supine position on the operating table.  She was placed under general anesthesia.  The left lower extremity was then prepped and draped in normal aseptic fashion, exsanguinated, and the well-padded pneumatic thigh tourniquet was inflated.  Patient, procedure and site were correct identified by or staff.    Procedure 1: Open reduction internal fixation left ankle fracture -attention was directed to the lateral ankle where the pertinent anatomy was drawn out.  A 10 cm linear incision was carried down to subcutaneous tissue over the fibula.  Bleeding vessels were electrocauterized as necessary.  The incision was bluntly carried down to subcutaneous fat and subperiosteal  dissection was performed after the sural nerve was not identified.  The fracture was identified and exaggerated and the fracture hematoma was evacuated.  Distal traction was applied to the ankle.  The fibular fracture was reduced and temporarily fixated with a guidepin and 2 bone reduction clamps.  Imaging showed anatomic reduction of the fibular fracture as well as anatomic alignment of the mortise without residual widening.  Using standard AO technique, the fibular fracture was permanently fixated using a 3.5 mm lag screw with good purchase and compression.  The fracture site was further stabilized using the contoured locking fibular plate.  Final images demonstrated anatomic reduction of the fracture (the fracture line was no longer readily visible) and anatomic alignment of the mortise without residual medial widening.  The cotton hook test and internal/external talar rotation under live image intensification showed no tib-fib diastases or widening of the medial gutter    The wound was flushed with copious amounts normal saline.  Layered closure was performed with 3-0 Vicryl, 4-0 Vicryl and skin staples.  A dry sterile dressing was applied and the patient was transferred to the PACU with vital signs stable and vascular status intact.  They appeared to tolerate the procedure well without complications.    Doron Michelle DPM FACFAS FACFAOM  Podiatric Foot & Ankle Surgeon  [facility]  [phone]

## 2024-01-09 NOTE — PROGRESS NOTES
S:  We received and order for a tall CAMwalker from Dr Michelle.  DX:  Post op   O:  I arrived at [facility] as the patient was getting ready to leave and she already has a tall Aircast on.  The patient says they brought their own with.  A:  N/A  P:  Nothing provided today due to patient bringing their own Aircast today.  Vern SINGH

## 2024-01-09 NOTE — OR NURSING
During call to OR, pt family verbalizes pt had coffee with cream this am. Dr. Lutz, [facility] notified.

## 2024-01-09 NOTE — ANESTHESIA POSTPROCEDURE EVALUATION
Patient: Vanessa Mckeon    Procedure: Procedure(s):  OPEN REDUCTION INTERNAL FIXATION LEFT ANKLE FRACTURE       Anesthesia Type:  General    Note:  Disposition: Outpatient   Postop Pain Control: Uneventful            Sign Out: Well controlled pain   PONV: No   Neuro/Psych: Uneventful            Sign Out: Acceptable/Baseline neuro status   Airway/Respiratory: Uneventful            Sign Out: Acceptable/Baseline resp. status   CV/Hemodynamics: Uneventful            Sign Out: Acceptable CV status   Other NRE: NONE   DID A NON-ROUTINE EVENT OCCUR? No           Last vitals:  Vitals Value Taken Time   /76 01/09/24 1315   Temp 36.3  C (97.3  F) 01/09/24 1300   Pulse 87 01/09/24 1318   Resp 12 01/09/24 1318   SpO2 90 % 01/09/24 1318   Vitals shown include unfiled device data.    Electronically Signed By: Abdias Lutz MD  January 9, 2024  1:19 PM

## 2024-01-09 NOTE — ANESTHESIA PROCEDURE NOTES
Femoral (via adductor canal) Procedure Note    Pre-Procedure   Staff -        Anesthesiologist:  Abdias Lutz MD       Performed By: anesthesiologist       Location: pre-op       Pre-Anesthestic Checklist: patient identified, IV checked, site marked, risks and benefits discussed, informed consent, monitors and equipment checked, pre-op evaluation, at physician/surgeon's request and post-op pain management  Timeout:       Correct Patient: Yes        Correct Procedure: Yes        Correct Site: Yes        Correct Position: Yes        Correct Laterality: Yes        Site Marked: Yes  Procedure Documentation  Procedure: Femoral (via adductor canal)       Laterality: left       Patient Position: supine       Patient Prep/Sterile Barriers: sterile gloves, mask, patient draped       Skin prep: Chloraprep       Local skin infiltrated with 3 mL of 1% lidocaine.        Needle Type: insulated       Needle Gauge: 21.        Needle Length (Inches): 3.5        Ultrasound guided       1. Ultrasound was used to identify targeted nerve, plexus, vascular marker, or fascial plane and place a needle adjacent to it in real-time.       2. Ultrasound was used to visualize the spread of anesthetic in close proximity to the above referenced structure.       3. A permanent image is entered into the patient's record.       4. The visualized anatomic structures appeared normal.       5. There were no apparent abnormal pathologic findings.    Assessment/Narrative         The placement was negative for: blood aspirated, painful injection and site bleeding       Paresthesias: No.       Bolus given via needle..        Secured via.        Insertion/Infusion Method: Single Shot       Complications: none       Injection made incrementally with aspirations every 5 mL.    Medication(s) Administered   Ropivacaine 0.5% w/ 1:400K Epi (Injection) - Injection   10 mL - 1/9/2024 11:14:00 AM   Comments:  The surgeon has given a verbal order  "transferring care of this patient to me for the performance of a regional analgesia block for post-op pain control. It is requested of me because I am uniquely trained and qualified to perform this block and the surgeon is neither trained nor qualified to perform this procedure.          FOR [facility] ([facility]) ONLY:   Pain Team Contact information: please page the Pain Team Via [employer]. Search \"Pain\". During daytime hours, please page the attending first. At night please page the resident first.      "

## 2024-01-09 NOTE — ANESTHESIA PROCEDURE NOTES
Airway       Patient location during procedure: OR       Procedure Start/Stop Times: 1/9/2024 11:26 AM  Staff -        CRNA: Aparna Cook APRN CRNA       Performed By: CRNA  Consent for Airway        Urgency: elective  Indications and Patient Condition       Indications for airway management: rosario-procedural       Induction type:intravenous       Mask difficulty assessment: 0 - not attempted    Final Airway Details       Final airway type: endotracheal airway       Successful airway: ETT - single  Endotracheal Airway Details        ETT size (mm): 7.0       Cuffed: yes       Successful intubation technique: video laryngoscopy       VL Blade Size: Glidescope 3       Grade View of Cords: 1       Adjucts: stylet       Position: Left       Measured from: gums/teeth       Secured at (cm): 22       Bite block used: None    Post intubation assessment        Placement verified by: capnometry, equal breath sounds and chest rise        Number of attempts at approach: 1       Number of other approaches attempted: 0       Secured with: tape       Ease of procedure: easy       Dentition: Intact and Unchanged    Medication(s) Administered   Medication Administration Time: 1/9/2024 11:26 AM

## 2024-01-09 NOTE — ANESTHESIA PROCEDURE NOTES
Sciatic Procedure Note    Pre-Procedure   Staff -        Anesthesiologist:  Abdias Lutz MD       Performed By: anesthesiologist       Location: pre-op       Pre-Anesthestic Checklist: patient identified, IV checked, site marked, risks and benefits discussed, informed consent, monitors and equipment checked, pre-op evaluation, at physician/surgeon's request and post-op pain management  Timeout:       Correct Patient: Yes        Correct Procedure: Yes        Correct Site: Yes        Correct Position: Yes        Correct Laterality: Yes        Site Marked: Yes  Procedure Documentation  Procedure: Sciatic       Laterality: left       Patient Position: supine       Patient Prep/Sterile Barriers: sterile gloves, mask, patient draped       Skin prep: Chloraprep       Local skin infiltrated with 3 mL of 1% lidocaine.  (popliteal approach).       Needle Type: insulated       Needle Gauge: 21.        Needle Length (Inches): 3.5        Ultrasound guided       1. Ultrasound was used to identify targeted nerve, plexus, vascular marker, or fascial plane and place a needle adjacent to it in real-time.       2. Ultrasound was used to visualize the spread of anesthetic in close proximity to the above referenced structure.       3. A permanent image is entered into the patient's record.       4. The visualized anatomic structures appeared normal.       5. There were no apparent abnormal pathologic findings.    Assessment/Narrative         The placement was negative for: blood aspirated, painful injection and site bleeding       Paresthesias: No.       Bolus given via needle..        Secured via.        Insertion/Infusion Method: Single Shot       Complications: none       Injection made incrementally with aspirations every 5 mL.    Medication(s) Administered   Ropivacaine 0.5% w/ 1:400K Epi (Injection) - Injection   30 mL - 1/9/2024 11:14:00 AM   Comments:  The surgeon has given a verbal order transferring care of this  "patient to me for the performance of a regional analgesia block for post-op pain control. It is requested of me because I am uniquely trained and qualified to perform this block and the surgeon is neither trained nor qualified to perform this procedure.          FOR [facility] ([facility]) ONLY:   Pain Team Contact information: please page the Pain Team Via [employer]. Search \"Pain\". During daytime hours, please page the attending first. At night please page the resident first.      "

## 2024-01-09 NOTE — ANESTHESIA CARE TRANSFER NOTE
Patient: Vanessa Mckeon    Procedure: Procedure(s):  OPEN REDUCTION INTERNAL FIXATION LEFT ANKLE FRACTURE       Diagnosis: Closed fracture of distal end of left fibula, unspecified fracture morphology, initial encounter [S86.375L]  Diagnosis Additional Information: No value filed.    Anesthesia Type:   General     Note:    Oropharynx: oropharynx clear of all foreign objects and spontaneously breathing  Level of Consciousness: awake and drowsy  Oxygen Supplementation: face mask  Level of Supplemental Oxygen (L/min / FiO2): 6  Independent Airway: airway patency satisfactory and stable  Dentition: dentition unchanged  Vital Signs Stable: post-procedure vital signs reviewed and stable  Report to RN Given: handoff report given  Patient transferred to: Phase II    Handoff Report: Identifed the Patient, Identified the Reponsible Provider, Reviewed the pertinent medical history, Discussed the surgical course, Reviewed Intra-OP anesthesia mangement and issues during anesthesia, Set expectations for post-procedure period and Allowed opportunity for questions and acknowledgement of understanding      Vitals:  Vitals Value Taken Time   /86 01/09/24 1238   Temp     Pulse 82 01/09/24 1242   Resp 16 01/09/24 1242   SpO2 98 % 01/09/24 1242   Vitals shown include unfiled device data.    Electronically Signed By: LENARD Quezada CRNA  January 9, 2024  12:44 PM

## 2024-01-09 NOTE — DISCHARGE INSTRUCTIONS
Same Day Surgery Discharge Instructions for  Sedation and General Anesthesia     It's not unusual to feel dizzy, light-headed or faint for up to 24 hours after surgery or while taking pain medication.  If you have these symptoms: sit for a few minutes before standing and have someone assist you when you get up to walk or use the bathroom.    You should rest and relax for the next 24 hours. We recommend you make arrangements to have an adult stay with you for at least 24 hours after your discharge.  Avoid hazardous and strenuous activity.    DO NOT DRIVE any vehicle or operate mechanical equipment for 24 hours following the end of your surgery.  Even though you may feel normal, your reactions may be affected by the medication you have received.    Do not drink alcoholic beverages for 24 hours following surgery.     Slowly progress to your regular diet as you feel able. It's not unusual to feel nauseated and/or vomit after receiving anesthesia.  If you develop these symptoms, drink clear liquids (apple juice, ginger ale, broth, 7-up, etc. ) until you feel better.  If your nausea and vomiting persists for 24 hours, please notify your surgeon.      All narcotic pain medications, along with inactivity and anesthesia, can cause constipation. Drinking plenty of liquids and increasing fiber intake will help.    For any questions of a medical nature, call your surgeon.    Do not make important decisions for 24 hours.    If you had general anesthesia, you may have a sore throat for a couple of days related to the breathing tube used during surgery.  You may use Cepacol lozenges to help with this discomfort.  If it worsens or if you develop a fever, contact your surgeon.     If you feel your pain is not well managed with the pain medications prescribed by your surgeon, please contact your surgeon's office to let them know so they can address your concerns.         [facility]      DISCHARGE INSTRUCTIONS FOLLOWING  "  REGIONAL BLOCK ANESTHESIA    Numbness or lack of feeling in the arm/leg that was operated may last up to 24 hours.  The average time is usually 10-15 hours.  You may not be able to lift or move the arm or leg where the operation was by itself during that time.  Long-acting local anesthetic medicines were used to give you long-lasting pain relief.  Wear a sling until your arm is completely \"awake\"  Avoid bumping your arm, leg or foot while it is numb  Avoid extremes of hot or cold while it is numb  Remain quiet and restful the day of surgery.  Resume normal activities gradually over the next day or so as advise by your surgeon.  Do not drive or operate  Any machinery until your extremity is full  \"awake\"        You will have a tingling and prickly sensation in your arm/leg as the feeling begins to return; you can also expect some discomfort. The amount of discomfort is unpredictable, but if you have more pain that can be controlled with the pain medication you received, you should contact your surgeon.  Start to take your pain pills as soon as you start to feel any discomfort or pain.              **If you have questions or concerns about your procedure,   call Dr. Michelle at [phone]**  "

## 2024-01-16 ENCOUNTER — PATIENT OUTREACH (OUTPATIENT)
Dept: GASTROENTEROLOGY | Facility: CLINIC | Age: 69
End: 2024-01-16
Payer: MEDICARE

## 2024-01-19 ENCOUNTER — OFFICE VISIT (OUTPATIENT)
Dept: PODIATRY | Facility: CLINIC | Age: 69
End: 2024-01-19
Payer: MEDICARE

## 2024-01-19 VITALS — SYSTOLIC BLOOD PRESSURE: 116 MMHG | BODY MASS INDEX: 35.19 KG/M2 | DIASTOLIC BLOOD PRESSURE: 80 MMHG | WEIGHT: 205 LBS

## 2024-01-19 DIAGNOSIS — Z87.81 STATUS POST OPEN REDUCTION WITH INTERNAL FIXATION (ORIF) OF FRACTURE OF ANKLE: ICD-10-CM

## 2024-01-19 DIAGNOSIS — S82.832A CLOSED FRACTURE OF DISTAL END OF LEFT FIBULA, UNSPECIFIED FRACTURE MORPHOLOGY, INITIAL ENCOUNTER: Primary | ICD-10-CM

## 2024-01-19 DIAGNOSIS — Z98.890 STATUS POST OPEN REDUCTION WITH INTERNAL FIXATION (ORIF) OF FRACTURE OF ANKLE: ICD-10-CM

## 2024-01-19 PROCEDURE — 99024 POSTOP FOLLOW-UP VISIT: CPT | Performed by: PODIATRIST

## 2024-01-19 NOTE — LETTER
"    1/19/2024         RE: Vanessa Mckeon  [address]  [place] [address]        Dear Colleague,    Thank you for referring your patient, Vanessa Mckeon, to the [facility]. Please see a copy of my visit note below.    Foot & Ankle Surgery  January 19, 2024    S:  Patient in today sp open reduction internal fixation left ankle/distal fibula fracture on 1/9/2024.  Pain levels \"virtually none\".  She is following postop instructions    LMP 05/19/2008       ROS - positive for CC.  Patient denies current nausea, vomiting, chills, fevers, belly pain, calf pain, chest pain or SOB.  Complete remainder of ROS is otherwise neg.    PE -staples intact, skin margins well-healing.  No necrosis, gapping or local signs of infection are seen.  There is mild swelling in the foot, within normal limits for the stage post injury and postsurgery.  Skin shows no trophic, color or temperature changes otherwise.  No calf redness, swelling or pain noted otherwise.    Imaging -postop x-rays - IMPRESSION: There are postoperative changes from instrumented plate  and screw fixation of the distal fibular fracture, in improved,  anatomic alignment. Hardware appears intact and well seated. There is  soft tissue swelling over the ankle.    A/P - 68 year old yo patient approx 10 days sp above procedure  -We reviewed the procedure and intraoperative findings  -We reviewed intraoperative and postoperative x-rays.  Anatomic alignment has been restored  -New dressing was applied  -Continue all postoperative instructions without change; reviewed  -No pain med refill request    Follow up  -1/26/2024 or sooner with acute issues        Doron Michelle DPM FACFAS FACFAOM  Podiatric Foot & Ankle Surgeon  [facility]  [phone]    Disclaimer: This note consists of symbols derived from keyboarding, dictation and/or voice recognition software. As a result, there may be errors in the script that " have gone undetected. Please consider this when interpreting information found in this chart.      Again, thank you for allowing me to participate in the care of your patient.        Sincerely,        Doron Michelle DPM, MANI

## 2024-01-19 NOTE — PROGRESS NOTES
"Foot & Ankle Surgery  January 19, 2024    S:  Patient in today sp open reduction internal fixation left ankle/distal fibula fracture on 1/9/2024.  Pain levels \"virtually none\".  She is following postop instructions    LMP 05/19/2008       ROS - positive for CC.  Patient denies current nausea, vomiting, chills, fevers, belly pain, calf pain, chest pain or SOB.  Complete remainder of ROS is otherwise neg.    PE -staples intact, skin margins well-healing.  No necrosis, gapping or local signs of infection are seen.  There is mild swelling in the foot, within normal limits for the stage post injury and postsurgery.  Skin shows no trophic, color or temperature changes otherwise.  No calf redness, swelling or pain noted otherwise.    Imaging -postop x-rays - IMPRESSION: There are postoperative changes from instrumented plate  and screw fixation of the distal fibular fracture, in improved,  anatomic alignment. Hardware appears intact and well seated. There is  soft tissue swelling over the ankle.    A/P - 68 year old yo patient approx 10 days sp above procedure  -We reviewed the procedure and intraoperative findings  -We reviewed intraoperative and postoperative x-rays.  Anatomic alignment has been restored  -New dressing was applied  -Continue all postoperative instructions without change; reviewed  -No pain med refill request    Follow up  -1/26/2024 or sooner with acute issues        Doron Michelle DPM FACFAS FACFAOM  Podiatric Foot & Ankle Surgeon  [facility]  [phone]    Disclaimer: This note consists of symbols derived from keyboarding, dictation and/or voice recognition software. As a result, there may be errors in the script that have gone undetected. Please consider this when interpreting information found in this chart.    "

## 2024-01-26 ENCOUNTER — OFFICE VISIT (OUTPATIENT)
Dept: PODIATRY | Facility: CLINIC | Age: 69
End: 2024-01-26
Payer: MEDICARE

## 2024-01-26 DIAGNOSIS — S82.832A CLOSED FRACTURE OF DISTAL END OF LEFT FIBULA, UNSPECIFIED FRACTURE MORPHOLOGY, INITIAL ENCOUNTER: Primary | ICD-10-CM

## 2024-01-26 DIAGNOSIS — Z87.81 STATUS POST OPEN REDUCTION WITH INTERNAL FIXATION (ORIF) OF FRACTURE OF ANKLE: ICD-10-CM

## 2024-01-26 DIAGNOSIS — Z98.890 STATUS POST OPEN REDUCTION WITH INTERNAL FIXATION (ORIF) OF FRACTURE OF ANKLE: ICD-10-CM

## 2024-01-26 PROCEDURE — 99024 POSTOP FOLLOW-UP VISIT: CPT | Performed by: PODIATRIST

## 2024-01-26 RX ORDER — CEPHALEXIN 500 MG/1
500 CAPSULE ORAL 2 TIMES DAILY
Qty: 14 CAPSULE | Refills: 0 | Status: SHIPPED | OUTPATIENT
Start: 2024-01-26 | End: 2024-02-02

## 2024-01-26 NOTE — PROGRESS NOTES
Foot & Ankle Surgery  January 26, 2024    S:  Patient in today sp open reduction internal fixation left ankle/distal fibular fracture on 1/9/2024.  Pain levels minimal.  Following postop instructions    LMP 05/19/2008       ROS - positive for CC.  Patient denies current nausea, vomiting, chills, fevers, belly pain, calf pain, chest pain or SOB.  Complete remainder of ROS is otherwise neg.    PE -there is a subacute superficial blood blister along the proximal aspect of the incision with very mild surrounding redness.  The incision is slow to heal in this area.  The remaining portion of the incision is healing as expected..  Skin shows no trophic, color or temperature changes otherwise.  No calf redness, swelling or pain noted otherwise.    A/P - 68 year old yo patient approx 2 weeks sp above procedure  -I attempted to remove one of the proximal staples but mild gapping was seen so remaining staples were left intact.  Bacitracin and a new bandage were applied  -For the very mild surrounding redness, the patient was given a prescription for Keflex 500 mg twice daily x 7 days  -She is okay to start touchdown weightbearing in the boot with crutches/walker  -Continue elevation, resting and DVT exercises  -1 week follow-up for reassessment and likely staple removal    Follow up  -1 week or sooner with acute issues      Doron Michelle DPM FACFAS FACFAOM  Podiatric Foot & Ankle Surgeon  [facility]  [phone]    Disclaimer: This note consists of symbols derived from keyboarding, dictation and/or voice recognition software. As a result, there may be errors in the script that have gone undetected. Please consider this when interpreting information found in this chart.

## 2024-01-26 NOTE — LETTER
1/26/2024         RE: Vanessa Mckeon  [address]  [place] [address]        Dear Colleague,    Thank you for referring your patient, Vanessa Mckeon, to the [facility]. Please see a copy of my visit note below.    Foot & Ankle Surgery  January 26, 2024    S:  Patient in today sp open reduction internal fixation left ankle/distal fibular fracture on 1/9/2024.  Pain levels minimal.  Following postop instructions    LMP 05/19/2008       ROS - positive for CC.  Patient denies current nausea, vomiting, chills, fevers, belly pain, calf pain, chest pain or SOB.  Complete remainder of ROS is otherwise neg.    PE -there is a subacute superficial blood blister along the proximal aspect of the incision with very mild surrounding redness.  The incision is slow to heal in this area.  The remaining portion of the incision is healing as expected..  Skin shows no trophic, color or temperature changes otherwise.  No calf redness, swelling or pain noted otherwise.    A/P - 68 year old yo patient approx 2 weeks sp above procedure  -I attempted to remove one of the proximal staples but mild gapping was seen so remaining staples were left intact.  Bacitracin and a new bandage were applied  -For the very mild surrounding redness, the patient was given a prescription for Keflex 500 mg twice daily x 7 days  -She is okay to start touchdown weightbearing in the boot with crutches/walker  -Continue elevation, resting and DVT exercises  -1 week follow-up for reassessment and likely staple removal    Follow up  -1 week or sooner with acute issues      Doron Michelle DPM FACFAS FACFAOM  Podiatric Foot & Ankle Surgeon  [facility]  [phone]    Disclaimer: This note consists of symbols derived from keyboarding, dictation and/or voice recognition software. As a result, there may be errors in the script that have gone undetected. Please consider this when interpreting  information found in this chart.      Again, thank you for allowing me to participate in the care of your patient.        Sincerely,        Doron Michelle DPM, MANI

## 2024-01-28 ENCOUNTER — HEALTH MAINTENANCE LETTER (OUTPATIENT)
Age: 69
End: 2024-01-28

## 2024-02-01 ENCOUNTER — OFFICE VISIT (OUTPATIENT)
Dept: PODIATRY | Facility: CLINIC | Age: 69
End: 2024-02-01
Payer: MEDICARE

## 2024-02-01 VITALS — DIASTOLIC BLOOD PRESSURE: 70 MMHG | SYSTOLIC BLOOD PRESSURE: 120 MMHG

## 2024-02-01 DIAGNOSIS — S82.832A CLOSED FRACTURE OF DISTAL END OF LEFT FIBULA, UNSPECIFIED FRACTURE MORPHOLOGY, INITIAL ENCOUNTER: Primary | ICD-10-CM

## 2024-02-01 DIAGNOSIS — Z98.890 STATUS POST OPEN REDUCTION WITH INTERNAL FIXATION (ORIF) OF FRACTURE OF ANKLE: ICD-10-CM

## 2024-02-01 DIAGNOSIS — Z87.81 STATUS POST OPEN REDUCTION WITH INTERNAL FIXATION (ORIF) OF FRACTURE OF ANKLE: ICD-10-CM

## 2024-02-01 PROCEDURE — 99024 POSTOP FOLLOW-UP VISIT: CPT | Performed by: PODIATRIST

## 2024-02-01 NOTE — LETTER
"    2/1/2024         RE: Vanessa Mckeon  [address]  [place] [address]        Dear Colleague,    Thank you for referring your patient, Vanessa Mckeon, to the [facility]. Please see a copy of my visit note below.    Foot & Ankle Surgery  February 1, 2024    S:  Patient in today sp open reduction internal fixation left distal fibular fracture on 1/9/2024.  Pain levels \"none\".  The proximal part of the incision was not healing well and appeared mildly erythematous.  She was given a prescription for Keflex.  She is touchdown weightbearing in the boot with her walker    /70   LMP 05/19/2008       ROS - positive for CC.  Patient denies current nausea, vomiting, chills, fevers, belly pain, calf pain, chest pain or SOB.  Complete remainder of ROS is otherwise neg.    PE -the proximal staples appear to be irritating the skin.  All staples were removed.  There is a superficial wound at the proximal aspect of the incision without signs of infection..  Skin shows no trophic, color or temperature changes otherwise.  No calf redness, swelling or pain noted otherwise.    Imaging - IMPRESSION: There are postoperative changes from instrumented plate  and screw fixation of the distal fibular fracture, in improved,  anatomic alignment. Hardware appears intact and well seated. There is  soft tissue swelling over the ankle.      A/P - 68 year old yo patient approx 3 weeks sp above procedure  -All staples were removed today  -Finish current antibiotics, no indication for a new or extended course  -Regarding the proximal wound, she was given wound care instructions: Wash thoroughly, dry thoroughly, antibiotic ointment and a bandage daily until healed  -Continue touchdown weightbearing in the boot with a walker  -RICE/Tylenol as needed based on pain  -She will follow-up in 3 weeks for reassessment and repeat x-rays.  If exam and x-rays demonstrate adequate healing, " should be cleared to start bearing weight on the foot and will be referred to physical therapy to start [facility] functional rehab    Follow up  -3 weeks or sooner with acute issues    Plan for [id number] -retired      Doron Michelle DPM FACFAS FACFAOM  Podiatric Foot & Ankle Surgeon  [facility]  [phone]    Disclaimer: This note consists of symbols derived from keyboarding, dictation and/or voice recognition software. As a result, there may be errors in the script that have gone undetected. Please consider this when interpreting information found in this chart.      Again, thank you for allowing me to participate in the care of your patient.        Sincerely,        Doron Michelle DPM, MANI

## 2024-02-01 NOTE — PROGRESS NOTES
"Foot & Ankle Surgery  February 1, 2024    S:  Patient in today sp open reduction internal fixation left distal fibular fracture on 1/9/2024.  Pain levels \"none\".  The proximal part of the incision was not healing well and appeared mildly erythematous.  She was given a prescription for Keflex.  She is touchdown weightbearing in the boot with her walker    /70   LMP 05/19/2008       ROS - positive for CC.  Patient denies current nausea, vomiting, chills, fevers, belly pain, calf pain, chest pain or SOB.  Complete remainder of ROS is otherwise neg.    PE -the proximal staples appear to be irritating the skin.  All staples were removed.  There is a superficial wound at the proximal aspect of the incision without signs of infection..  Skin shows no trophic, color or temperature changes otherwise.  No calf redness, swelling or pain noted otherwise.    Imaging - IMPRESSION: There are postoperative changes from instrumented plate  and screw fixation of the distal fibular fracture, in improved,  anatomic alignment. Hardware appears intact and well seated. There is  soft tissue swelling over the ankle.      A/P - 68 year old yo patient approx 3 weeks sp above procedure  -All staples were removed today  -Finish current antibiotics, no indication for a new or extended course  -Regarding the proximal wound, she was given wound care instructions: Wash thoroughly, dry thoroughly, antibiotic ointment and a bandage daily until healed  -Continue touchdown weightbearing in the boot with a walker  -RICE/Tylenol as needed based on pain  -She will follow-up in 3 weeks for reassessment and repeat x-rays.  If exam and x-rays demonstrate adequate healing, should be cleared to start bearing weight on the foot and will be referred to physical therapy to start [facility] functional rehab    Follow up  -3 weeks or sooner with acute issues    Plan for [id number] -retired      Doron Michelle DPM FACFAS FACFAOM  Podiatric Foot & Ankle " Surgeon  [facility]  [phone]    Disclaimer: This note consists of symbols derived from keyboarding, dictation and/or voice recognition software. As a result, there may be errors in the script that have gone undetected. Please consider this when interpreting information found in this chart.

## 2024-02-01 NOTE — PATIENT INSTRUCTIONS
Thank you for choosing [facility] / Foot & Ankle Surgery!    DR. DUNCAN'S CLINIC LOCATIONS:     [facility] (Friday) TRIAGE LINE: [phone] [address] [facility]  APPOINTMENTS: [phone]   [place] [address] RADIOLOGY: [phone]    PHYSICAL THERAPY: [phone]    SET UP SURGERY: [phone]   [place] (Mon-Tues AM-Thurs) BILLING QUESTIONS: [phone] [address] FAX: [phone]   MICHELLE Block [address] [facility]: [phone]     You are seen today 3 weeks out from left ankle fracture repair.  All staples were removed.  Recommendations:    1.  There is a small open area along the top of your incision.  Wash thoroughly, dry thoroughly, antibiotic ointment and a Band-Aid daily.  Finish current antibiotics, I see no indication for a new or extended course;    2.  Continue touchdown weightbearing in the boot with a walker;    3.  Rest, ice, elevate and utilize the dispensed compression sleeve for swelling control.  You are otherwise cleared to increase activity levels to tolerance.    Follow-up in 3 weeks for your next reassessment.  Arrive 20 minutes early as we will be getting updated x-rays of your ankle.  Anticipate starting increased weight on the foot and physical therapy if all appears to be healing well

## 2024-02-08 ENCOUNTER — TELEPHONE (OUTPATIENT)
Dept: PODIATRY | Facility: CLINIC | Age: 69
End: 2024-02-08

## 2024-02-08 ENCOUNTER — OFFICE VISIT (OUTPATIENT)
Dept: PODIATRY | Facility: CLINIC | Age: 69
End: 2024-02-08
Payer: MEDICARE

## 2024-02-08 VITALS — DIASTOLIC BLOOD PRESSURE: 72 MMHG | SYSTOLIC BLOOD PRESSURE: 118 MMHG

## 2024-02-08 DIAGNOSIS — Z87.81 STATUS POST OPEN REDUCTION WITH INTERNAL FIXATION (ORIF) OF FRACTURE OF ANKLE: ICD-10-CM

## 2024-02-08 DIAGNOSIS — T81.89XD NON-HEALING SURGICAL WOUND, SUBSEQUENT ENCOUNTER: ICD-10-CM

## 2024-02-08 DIAGNOSIS — S82.832A CLOSED FRACTURE OF DISTAL END OF LEFT FIBULA, UNSPECIFIED FRACTURE MORPHOLOGY, INITIAL ENCOUNTER: Primary | ICD-10-CM

## 2024-02-08 DIAGNOSIS — Z98.890 STATUS POST OPEN REDUCTION WITH INTERNAL FIXATION (ORIF) OF FRACTURE OF ANKLE: ICD-10-CM

## 2024-02-08 PROCEDURE — 99024 POSTOP FOLLOW-UP VISIT: CPT | Performed by: PODIATRIST

## 2024-02-08 PROCEDURE — 11042 DBRDMT SUBQ TIS 1ST 20SQCM/<: CPT | Mod: 58 | Performed by: PODIATRIST

## 2024-02-08 RX ORDER — CEPHALEXIN 500 MG/1
500 CAPSULE ORAL 2 TIMES DAILY
Qty: 14 CAPSULE | Refills: 0 | Status: SHIPPED | OUTPATIENT
Start: 2024-02-08 | End: 2024-02-15

## 2024-02-08 NOTE — LETTER
2/8/2024         RE: Vanessa Mckeon  [address]  [place] [address]        Dear Colleague,    Thank you for referring your patient, Vanessa Mckeon, to the [facility]. Please see a copy of my visit note below.    Foot & Ankle Surgery  February 8, 2024    S:  Patient in today sp open reduction internal fixation left distal fibular fracture on 1/9/2024.  Pain levels minimal.  She sent a [employer] message after noticing that some of the redness around the portion of the incision that was slow to heal was now a bit more prominent    /72   LMP 05/19/2008       ROS - positive for CC.  Patient denies current nausea, vomiting, chills, fevers, belly pain, calf pain, chest pain or SOB.  Complete remainder of ROS is otherwise neg.    PE -the proximal open area measures less than 20 cm  and is down to exposed fat.  There is mild surrounding erythema here but no purulence.  Is not focally warm.  There is no lymphangitis.  Skin shows no trophic, color or temperature changes otherwise.  No calf redness, swelling or pain noted otherwise.    A/P - 68 year old yo patient approx 4 weeks sp above procedure  -Excisional debridement was performed, full-thickness, sharply debriding the wound down to and including exposed sub-cutaneous tissue/fat, excising nonviable tissue to the above dimensions with a 15 blade  -Continue wound care: Wash thoroughly, dry thoroughly, antibiotic ointment and a bandage daily  -Continue all postoperative instructions, otherwise unchanged  -Based on low-grade surrounding redness, the patient was given a prescription for Keflex 500 mg twice daily x 7 days.  -She has an appointment in 2 weeks, her 6-week postop appointment.  If next week, the surgical site is not making much progress or is worsening, I encouraged her to follow-up.  If the area otherwise is stable, she will follow-up in 2 weeks for updated x-rays of the ankle    Follow up  -as  above or sooner with acute issues    Plan for [id number] -once healed sufficiently      Doron Michelle DPM FACFAS FACFAOM  Podiatric Foot & Ankle Surgeon  [facility]  [phone]    Disclaimer: This note consists of symbols derived from keyboarding, dictation and/or voice recognition software. As a result, there may be errors in the script that have gone undetected. Please consider this when interpreting information found in this chart.      Again, thank you for allowing me to participate in the care of your patient.        Sincerely,        Doron Michelle DPM, DPM

## 2024-02-08 NOTE — TELEPHONE ENCOUNTER
Huddled with provider. He would prefer to see patient in person if possible.       Writer called patient and coordinate appointment for this afternoon. No further action needed.     Madie Aj, ATC

## 2024-02-08 NOTE — PROGRESS NOTES
Foot & Ankle Surgery  February 8, 2024    S:  Patient in today sp open reduction internal fixation left distal fibular fracture on 1/9/2024.  Pain levels minimal.  She sent a [employer] message after noticing that some of the redness around the portion of the incision that was slow to heal was now a bit more prominent    /72   LMP 05/19/2008       ROS - positive for CC.  Patient denies current nausea, vomiting, chills, fevers, belly pain, calf pain, chest pain or SOB.  Complete remainder of ROS is otherwise neg.    PE -the proximal open area measures less than 20 cm  and is down to exposed fat.  There is mild surrounding erythema here but no purulence.  Is not focally warm.  There is no lymphangitis.  Skin shows no trophic, color or temperature changes otherwise.  No calf redness, swelling or pain noted otherwise.    A/P - 68 year old yo patient approx 4 weeks sp above procedure  -Excisional debridement was performed, full-thickness, sharply debriding the wound down to and including exposed sub-cutaneous tissue/fat, excising nonviable tissue to the above dimensions with a 15 blade  -Continue wound care: Wash thoroughly, dry thoroughly, antibiotic ointment and a bandage daily  -Continue all postoperative instructions, otherwise unchanged  -Based on low-grade surrounding redness, the patient was given a prescription for Keflex 500 mg twice daily x 7 days.  -She has an appointment in 2 weeks, her 6-week postop appointment.  If next week, the surgical site is not making much progress or is worsening, I encouraged her to follow-up.  If the area otherwise is stable, she will follow-up in 2 weeks for updated x-rays of the ankle    Follow up  -as above or sooner with acute issues    Plan for [id number] -once healed sufficiently      Doron Michelle, MANI FACFAS FACFAOM  Podiatric Foot & Ankle Surgeon  [facility]  [phone]    Disclaimer: This note consists of symbols derived from keyboarding,  dictation and/or voice recognition software. As a result, there may be errors in the script that have gone undetected. Please consider this when interpreting information found in this chart.

## 2024-02-08 NOTE — TELEPHONE ENCOUNTER
"Patient is status open reduction internal fixation left distal fibular fracture on 1/9/2024. She was last seen 2/1/24.     She states her stitches were removed on 2/1/24. She has been washing and using ointment daily on incision. She notes today she noticed redness and inflammation when she woke up. She feels there is a small area at the top of the incision that is raw and \"oozing\" a bit.  She denies fever or feeling ill. The redness is not spreading up or down the leg, just remains around the incision. The drainage and red and white.     Patient will attempt to send a picture of the area via [employer]. If she is unable to do so she will call office back to discuss further.     Madie Aj MSA, ATC  Certified Athletic Trainer   "

## 2024-02-08 NOTE — TELEPHONE ENCOUNTER
[facility] Call Center    Phone Message    May a detailed message be left on voicemail: yes     Reason for Call: Other: Patient is calling because ankle incision area is now red and inflamed as of the last 24 hours and not healing properly as of today. Would like to speak to the care team       Action Taken: Other: Miguel Angel    Travel Screening: Not Applicable

## 2024-02-26 ENCOUNTER — ANCILLARY PROCEDURE (OUTPATIENT)
Dept: GENERAL RADIOLOGY | Facility: CLINIC | Age: 69
End: 2024-02-26
Attending: PODIATRIST
Payer: MEDICARE

## 2024-02-26 ENCOUNTER — OFFICE VISIT (OUTPATIENT)
Dept: PODIATRY | Facility: CLINIC | Age: 69
End: 2024-02-26
Payer: MEDICARE

## 2024-02-26 VITALS — BODY MASS INDEX: 35.19 KG/M2 | DIASTOLIC BLOOD PRESSURE: 84 MMHG | SYSTOLIC BLOOD PRESSURE: 126 MMHG | WEIGHT: 205 LBS

## 2024-02-26 DIAGNOSIS — Z98.890 STATUS POST OPEN REDUCTION WITH INTERNAL FIXATION (ORIF) OF FRACTURE OF ANKLE: ICD-10-CM

## 2024-02-26 DIAGNOSIS — S82.832A CLOSED FRACTURE OF DISTAL END OF LEFT FIBULA, UNSPECIFIED FRACTURE MORPHOLOGY, INITIAL ENCOUNTER: Primary | ICD-10-CM

## 2024-02-26 DIAGNOSIS — S82.832A CLOSED FRACTURE OF DISTAL END OF LEFT FIBULA, UNSPECIFIED FRACTURE MORPHOLOGY, INITIAL ENCOUNTER: ICD-10-CM

## 2024-02-26 DIAGNOSIS — Z87.81 STATUS POST OPEN REDUCTION WITH INTERNAL FIXATION (ORIF) OF FRACTURE OF ANKLE: ICD-10-CM

## 2024-02-26 PROCEDURE — 73610 X-RAY EXAM OF ANKLE: CPT | Mod: TC | Performed by: RADIOLOGY

## 2024-02-26 PROCEDURE — 99024 POSTOP FOLLOW-UP VISIT: CPT | Performed by: PODIATRIST

## 2024-02-26 NOTE — PROGRESS NOTES
Foot & Ankle Surgery  February 26, 2024    S:  Patient in today sp ORIF left distal fibular fracture on 1/9/2024.  Pain levels minimal.  She is weightbearing in the boot using her walker.  She states she continues to dress the lateral wound daily    LMP 05/19/2008       ROS - positive for CC.  Patient denies current nausea, vomiting, chills, fevers, belly pain, calf pain, chest pain or SOB.  Complete remainder of ROS is otherwise neg.    PE -the lateral wound appears much smaller and stable without cellulitis or drainage.  Stressing of the fibular fracture repair site shows no pain or instability.  Skin shows no trophic, color or temperature changes otherwise.  No calf redness, swelling or pain noted otherwise.    Imaging -3 views weightbearing left ankle-intact hardware without evidence of loosening.  The fracture line is no longer readily visible on these images.  The mortise is anatomic    A/P - 68 year old yo patient approx 7 weeks sp above procedure  -I personally interpreted and reviewed today's x-rays.  The fracture line is no longer visible.  The hardware appears intact.  The ankle mortise is anatomic  -The patient is cleared to increase weight on the foot in the boot and slowly phase off the walker to tolerance with care to avoid exceeding the pain threshold at the fracture repair site with time/weight in the foot as this can have a negative impact on progressive healing  -RICE/Tylenol as needed based on pain  -Continue with lateral ankle wound care daily.  No indication for debridement or oral antibiotics today    Follow up  -3 weeks or sooner with acute issues    Plan for [id number] -retired      Doron Michelle DPM FACFAS FACFAOM  Podiatric Foot & Ankle Surgeon  [facility]  [phone]    Disclaimer: This note consists of symbols derived from keyboarding, dictation and/or voice recognition software. As a result, there may be errors in the script that have gone undetected. Please  consider this when interpreting information found in this chart.

## 2024-02-26 NOTE — LETTER
2/26/2024         RE: Vanessa Mckeon  [address]  [place] [address]        Dear Colleague,    Thank you for referring your patient, Vanessa Mckeon, to the [facility]. Please see a copy of my visit note below.    Foot & Ankle Surgery  February 26, 2024    S:  Patient in today sp ORIF left distal fibular fracture on 1/9/2024.  Pain levels minimal.  She is weightbearing in the boot using her walker.  She states she continues to dress the lateral wound daily    LMP 05/19/2008       ROS - positive for CC.  Patient denies current nausea, vomiting, chills, fevers, belly pain, calf pain, chest pain or SOB.  Complete remainder of ROS is otherwise neg.    PE -the lateral wound appears much smaller and stable without cellulitis or drainage.  Stressing of the fibular fracture repair site shows no pain or instability.  Skin shows no trophic, color or temperature changes otherwise.  No calf redness, swelling or pain noted otherwise.    Imaging -3 views weightbearing left ankle-intact hardware without evidence of loosening.  The fracture line is no longer readily visible on these images.  The mortise is anatomic    A/P - 68 year old yo patient approx 7 weeks sp above procedure  -I personally interpreted and reviewed today's x-rays.  The fracture line is no longer visible.  The hardware appears intact.  The ankle mortise is anatomic  -The patient is cleared to increase weight on the foot in the boot and slowly phase off the walker to tolerance with care to avoid exceeding the pain threshold at the fracture repair site with time/weight in the foot as this can have a negative impact on progressive healing  -RICE/Tylenol as needed based on pain  -Continue with lateral ankle wound care daily.  No indication for debridement or oral antibiotics today    Follow up  -3 weeks or sooner with acute issues    Plan for [id number] -retired      Doron Michelle DPM  FACFAS FACFAOM  Podiatric Foot & Ankle Surgeon  [facility]  [phone]    Disclaimer: This note consists of symbols derived from keyboarding, dictation and/or voice recognition software. As a result, there may be errors in the script that have gone undetected. Please consider this when interpreting information found in this chart.      Again, thank you for allowing me to participate in the care of your patient.        Sincerely,        Doron Michelle DPM, MANI

## 2024-03-06 DIAGNOSIS — E03.9 HYPOTHYROIDISM, UNSPECIFIED TYPE: ICD-10-CM

## 2024-03-06 DIAGNOSIS — E78.5 HYPERLIPIDEMIA LDL GOAL <160: ICD-10-CM

## 2024-03-06 RX ORDER — LEVOTHYROXINE SODIUM 100 UG/1
100 TABLET ORAL DAILY
Qty: 90 TABLET | Refills: 0 | Status: SHIPPED | OUTPATIENT
Start: 2024-03-06 | End: 2024-05-07

## 2024-03-06 RX ORDER — ATORVASTATIN CALCIUM 10 MG/1
10 TABLET, FILM COATED ORAL DAILY
Qty: 90 TABLET | Refills: 0 | Status: SHIPPED | OUTPATIENT
Start: 2024-03-06 | End: 2024-05-07

## 2024-03-18 ENCOUNTER — OFFICE VISIT (OUTPATIENT)
Dept: PODIATRY | Facility: CLINIC | Age: 69
End: 2024-03-18
Payer: MEDICARE

## 2024-03-18 ENCOUNTER — ANCILLARY PROCEDURE (OUTPATIENT)
Dept: GENERAL RADIOLOGY | Facility: CLINIC | Age: 69
End: 2024-03-18
Attending: PODIATRIST
Payer: MEDICARE

## 2024-03-18 VITALS — WEIGHT: 205 LBS | BODY MASS INDEX: 35.19 KG/M2 | DIASTOLIC BLOOD PRESSURE: 85 MMHG | SYSTOLIC BLOOD PRESSURE: 121 MMHG

## 2024-03-18 DIAGNOSIS — Z87.81 STATUS POST OPEN REDUCTION WITH INTERNAL FIXATION (ORIF) OF FRACTURE OF ANKLE: ICD-10-CM

## 2024-03-18 DIAGNOSIS — Z98.890 STATUS POST OPEN REDUCTION WITH INTERNAL FIXATION (ORIF) OF FRACTURE OF ANKLE: ICD-10-CM

## 2024-03-18 DIAGNOSIS — S82.832A CLOSED FRACTURE OF DISTAL END OF LEFT FIBULA, UNSPECIFIED FRACTURE MORPHOLOGY, INITIAL ENCOUNTER: Primary | ICD-10-CM

## 2024-03-18 DIAGNOSIS — S82.832A CLOSED FRACTURE OF DISTAL END OF LEFT FIBULA, UNSPECIFIED FRACTURE MORPHOLOGY, INITIAL ENCOUNTER: ICD-10-CM

## 2024-03-18 PROCEDURE — 99024 POSTOP FOLLOW-UP VISIT: CPT | Performed by: PODIATRIST

## 2024-03-18 PROCEDURE — 73610 X-RAY EXAM OF ANKLE: CPT | Mod: TC | Performed by: RADIOLOGY

## 2024-03-18 NOTE — PROGRESS NOTES
Foot & Ankle Surgery  March 18, 2024    S:  Patient in today sp open reduction internal fixation left distal fibular fracture on 1/9/2024.  Pain levels minimal.  She is full weightbearing in the boot without weightbearing assistive device.  She has been ambulating at home outside the boot.  Pain levels are minimal although she does state the lateral foot/ankle feels stiff/tight    LMP 05/19/2008       ROS - positive for CC.  Patient denies current nausea, vomiting, chills, fevers, belly pain, calf pain, chest pain or SOB.  Complete remainder of ROS is otherwise neg.    PE -the open wound is smaller without signs of infection along the lateral ankle.  No pain on palpation or pain with stressing of the ankle joint today.  Skin shows no trophic, color or temperature changes otherwise.  No calf redness, swelling or pain noted otherwise.    Imaging -3 views weightbearing left ankle -the ankle mortise is anatomic.  The fracture line is no longer readily visible.  The hardware is intact      A/P - 68 year old yo patient approx 10 weeks sp above procedure  -I personally interpreted and reviewed today's x-rays.  The fracture line is no longer readily visible and there appears to be continuity of bone  -Clinically, she is doing quite well.  She is cleared to transition from the boot to a comfortable shoe as tolerated  -RICE/Tylenol as needed; continue Tensogrip as long as swelling persists  -RAFAELA PT referral for musculoskeletal functional rehab to address the tightness/stiffness she feels    Follow up  -3 months or sooner with acute issues          Doron Michelle DPM FACFAS FACFAOM  Podiatric Foot & Ankle Surgeon  [facility]  [phone]    Disclaimer: This note consists of symbols derived from keyboarding, dictation and/or voice recognition software. As a result, there may be errors in the script that have gone undetected. Please consider this when interpreting information found in this chart.

## 2024-03-18 NOTE — LETTER
3/18/2024         RE: Vanessa Mckeon  [address]  [place] [address]        Dear Colleague,    Thank you for referring your patient, Vanessa Mckeon, to the [facility]OD[facility]. Please see a copy of my visit note below.    Foot & Ankle Surgery  March 18, 2024    S:  Patient in today sp open reduction internal fixation left distal fibular fracture on 1/9/2024.  Pain levels minimal.  She is full weightbearing in the boot without weightbearing assistive device.  She has been ambulating at home outside the boot.  Pain levels are minimal although she does state the lateral foot/ankle feels stiff/tight    LMP 05/19/2008       ROS - positive for CC.  Patient denies current nausea, vomiting, chills, fevers, belly pain, calf pain, chest pain or SOB.  Complete remainder of ROS is otherwise neg.    PE -the open wound is smaller without signs of infection along the lateral ankle.  No pain on palpation or pain with stressing of the ankle joint today.  Skin shows no trophic, color or temperature changes otherwise.  No calf redness, swelling or pain noted otherwise.    Imaging -3 views weightbearing left ankle -the ankle mortise is anatomic.  The fracture line is no longer readily visible.  The hardware is intact      A/P - 68 year old yo patient approx 10 weeks sp above procedure  -I personally interpreted and reviewed today's x-rays.  The fracture line is no longer readily visible and there appears to be continuity of bone  -Clinically, she is doing quite well.  She is cleared to transition from the boot to a comfortable shoe as tolerated  -RICE/Tylenol as needed; continue Tensogrip as long as swelling persists  -RAFAELA PT referral for musculoskeletal functional rehab to address the tightness/stiffness she feels    Follow up  -3 months or sooner with acute issues          Doron Michelle DPM FACFAS FACFAOM  Podiatric Foot & Ankle Surgeon  [facility]  [phone]    Disclaimer: This note consists of symbols derived from keyboarding, dictation and/or voice recognition software. As a result, there may be errors in the script that have gone undetected. Please consider this when interpreting information found in this chart.      Again, thank you for allowing me to participate in the care of your patient.        Sincerely,        Doron Michelle DPM, MANI

## 2024-03-19 ASSESSMENT — ACTIVITIES OF DAILY LIVING (ADL)
YOUR_USUAL_HOBBIES,_RECREATIONAL_OR_SPORTING_ACTIVITIES: A LITTLE BIT OF DIFFICULTY
WALKING_BETWEEN_ROOMS: NO DIFFICULTY
PERFORMING_LIGHT_ACTIVITIES_AROUND_YOUR_HOME: NO DIFFICULTY
SITTING_FOR_1_HOUR: NO DIFFICULTY
STANDING_FOR_1_HOUR: MODERATE DIFFICULTY
GETTING_INTO_OR_OUT_OF_A_CAR: NO DIFFICULTY
GOING_UP_OR_DOWN_10_STAIRS: MODERATE DIFFICULTY
PERFORMING_HEAVY_ACTIVITIES_AROUND_YOUR_HOME: A LITTLE BIT OF DIFFICULTY
LEFS_SCORE(%): 0
GETTING_INTO_AND_OUT_OF_A_BATH: A LITTLE BIT OF DIFFICULTY
SQUATTING: MODERATE DIFFICULTY
WALKING_A_MILE: A LITTLE BIT OF DIFFICULTY
RUNNING_ON_EVEN_GROUND: MODERATE DIFFICULTY
ANY_OF_YOUR_USUAL_WORK,_HOUSEWORK_OR_SCHOOL_ACTIVITIES: A LITTLE BIT OF DIFFICULTY
RUNNING_ON_UNEVEN_GROUND: MODERATE DIFFICULTY
PLEASE_INDICATE_YOR_PRIMARY_REASON_FOR_REFERRAL_TO_THERAPY:: FOOT AND/OR ANKLE
MAKING_SHARP_TURNS_WHILE_RUNNING_FAST: MODERATE DIFFICULTY
LIFTING_AN_OBJECT,_LIKE_A_BAG_OF_GROCERIES_FROM_THE_FLOOR: NO DIFFICULTY
LEFS_RAW_SCORE: 0
SHOPPING: MODERATE DIFFICULTY
WALKING_2_BLOCKS: A LITTLE BIT OF DIFFICULTY
ROLLING_OVER_IN_BED: NO DIFFICULTY
PUTTING_ON_YOUR_SHOES_OR_SOCKS: NO DIFFICULTY

## 2024-03-20 ENCOUNTER — THERAPY VISIT (OUTPATIENT)
Dept: PHYSICAL THERAPY | Facility: CLINIC | Age: 69
End: 2024-03-20
Attending: PODIATRIST
Payer: MEDICARE

## 2024-03-20 DIAGNOSIS — Z98.890 STATUS POST OPEN REDUCTION WITH INTERNAL FIXATION (ORIF) OF FRACTURE OF ANKLE: ICD-10-CM

## 2024-03-20 DIAGNOSIS — Z87.81 STATUS POST OPEN REDUCTION WITH INTERNAL FIXATION (ORIF) OF FRACTURE OF ANKLE: ICD-10-CM

## 2024-03-20 DIAGNOSIS — S82.832A CLOSED FRACTURE OF DISTAL END OF LEFT FIBULA, UNSPECIFIED FRACTURE MORPHOLOGY, INITIAL ENCOUNTER: ICD-10-CM

## 2024-03-20 DIAGNOSIS — R26.89 BALANCE PROBLEMS: Primary | ICD-10-CM

## 2024-03-20 PROCEDURE — 97161 PT EVAL LOW COMPLEX 20 MIN: CPT | Mod: GP | Performed by: PHYSICAL THERAPIST

## 2024-03-20 PROCEDURE — 97110 THERAPEUTIC EXERCISES: CPT | Mod: GP | Performed by: PHYSICAL THERAPIST

## 2024-03-20 NOTE — PROGRESS NOTES
PHYSICAL THERAPY EVALUATION  Type of Visit: Evaluation    See electronic medical record for Abuse and Falls Screening details.    Subjective       Presenting condition or subjective complaint: healing broken ankle is stiff, little range of motion    Patient was descending stairs at a  on 2023, missing the last step, falling and breaking her L distal fibula. Was able to lightly weightbear, there was pain and swelling immediately. After the New Year's weekend was seen in  and diagnosed with a distal fibula fracture. Underwent distal fibula ORIF on 2024 performed by Dr. Michelle, discharged home same day. States pain is currently located in the anterior and lateral L ankle. Currently ambulating in a CAM walker, working on wean out of the boot but this does cause soreness, about 20 minutes without prior to sitting. No stairs at home, hasn't encountered any while wearing the boot. Rest, ice, elevation and occasionally Aleve for pain.     Date of onset: 23    Relevant medical history:     Dates & types of surgery: broken ankle 24    Prior diagnostic imaging/testing results: X-ray     Prior therapy history for the same diagnosis, illness or injury: No      Prior Level of Function  Transfers: Independent  Ambulation: Independent  ADL: Independent  IADL:     Living Environment  Social support: With a significant other or spouse   Type of home: Apartment/condo   Stairs to enter the home: No       Ramp:     Stairs inside the home: No       Help at home: Self Cares (home health aide/personal care attendant, family, etc)  Equipment owned: Walker; Crutches     Employment: Not Applicable    Hobbies/Interests: walking, reading, [employer] - volunteers a couple days per month    Patient goals for therapy: walk normally, moving foot and ankle freely    Pain assessment: Pain denied     Objective   FOOT/ANKLE EVALUATION  PAIN: Pain Level with Use: 3/10  Pain Quality: Aching, Tingling, and  occasional but brief shock  INTEGUMENTARY (edema, incisions): Figure 8: L: 60cm, R: 58cm ; incision healing well, proximal incision covered in bandage as patient states 1 region remained open; foot turns slightly red/purple w/ AROM exercises  POSTURE:   GAIT:   Weightbearing Status: WBAT  Assistive Device(s): CAM, but working on weaning from the boot  Gait Deviations: Antalgic  Stance time decreased  Stride length decreased  Laura decreased  Heel strike decreased L, Push off decreased R, slight L foot ER  BALANCE/PROPRIOCEPTION: Single Leg Stance Eyes Open (seconds): L: unable, R: 3 sec, FT EO: 30 sec, FT EC: 30 sec w/ increased compensatory pattern, No time to assess tandem balance but likely impaired  WEIGHT BEARING ALIGNMENT:   NON-WEIGHTBEARING ALIGNMENT:    ROM:   (Degrees) Left AROM Left PROM  Right AROM Right PROM   Ankle Dorsiflexion 0  10    Ankle Plantarflexion 36  44    Ankle Inversion 6  12    Ankle Eversion 14  20    Great Toe Flexion 63  72    Great Toe Extension 8  20    Pain:   End feel:     STRENGTH:   Pain: - none + mild ++ moderate +++ severe  Strength Scale: 0-5/5 Left Right   Ankle Dorsiflexion -4 +4   Ankle Plantarflexion 3 5   Ankle Inversion 4 -4   Ankle Eversion +3 5   Great Toe Flexion     Great Toe Extension     Anterior Tibialis     Posterior Tibialis     Peroneals     Extensor Digitorum     Gastroc/Soleus       FLEXIBILITY: Decreased gastroc L, Decreased soleus L  SPECIAL TESTS:   FUNCTIONAL TESTS:   PALPATION:   JOINT MOBILITY:  assess at future appointment    Assessment & Plan   CLINICAL IMPRESSIONS  Medical Diagnosis: Closed fracture of distal end of left fibula, unspecified fracture morphology, initial encounter  Status post open reduction with internal fixation (ORIF) of fracture of ankle    Treatment Diagnosis: Closed fracture of distal end of left fibula, unspecified fracture morphology, initial encounter  Status post open reduction with internal fixation (ORIF) of fracture of  ankle, balance problem   Impression/Assessment: Patient is a 68 year old female with L ankle complaints.  The following significant findings have been identified: Pain, Decreased ROM/flexibility, Decreased strength, Impaired balance, Decreased proprioception, Edema, Impaired gait, Impaired muscle performance, and Decreased activity tolerance. These impairments interfere with their ability to perform self care tasks, recreational activities, household chores, household mobility, and community mobility as compared to previous level of function.     Clinical Decision Making (Complexity):  Clinical Presentation: Stable/Uncomplicated  Clinical Presentation Rationale: based on medical and personal factors listed in PT evaluation  Clinical Decision Making (Complexity): Low complexity    PLAN OF CARE  Treatment Interventions:  Interventions: Gait Training, Manual Therapy, Neuromuscular Re-education, Therapeutic Activity, Therapeutic Exercise, Self-Care/Home Management    Long Term Goals     PT Goal 1  Goal Identifier: walking  Goal Description: Patient will be able to walk 60 min w/o CAM in order for household mobility.  Goal Progress: currently able to ambulate 20 min, antalgic, out of CAM  Target Date: 05/15/24  PT Goal 2  Goal Identifier: stairs  Goal Description: Patient will be able to navigate 2 flights of stairs reciprocally, with use of 1 rail, in order for community mobility.  Goal Progress: has not attempted  Target Date: 06/12/24      Frequency of Treatment: 1x/wk for 6 weeks, then every other week for 6 weeks  Duration of Treatment: 12 weeks,    Recommended Referrals to Other Professionals:   Education Assessment:   Learner/Method: Patient;Listening;Demonstration;Pictures/Video  Education Comments: Educated on findings of exam, ankle anatomy, importance of PT, likely plan for PT, frequency/duration.    Risks and benefits of evaluation/treatment have been explained.   Patient/Family/caregiver agrees with Plan of  Care.     Evaluation Time:     PT Ramses Low Complexity Minutes (64105): 22       Signing Clinician: JAY Brooks [facility]                                                                                   OUTPATIENT PHYSICAL THERAPY      PLAN OF TREATMENT FOR OUTPATIENT REHABILITATION   Patient's Last Name, First Name, Vnaessa Zaragoza YOB: 1955   Provider's Name   [facility]   Medical Record No.  [id number]     Onset Date: 12/29/23  Start of Care Date: 03/20/24     Medical Diagnosis:  Closed fracture of distal end of left fibula, unspecified fracture morphology, initial encounter  Status post open reduction with internal fixation (ORIF) of fracture of ankle      PT Treatment Diagnosis:  Closed fracture of distal end of left fibula, unspecified fracture morphology, initial encounter  Status post open reduction with internal fixation (ORIF) of fracture of ankle, balance problem Plan of Treatment  Frequency/Duration: 1x/wk for 6 weeks, then every other week for 6 weeks/ 12 weeks,    Certification date from 03/20/24 to 06/12/24         See note for plan of treatment details and functional goals     Laura Ruelas PT                         I CERTIFY THE NEED FOR THESE SERVICES FURNISHED UNDER        THIS PLAN OF TREATMENT AND WHILE UNDER MY CARE     (Physician attestation of this document indicates review and certification of the therapy plan).              Referring Provider:  Doron Michelle    Initial Assessment  See Epic Evaluation- Start of Care Date: 03/20/24

## 2024-03-28 ENCOUNTER — THERAPY VISIT (OUTPATIENT)
Dept: PHYSICAL THERAPY | Facility: CLINIC | Age: 69
End: 2024-03-28
Attending: PODIATRIST
Payer: MEDICARE

## 2024-03-28 DIAGNOSIS — R26.89 BALANCE PROBLEMS: Primary | ICD-10-CM

## 2024-03-28 PROCEDURE — 97116 GAIT TRAINING THERAPY: CPT | Mod: GP | Performed by: PHYSICAL THERAPIST

## 2024-03-28 PROCEDURE — 97110 THERAPEUTIC EXERCISES: CPT | Mod: GP | Performed by: PHYSICAL THERAPIST

## 2024-04-03 ENCOUNTER — THERAPY VISIT (OUTPATIENT)
Dept: PHYSICAL THERAPY | Facility: CLINIC | Age: 69
End: 2024-04-03
Attending: PODIATRIST
Payer: MEDICARE

## 2024-04-03 DIAGNOSIS — R26.89 BALANCE PROBLEMS: Primary | ICD-10-CM

## 2024-04-03 PROCEDURE — 97110 THERAPEUTIC EXERCISES: CPT | Mod: GP | Performed by: PHYSICAL THERAPIST

## 2024-04-10 ENCOUNTER — THERAPY VISIT (OUTPATIENT)
Dept: PHYSICAL THERAPY | Facility: CLINIC | Age: 69
End: 2024-04-10
Payer: MEDICARE

## 2024-04-10 DIAGNOSIS — Z98.890 STATUS POST OPEN REDUCTION WITH INTERNAL FIXATION (ORIF) OF FRACTURE OF ANKLE: ICD-10-CM

## 2024-04-10 DIAGNOSIS — S82.832A CLOSED FRACTURE OF DISTAL END OF LEFT FIBULA, UNSPECIFIED FRACTURE MORPHOLOGY, INITIAL ENCOUNTER: ICD-10-CM

## 2024-04-10 DIAGNOSIS — Z87.81 STATUS POST OPEN REDUCTION WITH INTERNAL FIXATION (ORIF) OF FRACTURE OF ANKLE: ICD-10-CM

## 2024-04-10 DIAGNOSIS — R26.89 BALANCE PROBLEMS: Primary | ICD-10-CM

## 2024-04-10 PROCEDURE — 97112 NEUROMUSCULAR REEDUCATION: CPT | Mod: 59 | Performed by: PHYSICAL THERAPIST

## 2024-04-10 PROCEDURE — 97110 THERAPEUTIC EXERCISES: CPT | Mod: 59 | Performed by: PHYSICAL THERAPIST

## 2024-04-10 PROCEDURE — 97530 THERAPEUTIC ACTIVITIES: CPT | Mod: GP | Performed by: PHYSICAL THERAPIST

## 2024-04-18 ENCOUNTER — THERAPY VISIT (OUTPATIENT)
Dept: PHYSICAL THERAPY | Facility: CLINIC | Age: 69
End: 2024-04-18
Payer: MEDICARE

## 2024-04-18 DIAGNOSIS — Z98.890 STATUS POST OPEN REDUCTION WITH INTERNAL FIXATION (ORIF) OF FRACTURE OF ANKLE: ICD-10-CM

## 2024-04-18 DIAGNOSIS — R26.89 BALANCE PROBLEMS: ICD-10-CM

## 2024-04-18 DIAGNOSIS — S82.832A CLOSED FRACTURE OF DISTAL END OF LEFT FIBULA, UNSPECIFIED FRACTURE MORPHOLOGY, INITIAL ENCOUNTER: Primary | ICD-10-CM

## 2024-04-18 DIAGNOSIS — Z87.81 STATUS POST OPEN REDUCTION WITH INTERNAL FIXATION (ORIF) OF FRACTURE OF ANKLE: ICD-10-CM

## 2024-04-18 PROCEDURE — 97530 THERAPEUTIC ACTIVITIES: CPT | Mod: GP | Performed by: PHYSICAL THERAPIST

## 2024-04-18 PROCEDURE — 97112 NEUROMUSCULAR REEDUCATION: CPT | Mod: GP | Performed by: PHYSICAL THERAPIST

## 2024-04-18 PROCEDURE — 97110 THERAPEUTIC EXERCISES: CPT | Mod: GP | Performed by: PHYSICAL THERAPIST

## 2024-04-25 ENCOUNTER — THERAPY VISIT (OUTPATIENT)
Dept: PHYSICAL THERAPY | Facility: CLINIC | Age: 69
End: 2024-04-25
Payer: MEDICARE

## 2024-04-25 DIAGNOSIS — S82.832A CLOSED FRACTURE OF DISTAL END OF LEFT FIBULA, UNSPECIFIED FRACTURE MORPHOLOGY, INITIAL ENCOUNTER: Primary | ICD-10-CM

## 2024-04-25 DIAGNOSIS — R26.89 BALANCE PROBLEMS: ICD-10-CM

## 2024-04-25 DIAGNOSIS — Z98.890 STATUS POST OPEN REDUCTION WITH INTERNAL FIXATION (ORIF) OF FRACTURE OF ANKLE: ICD-10-CM

## 2024-04-25 DIAGNOSIS — Z87.81 STATUS POST OPEN REDUCTION WITH INTERNAL FIXATION (ORIF) OF FRACTURE OF ANKLE: ICD-10-CM

## 2024-04-25 PROCEDURE — 97112 NEUROMUSCULAR REEDUCATION: CPT | Mod: GP | Performed by: PHYSICAL THERAPIST

## 2024-04-25 PROCEDURE — 97110 THERAPEUTIC EXERCISES: CPT | Mod: GP | Performed by: PHYSICAL THERAPIST

## 2024-05-02 ENCOUNTER — THERAPY VISIT (OUTPATIENT)
Dept: PHYSICAL THERAPY | Facility: CLINIC | Age: 69
End: 2024-05-02
Payer: MEDICARE

## 2024-05-02 DIAGNOSIS — S82.832A CLOSED FRACTURE OF DISTAL END OF LEFT FIBULA, UNSPECIFIED FRACTURE MORPHOLOGY, INITIAL ENCOUNTER: Primary | ICD-10-CM

## 2024-05-02 DIAGNOSIS — Z87.81 STATUS POST OPEN REDUCTION WITH INTERNAL FIXATION (ORIF) OF FRACTURE OF ANKLE: ICD-10-CM

## 2024-05-02 DIAGNOSIS — R26.89 BALANCE PROBLEMS: ICD-10-CM

## 2024-05-02 DIAGNOSIS — Z98.890 STATUS POST OPEN REDUCTION WITH INTERNAL FIXATION (ORIF) OF FRACTURE OF ANKLE: ICD-10-CM

## 2024-05-02 PROCEDURE — 97110 THERAPEUTIC EXERCISES: CPT | Mod: GP | Performed by: PHYSICAL THERAPIST

## 2024-05-03 SDOH — HEALTH STABILITY: PHYSICAL HEALTH
ON AVERAGE, HOW MANY DAYS PER WEEK DO YOU ENGAGE IN MODERATE TO STRENUOUS EXERCISE (LIKE A BRISK WALK)?: PATIENT DECLINED

## 2024-05-03 SDOH — HEALTH STABILITY: PHYSICAL HEALTH: ON AVERAGE, HOW MANY MINUTES DO YOU ENGAGE IN EXERCISE AT THIS LEVEL?: PATIENT DECLINED

## 2024-05-03 ASSESSMENT — SOCIAL DETERMINANTS OF HEALTH (SDOH): HOW OFTEN DO YOU GET TOGETHER WITH FRIENDS OR RELATIVES?: ONCE A WEEK

## 2024-05-07 ENCOUNTER — TELEPHONE (OUTPATIENT)
Dept: PEDIATRICS | Facility: CLINIC | Age: 69
End: 2024-05-07

## 2024-05-07 ENCOUNTER — OFFICE VISIT (OUTPATIENT)
Dept: PEDIATRICS | Facility: CLINIC | Age: 69
End: 2024-05-07
Payer: MEDICARE

## 2024-05-07 VITALS
TEMPERATURE: 98 F | HEIGHT: 62 IN | DIASTOLIC BLOOD PRESSURE: 70 MMHG | SYSTOLIC BLOOD PRESSURE: 118 MMHG | WEIGHT: 205 LBS | HEART RATE: 91 BPM | OXYGEN SATURATION: 96 % | BODY MASS INDEX: 37.73 KG/M2 | RESPIRATION RATE: 18 BRPM

## 2024-05-07 DIAGNOSIS — E66.01 CLASS 2 SEVERE OBESITY DUE TO EXCESS CALORIES WITH SERIOUS COMORBIDITY AND BODY MASS INDEX (BMI) OF 37.0 TO 37.9 IN ADULT (H): ICD-10-CM

## 2024-05-07 DIAGNOSIS — D58.2 ELEVATED HEMOGLOBIN (H): ICD-10-CM

## 2024-05-07 DIAGNOSIS — E66.812 CLASS 2 SEVERE OBESITY DUE TO EXCESS CALORIES WITH SERIOUS COMORBIDITY AND BODY MASS INDEX (BMI) OF 37.0 TO 37.9 IN ADULT (H): ICD-10-CM

## 2024-05-07 DIAGNOSIS — E66.01 MORBID OBESITY (H): ICD-10-CM

## 2024-05-07 DIAGNOSIS — D75.1 SECONDARY ERYTHROCYTOSIS: ICD-10-CM

## 2024-05-07 DIAGNOSIS — E03.9 HYPOTHYROIDISM, UNSPECIFIED TYPE: ICD-10-CM

## 2024-05-07 DIAGNOSIS — Z00.00 ENCOUNTER FOR MEDICARE ANNUAL WELLNESS EXAM: Primary | ICD-10-CM

## 2024-05-07 DIAGNOSIS — E78.5 HYPERLIPIDEMIA LDL GOAL <160: ICD-10-CM

## 2024-05-07 DIAGNOSIS — R73.9 HYPERGLYCEMIA, UNSPECIFIED: ICD-10-CM

## 2024-05-07 PROBLEM — Z98.890 STATUS POST OPEN REDUCTION WITH INTERNAL FIXATION (ORIF) OF FRACTURE OF ANKLE: Status: RESOLVED | Noted: 2024-03-20 | Resolved: 2024-05-07

## 2024-05-07 PROBLEM — D50.9 IRON DEFICIENCY ANEMIA, UNSPECIFIED IRON DEFICIENCY ANEMIA TYPE: Status: RESOLVED | Noted: 2017-10-02 | Resolved: 2024-05-07

## 2024-05-07 PROBLEM — S82.832A CLOSED FRACTURE OF DISTAL END OF LEFT FIBULA, UNSPECIFIED FRACTURE MORPHOLOGY, INITIAL ENCOUNTER: Status: RESOLVED | Noted: 2024-03-20 | Resolved: 2024-05-07

## 2024-05-07 PROBLEM — Z87.81 STATUS POST OPEN REDUCTION WITH INTERNAL FIXATION (ORIF) OF FRACTURE OF ANKLE: Status: RESOLVED | Noted: 2024-03-20 | Resolved: 2024-05-07

## 2024-05-07 LAB
ALBUMIN SERPL BCG-MCNC: 4 G/DL (ref 3.5–5.2)
ALP SERPL-CCNC: 96 U/L (ref 40–150)
ALT SERPL W P-5'-P-CCNC: 18 U/L (ref 0–50)
ANION GAP SERPL CALCULATED.3IONS-SCNC: 9 MMOL/L (ref 7–15)
AST SERPL W P-5'-P-CCNC: 25 U/L (ref 0–45)
BASOPHILS # BLD AUTO: 0 10E3/UL (ref 0–0.2)
BASOPHILS NFR BLD AUTO: 0 %
BILIRUB SERPL-MCNC: 0.3 MG/DL
BUN SERPL-MCNC: 16 MG/DL (ref 8–23)
CALCIUM SERPL-MCNC: 9 MG/DL (ref 8.8–10.2)
CHLORIDE SERPL-SCNC: 105 MMOL/L (ref 98–107)
CHOLEST SERPL-MCNC: 167 MG/DL
CREAT SERPL-MCNC: 0.74 MG/DL (ref 0.51–0.95)
DEPRECATED HCO3 PLAS-SCNC: 26 MMOL/L (ref 22–29)
EGFRCR SERPLBLD CKD-EPI 2021: 88 ML/MIN/1.73M2
EOSINOPHIL # BLD AUTO: 0.1 10E3/UL (ref 0–0.7)
EOSINOPHIL NFR BLD AUTO: 2 %
ERYTHROCYTE [DISTWIDTH] IN BLOOD BY AUTOMATED COUNT: 14.5 % (ref 10–15)
FASTING STATUS PATIENT QL REPORTED: YES
GLUCOSE SERPL-MCNC: 106 MG/DL (ref 70–99)
HBA1C MFR BLD: 5.8 % (ref 0–5.6)
HCT VFR BLD AUTO: 48.8 % (ref 35–47)
HDLC SERPL-MCNC: 55 MG/DL
HGB BLD-MCNC: 15.4 G/DL (ref 11.7–15.7)
IMM GRANULOCYTES # BLD: 0 10E3/UL
IMM GRANULOCYTES NFR BLD: 0 %
LDLC SERPL CALC-MCNC: 88 MG/DL
LYMPHOCYTES # BLD AUTO: 1.2 10E3/UL (ref 0.8–5.3)
LYMPHOCYTES NFR BLD AUTO: 23 %
MCH RBC QN AUTO: 28.8 PG (ref 26.5–33)
MCHC RBC AUTO-ENTMCNC: 31.6 G/DL (ref 31.5–36.5)
MCV RBC AUTO: 91 FL (ref 78–100)
MONOCYTES # BLD AUTO: 0.5 10E3/UL (ref 0–1.3)
MONOCYTES NFR BLD AUTO: 9 %
NEUTROPHILS # BLD AUTO: 3.3 10E3/UL (ref 1.6–8.3)
NEUTROPHILS NFR BLD AUTO: 65 %
NONHDLC SERPL-MCNC: 112 MG/DL
PLATELET # BLD AUTO: 317 10E3/UL (ref 150–450)
POTASSIUM SERPL-SCNC: 4.6 MMOL/L (ref 3.4–5.3)
PROT SERPL-MCNC: 6.7 G/DL (ref 6.4–8.3)
RBC # BLD AUTO: 5.34 10E6/UL (ref 3.8–5.2)
SODIUM SERPL-SCNC: 140 MMOL/L (ref 135–145)
T4 FREE SERPL-MCNC: 1.23 NG/DL (ref 0.9–1.7)
TRIGL SERPL-MCNC: 118 MG/DL
TSH SERPL DL<=0.005 MIU/L-ACNC: 4.89 UIU/ML (ref 0.3–4.2)
WBC # BLD AUTO: 5.1 10E3/UL (ref 4–11)

## 2024-05-07 PROCEDURE — 99214 OFFICE O/P EST MOD 30 MIN: CPT | Mod: 25 | Performed by: NURSE PRACTITIONER

## 2024-05-07 PROCEDURE — G0439 PPPS, SUBSEQ VISIT: HCPCS | Performed by: NURSE PRACTITIONER

## 2024-05-07 PROCEDURE — 80053 COMPREHEN METABOLIC PANEL: CPT | Performed by: NURSE PRACTITIONER

## 2024-05-07 PROCEDURE — 83036 HEMOGLOBIN GLYCOSYLATED A1C: CPT | Mod: GZ | Performed by: NURSE PRACTITIONER

## 2024-05-07 PROCEDURE — 36415 COLL VENOUS BLD VENIPUNCTURE: CPT | Performed by: NURSE PRACTITIONER

## 2024-05-07 PROCEDURE — 84443 ASSAY THYROID STIM HORMONE: CPT | Performed by: NURSE PRACTITIONER

## 2024-05-07 PROCEDURE — 85025 COMPLETE CBC W/AUTO DIFF WBC: CPT | Performed by: NURSE PRACTITIONER

## 2024-05-07 PROCEDURE — 80061 LIPID PANEL: CPT | Performed by: NURSE PRACTITIONER

## 2024-05-07 PROCEDURE — 84439 ASSAY OF FREE THYROXINE: CPT | Performed by: NURSE PRACTITIONER

## 2024-05-07 RX ORDER — ATORVASTATIN CALCIUM 10 MG/1
10 TABLET, FILM COATED ORAL DAILY
Qty: 90 TABLET | Refills: 4 | Status: SHIPPED | OUTPATIENT
Start: 2024-05-07

## 2024-05-07 RX ORDER — PHENTERMINE HYDROCHLORIDE 15 MG/1
15 CAPSULE ORAL EVERY MORNING
Qty: 30 CAPSULE | Refills: 0 | Status: SHIPPED | OUTPATIENT
Start: 2024-05-07 | End: 2024-07-08 | Stop reason: DRUGHIGH

## 2024-05-07 RX ORDER — LEVOTHYROXINE SODIUM 100 UG/1
100 TABLET ORAL DAILY
Qty: 90 TABLET | Refills: 3 | Status: SHIPPED | OUTPATIENT
Start: 2024-05-07

## 2024-05-07 RX ORDER — RESPIRATORY SYNCYTIAL VIRUS VACCINE 120MCG/0.5
0.5 KIT INTRAMUSCULAR ONCE
Qty: 1 EACH | Refills: 0 | Status: CANCELLED | OUTPATIENT
Start: 2024-05-07 | End: 2024-05-07

## 2024-05-07 ASSESSMENT — PAIN SCALES - GENERAL: PAINLEVEL: NO PAIN (0)

## 2024-05-07 NOTE — TELEPHONE ENCOUNTER
Prior Authorization Retail Medication Request    Medication/Dose: Phentermine 15mg capsules  Diagnosis and ICD code (if different than what is on RX):    New/renewal/insurance change PA/secondary ins. PA:  Previously Tried and Failed:    Rationale:      Insurance   Primary:   Insurance ID:  [id number]    Secondary (if applicable):  Insurance ID:      Pharmacy Information (if different than what is on RX)  Name:  [facility]  Phone:  [phone]  Fax:    Prior authorization required.    Thank you,  Nelly Morel CPhT  [facility]

## 2024-05-07 NOTE — PROGRESS NOTES
Preventive Care Visit  [facility] LENARD Jaeger CNP, Internal Medicine - Pediatrics  May 7, 2024      Assessment & Plan     Encounter for Medicare annual wellness exam    - CBC with platelets and differential; Future  - TSH with free T4 reflex; Future  - Comprehensive metabolic panel (BMP + Alb, Alk Phos, ALT, AST, Total. Bili, TP); Future  - Hemoglobin A1c; Future  - CBC with platelets and differential  - TSH with free T4 reflex  - Comprehensive metabolic panel (BMP + Alb, Alk Phos, ALT, AST, Total. Bili, TP)  - Hemoglobin A1c    Hyperlipidemia LDL goal <160  Continue with statin, no cocnerns.   - Lipid panel reflex to direct LDL Non-fasting; Future  - atorvastatin (LIPITOR) 10 MG tablet; Take 1 tablet (10 mg) by mouth daily  - CBC with platelets and differential; Future  - TSH with free T4 reflex; Future  - Comprehensive metabolic panel (BMP + Alb, Alk Phos, ALT, AST, Total. Bili, TP); Future  - Hemoglobin A1c; Future  - Lipid panel reflex to direct LDL Non-fasting  - CBC with platelets and differential  - TSH with free T4 reflex  - Comprehensive metabolic panel (BMP + Alb, Alk Phos, ALT, AST, Total. Bili, TP)  - Hemoglobin A1c    Hypothyroidism, unspecified type  Due for labs, taking meds regularly.   - levothyroxine (SYNTHROID/LEVOTHROID) 100 MCG tablet; Take 1 tablet (100 mcg) by mouth daily  - CBC with platelets and differential; Future  - TSH with free T4 reflex; Future  - Comprehensive metabolic panel (BMP + Alb, Alk Phos, ALT, AST, Total. Bili, TP); Future  - Hemoglobin A1c; Future  - CBC with platelets and differential  - TSH with free T4 reflex  - Comprehensive metabolic panel (BMP + Alb, Alk Phos, ALT, AST, Total. Bili, TP)  - Hemoglobin A1c    Morbid obesity (H)  Discussed role of We discussed nonpharm interventions and medications. After reviewing options, will start phentermine. Follow-up in 1 month.   - CBC with platelets and differential; Future  - TSH with free  "T4 reflex; Future  - Comprehensive metabolic panel (BMP + Alb, Alk Phos, ALT, AST, Total. Bili, TP); Future  - Hemoglobin A1c; Future  - CBC with platelets and differential  - TSH with free T4 reflex  - Comprehensive metabolic panel (BMP + Alb, Alk Phos, ALT, AST, Total. Bili, TP)  - Hemoglobin A1c    Secondary erythrocytosis  We reviewed hem note, doesn't want to go back, discussed likely role of LITTLE, not yet diagnosed. Will refer for sleep study.   - Adult Sleep Eval & Management  Referral; Future  - CBC with platelets and differential; Future  - TSH with free T4 reflex; Future  - Comprehensive metabolic panel (BMP + Alb, Alk Phos, ALT, AST, Total. Bili, TP); Future  - Hemoglobin A1c; Future  - CBC with platelets and differential  - TSH with free T4 reflex  - Comprehensive metabolic panel (BMP + Alb, Alk Phos, ALT, AST, Total. Bili, TP)  - Hemoglobin A1c    Hyperglycemia, unspecified    - Hemoglobin A1c; Future  - Hemoglobin A1c    Elevated hemoglobin (H24)  See above, essentially negative work up, likely due to LITTLE, will refer.               BMI  Estimated body mass index is 37.49 kg/m  as calculated from the following:    Height as of this encounter: 1.575 m (5' 2\").    Weight as of this encounter: 93 kg (205 lb).       Counseling  Appropriate preventive services were discussed with this patient, including applicable screening as appropriate for fall prevention, nutrition, physical activity, Tobacco-use cessation, weight loss and cognition.  Checklist reviewing preventive services available has been given to the patient.  Reviewed patient's diet, addressing concerns and/or questions.   She is at risk for psychosocial distress and has been provided with information to reduce risk.           Ludy Schmitt is a 68 year old, presenting for the following:  Annual Visit    Has tried weight watchers, adri gaston, increasing activity, but no changes.     Wt Readings from Last 4 Encounters:   05/07/24 93 kg " (205 lb)   03/18/24 93 kg (205 lb)   02/26/24 93 kg (205 lb)   01/19/24 93 kg (205 lb)     Lab Results   Component Value Date    A1C 5.6 10/18/2022     History of elevated hgb, erythrocytosis. Did see hematology last aug and ruled in likely secondary erythrocytosis.     History hypothryoidism. No specific symptoms of concern.   TSH   Date Value Ref Range Status   06/15/2023 1.80 0.30 - 4.20 uIU/mL Final   10/18/2022 4.69 (H) 0.40 - 4.00 mU/L Final   10/19/2020 2.04 0.40 - 4.00 mU/L Final           5/7/2024     9:31 AM   Additional Questions   Roomed by Shirley Brown CMA         Health Care Directive  Patient has a Health Care Directive on file  Advance care planning document is on file but is outdated.  Patient encouraged to updated.    HPI  FASTING today          5/3/2024   General Health   How would you rate your overall physical health? Good   Feel stress (tense, anxious, or unable to sleep) Only a little   (!) STRESS CONCERN      5/3/2024   Nutrition   Diet: Regular (no restrictions)         5/3/2024   Exercise   Days per week of moderate/strenous exercise Patient declined   Average minutes spent exercising at this level Patient declined         5/3/2024   Social Factors   Frequency of gathering with friends or relatives Once a week   Worry food won't last until get money to buy more No   Food not last or not have enough money for food? No   Do you have housing?  Yes   Are you worried about losing your housing? No   Lack of transportation? No   Unable to get utilities (heat,electricity)? No         5/3/2024   Fall Risk   Fallen 2 or more times in the past year? No   Trouble with walking or balance? No          5/3/2024   Activities of Daily Living- Home Safety   Needs help with the following daily activites None of the above   Safety concerns in the home None of the above         5/3/2024   Dental   Dentist two times every year? Yes         5/3/2024   Hearing Screening   Hearing concerns? None of the above          5/3/2024   Driving Risk Screening   Patient/family members have concerns about driving No         5/3/2024   General Alertness/Fatigue Screening   Have you been more tired than usual lately? No         5/3/2024   Urinary Incontinence Screening   Bothered by leaking urine in past 6 months No         5/3/2024   TB Screening   Were you born outside of the [place]? No         Today's PHQ-2 Score:       2024     9:27 AM   PHQ-2 (  [employer])   Q1: Little interest or pleasure in doing things 0   Q2: Feeling down, depressed or hopeless 0   PHQ-2 Score 0   Q1: Little interest or pleasure in doing things Not at all   Q2: Feeling down, depressed or hopeless Not at all   PHQ-2 Score 0           5/3/2024   Substance Use   Alcohol more than 3/day or more than 7/wk No   Do you have a current opioid prescription? No   How severe/bad is pain from 1 to 10? 0/10 (No Pain)   Do you use any other substances recreationally? No     Social History     Tobacco Use    Smoking status: Former     Current packs/day: 0.00     Average packs/day: 0.5 packs/day for 10.0 years (5.0 ttl pk-yrs)     Types: Cigarettes     Start date: 1993     Quit date: 2003     Years since quittin.1    Smokeless tobacco: Never   Vaping Use    Vaping status: Never Used   Substance Use Topics    Alcohol use: Yes     Comment: holidays    Drug use: No           2023   LAST FHS-7 RESULTS   1st degree relative breast or ovarian cancer Yes            ASCVD Risk   The 10-year ASCVD risk score (Zoe LEIVA, et al., 2019) is: 6.1%    Values used to calculate the score:      Age: 68 years      Sex: Female      Is Non- : No      Diabetic: No      Tobacco smoker: No      Systolic Blood Pressure: 118 mmHg      Is BP treated: No      HDL Cholesterol: 52 mg/dL      Total Cholesterol: 161 mg/dL            Reviewed and updated as needed this visit by Provider     Meds                  Current providers sharing in care for this  "patient include:  Patient Care Team:  Tatiana Leahy APRN CNP as PCP - General (Family Practice)  Tatiana Leahy APRN CNP as Assigned PCP  Madelaine Lutz RPH as Pharmacist (Pharmacist)  Madelaine Lutz RPH as Assigned MTM Pharmacist  Toi Pastor MD (Internal Medicine)  Toi Pastor MD as Assigned Cancer Care Provider  Doron Michelle DPM as Assigned Musculoskeletal Provider    The following health maintenance items are reviewed in Epic and correct as of today:  Health Maintenance   Topic Date Due    RSV VACCINE (Pregnancy & 60+) (1 - 1-dose 60+ series) Never done    LIPID  01/28/2024    COVID-19 Vaccine (7 - 2023-24 season) 10/01/2024 (Originally 1/4/2024)    TSH W/FREE T4 REFLEX  06/15/2024    MEDICARE ANNUAL WELLNESS VISIT  05/07/2025    ANNUAL REVIEW OF HM ORDERS  05/07/2025    FALL RISK ASSESSMENT  05/07/2025    COLORECTAL CANCER SCREENING  07/13/2025    PAP FOLLOW-UP  11/28/2025    HPV FOLLOW-UP  11/28/2025    MAMMO SCREENING  11/29/2025    GLUCOSE  08/14/2026    DEXA  08/20/2028    ADVANCE CARE PLANNING  05/07/2029    DTAP/TDAP/TD IMMUNIZATION (3 - Td or Tdap) 11/09/2033    HEPATITIS C SCREENING  Completed    PHQ-2 (once per calendar year)  Completed    INFLUENZA VACCINE  Completed    Pneumococcal Vaccine: 65+ Years  Completed    ZOSTER IMMUNIZATION  Completed    IPV IMMUNIZATION  Aged Out    HPV IMMUNIZATION  Aged Out    MENINGITIS IMMUNIZATION  Aged Out    RSV MONOCLONAL ANTIBODY  Aged Out          Objective    Exam  /70 (BP Location: Right arm, Cuff Size: Adult Large)   Pulse 91   Temp 98  F (36.7  C) (Tympanic)   Resp 18   Ht 1.575 m (5' 2\")   Wt 93 kg (205 lb)   LMP 05/19/2008   SpO2 96%   BMI 37.49 kg/m     Estimated body mass index is 37.49 kg/m  as calculated from the following:    Height as of this encounter: 1.575 m (5' 2\").    Weight as of this encounter: 93 kg (205 lb).    Physical Exam  GENERAL: alert and no distress  EYES: Eyes " grossly normal to inspection, PERRL and conjunctivae and sclerae normal  HENT: ear canals and TM's normal, nose and mouth without ulcers or lesions  NECK: no adenopathy, no asymmetry, masses, or scars  RESP: lungs clear to auscultation - no rales, rhonchi or wheezes  CV: regular rate and rhythm, normal S1 S2, no S3 or S4, no murmur, click or rub, no peripheral edema  MS: no gross musculoskeletal defects noted, no edema  PSYCH: mentation appears normal, affect normal/bright        5/7/2024   Mini Cog   Clock Draw Score 2 Normal   3 Item Recall 3 objects recalled   Mini Cog Total Score 5              Signed Electronically by: LENARD Higuera CNP

## 2024-05-09 ENCOUNTER — THERAPY VISIT (OUTPATIENT)
Dept: PHYSICAL THERAPY | Facility: CLINIC | Age: 69
End: 2024-05-09
Payer: MEDICARE

## 2024-05-09 DIAGNOSIS — R26.89 BALANCE PROBLEMS: Primary | ICD-10-CM

## 2024-05-09 PROCEDURE — 97110 THERAPEUTIC EXERCISES: CPT | Mod: GP | Performed by: PHYSICAL THERAPIST

## 2024-05-21 NOTE — TELEPHONE ENCOUNTER
Central Prior Authorization Team   Phone: [phone]    PA Initiation    Medication: Phentermine 15mg capsules  Insurance Company:  - Phone [phone] Fax [phone]  Pharmacy Filling the Rx: [facility] - [id number]   Filling Pharmacy Phone: [phone]  Filling Pharmacy Fax:    Start Date: 5/21/2024

## 2024-05-21 NOTE — TELEPHONE ENCOUNTER
Prior Authorization Approval    Authorization Effective Date: 4/21/2024  Authorization Expiration Date: 8/19/2024  Medication: Phentermine 15mg capsules  Approved Dose/Quantity:    Reference #:     Insurance Company: [employer] [phone] Fax [phone]  Expected CoPay:       CoPay Card Available:      Foundation Assistance Needed:    Which Pharmacy is filling the prescription (Not needed for infusion/clinic administered): [facility] [place], [place] - [address]   Pharmacy Notified:  Yes  Patient Notified:  **Instructed pharmacy to notify patient when script is ready to /ship.**             Finasteride Counseling:  I discussed with the patient the risks of use of finasteride including but not limited to decreased libido, decreased ejaculate volume, gynecomastia, and depression. Women should not handle medication.  All of the patient's questions and concerns were addressed. Finasteride Male Counseling: Finasteride Counseling:  I discussed with the patient the risks of use of finasteride including but not limited to decreased libido, decreased ejaculate volume, gynecomastia, and depression. Women should not handle medication.  All of the patient's questions and concerns were addressed.

## 2024-05-22 ENCOUNTER — THERAPY VISIT (OUTPATIENT)
Dept: PHYSICAL THERAPY | Facility: CLINIC | Age: 69
End: 2024-05-22
Payer: MEDICARE

## 2024-05-22 DIAGNOSIS — R26.89 BALANCE PROBLEMS: Primary | ICD-10-CM

## 2024-05-22 PROCEDURE — 97110 THERAPEUTIC EXERCISES: CPT | Mod: GP | Performed by: PHYSICAL THERAPIST

## 2024-05-22 PROCEDURE — 97140 MANUAL THERAPY 1/> REGIONS: CPT | Mod: GP | Performed by: PHYSICAL THERAPIST

## 2024-06-01 ENCOUNTER — MYC MEDICAL ADVICE (OUTPATIENT)
Dept: PEDIATRICS | Facility: CLINIC | Age: 69
End: 2024-06-01
Payer: MEDICARE

## 2024-06-03 NOTE — TELEPHONE ENCOUNTER
"Per VV 5/7  \"Morbid obesity (H)  Discussed role of We discussed nonpharm interventions and medications. After reviewing options, will start phentermine. Follow-up in 1 month.\"    RN recommended E-visit (per clinic policy) in order to address patient request for medical advice.           Marie ORR RN on 6/3/2024 at 7:58 AM     "

## 2024-06-06 ENCOUNTER — THERAPY VISIT (OUTPATIENT)
Dept: PHYSICAL THERAPY | Facility: CLINIC | Age: 69
End: 2024-06-06
Payer: MEDICARE

## 2024-06-06 DIAGNOSIS — R26.89 BALANCE PROBLEMS: Primary | ICD-10-CM

## 2024-06-06 PROCEDURE — 97110 THERAPEUTIC EXERCISES: CPT | Mod: GP | Performed by: PHYSICAL THERAPIST

## 2024-06-06 NOTE — PROGRESS NOTES
06/06/24 0500   Appointment Info   Signing clinician's name / credentials Alfred Gresham DPT   Total/Authorized Visits 9 (E&T)   Visits Used 10   Medical Diagnosis Closed fracture of distal end of left fibula, unspecified fracture morphology, initial encounter  Status post open reduction with internal fixation (ORIF) of fracture of ankle   PT Tx Diagnosis Closed fracture of distal end of left fibula, unspecified fracture morphology, initial encounter  Status post open reduction with internal fixation (ORIF) of fracture of ankle, balance problem   Quick Adds Certification   Progress Note/Certification   Start of Care Date 03/20/24   Onset of illness/injury or Date of Surgery 12/29/23   Therapy Frequency 1x/wk for 6 weeks, then every other week for 6 weeks   Predicted Duration 12 weeks   Certification date from 03/20/24   Certification date to 06/12/24   Progress Note Due Date 05/19/24   Progress Note Completed Date 03/20/24   GOALS   PT Goals 2   PT Goal 1   Goal Identifier walking   Goal Description Patient will be able to walk 60 min w/o CAM in order for household mobility.   Goal Progress able to go about 30 minutes at least w/o AD, might be able to go up to an hour with some fatigue   Target Date 05/15/24   PT Goal 2   Goal Identifier stairs   Goal Description Patient will be able to navigate 2 flights of stairs reciprocally, with use of 1 rail, in order for community mobility.   Goal Progress has not attempted,   Target Date 06/12/24   Subjective Report   Subjective Report Pt continues to do well, no problems with everyday living, able to walk as much as she would like, going down stairs is a little challening, but is able to manage them.   Objective Measures   Objective Measures Objective Measure 1;Objective Measure 2   Objective Measure 1   Objective Measure Ambulation   Details jona wnl   Objective Measure 2   Objective Measure ROM   Details L ankle ROM = R ankle ROM   Treatment Interventions (PT)    Interventions Therapeutic Procedure/Exercise;Gait Training;Neuromuscular Re-education;Therapeutic Activity;Manual Therapy   Therapeutic Procedure/Exercise   Therapeutic Procedures: strength, endurance, ROM, flexibility minutes (88040) 30   Therapeutic Procedures Ther Proc 2;Ther Proc 3   Ther Proc 1 Bike SH2   Ther Proc 1 - Details 5 min, WL2 for ROM and LE strengthening   Ther Proc 2 Walking   Ther Proc 2 - Details sidestep   Ther Proc 3 2x 20, pink band   Ther Proc 3 - Details Focus on standing exercises   PTRx Ther Proc 1 Plantar Fascia Stretch Fold and Hold   PTRx Ther Proc 1 - Details Anterior ankle stretch: HEP Anterior/lateral ankle stretch: HEP   PTRx Ther Proc 2 Standing Gastroc Stretch   PTRx Ther Proc 2 - Details HEP   PTRx Ther Proc 3 Standing Gastroc Stretch   PTRx Ther Proc 3 - Details 2x 30 sec foot against wall   PTRx Ther Proc 4 Bridging #3 Leg Crossed   PTRx Ther Proc 4 - Details 15x   PTRx Ther Proc 5 Theraband Ankle Dorsiflexion   PTRx Ther Proc 5 - Details 15x red   PTRx Ther Proc 6 Theraband Ankle Inversion   PTRx Ther Proc 6 - Details 15x red   PTRx Ther Proc 7 Theraband Diagonal Peroneals   PTRx Ther Proc 7 - Details 15x red   PTRx Ther Proc 8 Hip Abduction Straight Leg Raise   PTRx Ther Proc 8 - Details No Notes   PTRx Ther Proc 9 Toe Raises   PTRx Ther Proc 9 - Details 20x   PTRx Ther Proc 10 Ankle Stabilization Inversion   PTRx Ther Proc 10 - Details HEP   Skilled Intervention progression of LE strengthening to return to PLOF   Patient Response/Progress no pain, tolerates well   Therapeutic Activity   PTRx Ther Act 1 Forward Step   PTRx Ther Act 1 - Details 2in x 5 reps progressed up to 4 in step 2 x 10 reps SBA - fearful but does well   PTRx Ther Act 2 Step Up and Over - Reciprocal Right Foot   PTRx Ther Act 2 - Details NT   Skilled Intervention exercises to improve function on stairs   Patient Response/Progress continued to be fearful of stairs but tolerating progression well    Neuromuscular Re-education   Neuro Re-ed 1 static balance   Neuro Re-ed 1 - Details HEP   PTRx Neuro Re-ed 1 Tandem Stance   PTRx Neuro Re-ed 1 - Details 30 sec x 3- added to HEP left foot behind   PTRx Neuro Re-ed 2 Tandem Stance   PTRx Neuro Re-ed 2 - Details HEP   Skilled Intervention balance exercises to reduce falls risk and improve function   Patient Response/Progress SLS and ariex foam continues to be challenging   Gait Training   Gait Training Gait 2   Manual Therapy   Manual Therapy 1 joint mob grade 3 TC post/ant glide to facilitate ankle PF/DF   Education   Learner/Method Patient;Listening;Demonstration;Pictures/Video   Plan   Home program see PTrx but patient prefers prints out   Plan for next session focus on stair progression, balance ex on airex and dynamic balance   Total Session Time   Timed Code Treatment Minutes 30   Total Treatment Time (sum of timed and untimed services) 30       PLAN  Return to MD for follow up.  Recommend pt transition to working on HEP on her own as her function allows for it.    Beginning/End Dates of Progress Note Reporting Period:  03/20/24 to 06/06/2024    Referring Provider:  Doron Michelle

## 2024-06-07 ENCOUNTER — VIRTUAL VISIT (OUTPATIENT)
Dept: PEDIATRICS | Facility: CLINIC | Age: 69
End: 2024-06-07
Payer: MEDICARE

## 2024-06-07 DIAGNOSIS — E66.01 CLASS 2 SEVERE OBESITY DUE TO EXCESS CALORIES WITH SERIOUS COMORBIDITY AND BODY MASS INDEX (BMI) OF 36.0 TO 36.9 IN ADULT (H): Primary | ICD-10-CM

## 2024-06-07 DIAGNOSIS — E66.812 CLASS 2 SEVERE OBESITY DUE TO EXCESS CALORIES WITH SERIOUS COMORBIDITY AND BODY MASS INDEX (BMI) OF 36.0 TO 36.9 IN ADULT (H): Primary | ICD-10-CM

## 2024-06-07 PROCEDURE — 99441 PR PHYSICIAN TELEPHONE EVALUATION 5-10 MIN: CPT | Mod: 93 | Performed by: NURSE PRACTITIONER

## 2024-06-07 RX ORDER — PHENTERMINE HYDROCHLORIDE 30 MG/1
30 CAPSULE ORAL EVERY MORNING
Qty: 30 CAPSULE | Refills: 0 | Status: SHIPPED | OUTPATIENT
Start: 2024-06-07 | End: 2024-07-08 | Stop reason: DRUGHIGH

## 2024-06-07 RX ORDER — PHENTERMINE HYDROCHLORIDE 15 MG/1
15 CAPSULE ORAL EVERY MORNING
Qty: 30 CAPSULE | Refills: 0 | Status: CANCELLED | OUTPATIENT
Start: 2024-06-07

## 2024-06-07 NOTE — PROGRESS NOTES
"Keshia is a 68 year old who is being evaluated via a billable telephone visit.    What phone number would you like to be contacted at? [phone]  How would you like to obtain your AVS? Tala  Originating Location (pt. Location): Home    Distant Location (provider location):  On-site    Assessment & Plan     Class 2 severe obesity due to excess calories with serious comorbidity and body mass index (BMI) of 36.0 to 36.9 in adult (H)  Overall has liked phentermine, notices a chagne in appetite and has increased exercise. Will increase dose of phentermine and follow-up in 1 month.   - phentermine 30 MG capsule; Take 1 capsule (30 mg) by mouth every morning          BMI  Estimated body mass index is 37.49 kg/m  as calculated from the following:    Height as of 5/7/24: 1.575 m (5' 2\").    Weight as of 5/7/24: 93 kg (205 lb).             Subjective   Keshia is a 68 year old, presenting for the following health issues:  Recheck Medication        6/7/2024    10:13 AM   Additional Questions   Roomed by Tracy Medrano of Present Illness       Reason for visit:  Follow up on diet medication    She eats 2-3 servings of fruits and vegetables daily.She consumes 0 sweetened beverage(s) daily.She exercises with enough effort to increase her heart rate 30 to 60 minutes per day.  She exercises with enough effort to increase her heart rate 4 days per week.   She is taking medications regularly.       Medication Followup of Phentermine  Taking Medication as prescribed: yes  Side Effects:  None  Medication Helping Symptoms:  yes    Started phent last month, since then, she notes it's working well, no se or issues. Last wt was 198#. She notes she has been intentionally been increasing walking per day. She has been limiting portion sizes, noticed appetite decrease.     Wt Readings from Last 4 Encounters:   05/07/24 93 kg (205 lb)   03/18/24 93 kg (205 lb)   02/26/24 93 kg (205 lb)   01/19/24 93 kg (205 lb)                 Objective  " "  Vitals - Patient Reported  Weight (Patient Reported): 89.8 kg (198 lb)  Height (Patient Reported): 160 cm (5' 3\")  BMI (Based on Pt Reported Ht/Wt): 35.07      Physical Exam   General: Alert and no distress //Respiratory: No audible wheeze, cough, or shortness of breath // Psychiatric:  Appropriate affect, tone, and pace of words            Phone call duration: 6 minutes  Signed Electronically by: LENARD Higuera CNP    "

## 2024-06-18 ENCOUNTER — ANCILLARY PROCEDURE (OUTPATIENT)
Dept: GENERAL RADIOLOGY | Facility: CLINIC | Age: 69
End: 2024-06-18
Attending: PODIATRIST
Payer: MEDICARE

## 2024-06-18 ENCOUNTER — OFFICE VISIT (OUTPATIENT)
Dept: PODIATRY | Facility: CLINIC | Age: 69
End: 2024-06-18
Payer: MEDICARE

## 2024-06-18 VITALS — WEIGHT: 197 LBS | DIASTOLIC BLOOD PRESSURE: 80 MMHG | BODY MASS INDEX: 36.03 KG/M2 | SYSTOLIC BLOOD PRESSURE: 121 MMHG

## 2024-06-18 DIAGNOSIS — S82.832A CLOSED FRACTURE OF DISTAL END OF LEFT FIBULA, UNSPECIFIED FRACTURE MORPHOLOGY, INITIAL ENCOUNTER: Primary | ICD-10-CM

## 2024-06-18 DIAGNOSIS — Z87.81 STATUS POST OPEN REDUCTION WITH INTERNAL FIXATION (ORIF) OF FRACTURE OF ANKLE: ICD-10-CM

## 2024-06-18 DIAGNOSIS — Z98.890 STATUS POST OPEN REDUCTION WITH INTERNAL FIXATION (ORIF) OF FRACTURE OF ANKLE: ICD-10-CM

## 2024-06-18 DIAGNOSIS — S82.832A CLOSED FRACTURE OF DISTAL END OF LEFT FIBULA, UNSPECIFIED FRACTURE MORPHOLOGY, INITIAL ENCOUNTER: ICD-10-CM

## 2024-06-18 PROCEDURE — 99213 OFFICE O/P EST LOW 20 MIN: CPT | Performed by: PODIATRIST

## 2024-06-18 PROCEDURE — 73610 X-RAY EXAM OF ANKLE: CPT | Mod: TC | Performed by: RADIOLOGY

## 2024-06-18 NOTE — LETTER
"6/18/2024      Vanessa Mckeon  [address]  [place] [address]      Dear Colleague,    Thank you for referring your patient, Vanessa Mckeon, to the [facility]. Please see a copy of my visit note below.    Foot & Ankle Surgery  June 18, 2024    S:  Patient in today sp ORIF left distal fibular fracture on 1/9/2024.  Pain levels minimal.  She states the ankle is \"good\".  She is back to regular shoes and activities    LMP 05/19/2008       ROS - positive for CC.  Patient denies current nausea, vomiting, chills, fevers, belly pain, calf pain, chest pain or SOB.  Complete remainder of ROS is otherwise neg.    PE -incision is fully healed.  There is no pain on palpation or with manipulation of the ankle today..  Skin shows no trophic, color or temperature changes otherwise.  No calf redness, swelling or pain noted otherwise.    Imaging -3 views weightbearing left ankle -intact hardware.  The mortise is anatomic.  The fracture line is no longer visible    A/P - 68 year old yo patient approx 5+ months sp above procedure  -I personally interpreted and reviewed today's x-rays.  The hardware is intact, the ankle mortise is anatomic, and the fracture line is no longer readily visible  -The patient is doing well clinically, back to regular shoes and activities without discomfort.  -No complaint of functional deficits at this point.  As such, no further follow-up is needed but I encouraged her to call with any questions or concerns    Follow up  -as needed or sooner with acute issues        Doron Michelle DPM FACFAS FACFAOM  Podiatric Foot & Ankle Surgeon  [facility]  [phone]    Disclaimer: This note consists of symbols derived from keyboarding, dictation and/or voice recognition software. As a result, there may be errors in the script that have gone undetected. Please consider this when interpreting information found in this chart.      Again, " thank you for allowing me to participate in the care of your patient.        Sincerely,        Doron Michelle DPM, MANI

## 2024-06-18 NOTE — PROGRESS NOTES
"Foot & Ankle Surgery  June 18, 2024    S:  Patient in today sp ORIF left distal fibular fracture on 1/9/2024.  Pain levels minimal.  She states the ankle is \"good\".  She is back to regular shoes and activities    LMP 05/19/2008       ROS - positive for CC.  Patient denies current nausea, vomiting, chills, fevers, belly pain, calf pain, chest pain or SOB.  Complete remainder of ROS is otherwise neg.    PE -incision is fully healed.  There is no pain on palpation or with manipulation of the ankle today..  Skin shows no trophic, color or temperature changes otherwise.  No calf redness, swelling or pain noted otherwise.    Imaging -3 views weightbearing left ankle -intact hardware.  The mortise is anatomic.  The fracture line is no longer visible    A/P - 68 year old yo patient approx 5+ months sp above procedure  -I personally interpreted and reviewed today's x-rays.  The hardware is intact, the ankle mortise is anatomic, and the fracture line is no longer readily visible  -The patient is doing well clinically, back to regular shoes and activities without discomfort.  -No complaint of functional deficits at this point.  As such, no further follow-up is needed but I encouraged her to call with any questions or concerns    Follow up  -as needed or sooner with acute issues        Doron Michelle DPM FACFAS FACFAOM  Podiatric Foot & Ankle Surgeon  [facility]  [phone]    Disclaimer: This note consists of symbols derived from keyboarding, dictation and/or voice recognition software. As a result, there may be errors in the script that have gone undetected. Please consider this when interpreting information found in this chart.    "

## 2024-07-08 ENCOUNTER — VIRTUAL VISIT (OUTPATIENT)
Dept: PEDIATRICS | Facility: CLINIC | Age: 69
End: 2024-07-08
Payer: MEDICARE

## 2024-07-08 DIAGNOSIS — E66.811 CLASS 1 OBESITY DUE TO EXCESS CALORIES WITHOUT SERIOUS COMORBIDITY WITH BODY MASS INDEX (BMI) OF 33.0 TO 33.9 IN ADULT: Primary | ICD-10-CM

## 2024-07-08 DIAGNOSIS — N95.0 POSTMENOPAUSAL BLEEDING: ICD-10-CM

## 2024-07-08 DIAGNOSIS — E66.09 CLASS 1 OBESITY DUE TO EXCESS CALORIES WITHOUT SERIOUS COMORBIDITY WITH BODY MASS INDEX (BMI) OF 33.0 TO 33.9 IN ADULT: Primary | ICD-10-CM

## 2024-07-08 PROCEDURE — 99441 PR PHYSICIAN TELEPHONE EVALUATION 5-10 MIN: CPT | Mod: 93 | Performed by: NURSE PRACTITIONER

## 2024-07-08 RX ORDER — PHENTERMINE HYDROCHLORIDE 30 MG/1
30 CAPSULE ORAL EVERY MORNING
Qty: 30 CAPSULE | Refills: 0 | Status: CANCELLED | OUTPATIENT
Start: 2024-07-08

## 2024-07-08 RX ORDER — PHENTERMINE HYDROCHLORIDE 37.5 MG/1
37.5 TABLET ORAL
Qty: 30 TABLET | Refills: 2 | Status: SHIPPED | OUTPATIENT
Start: 2024-07-08 | End: 2024-09-27

## 2024-07-08 NOTE — PROGRESS NOTES
"Keshia is a 68 year old who is being evaluated via a billable telephone visit.    What phone number would you like to be contacted at? [phone]  How would you like to obtain your AVS? Tala  Originating Location (pt. Location): Home    Distant Location (provider location):  On-site    Assessment & Plan     Class 1 obesity due to excess calories without serious comorbidity with body mass index (BMI) of 33.0 to 33.9 in adult  Doing well on current regime, has had staedy wt loss. Will increase dose and follow-up in 2-3 months. Endorsed portion control and continued walking.   - phentermine (ADIPEX-P) 37.5 MG tablet; Take 1 tablet (37.5 mg) by mouth every morning (before breakfast)    Postmenopausal bleeding  She did have this two yrs ago, had d&c. Current bleeding is light. Will refer to US and OBGYN consult.   - US Pelvic Complete with Transvaginal; Future  - Ob/Gyn  Referral; Future    The longitudinal plan of care for the diagnosis(es)/condition(s) as documented were addressed during this visit. Due to the added complexity in care, I will continue to support Keshia in the subsequent management and with ongoing continuity of care.      BMI  Estimated body mass index is 36.03 kg/m  as calculated from the following:    Height as of 5/7/24: 1.575 m (5' 2\").    Weight as of 6/18/24: 89.4 kg (197 lb).             Subjective   Keshia is a 68 year old, presenting for the following health issues:  Weight Problem        7/8/2024    10:44 AM   Additional Questions   Roomed by Shirley Brown CMA       F/      History of Present Illness       Reason for visit:  Follow up for diet medication    She eats 2-3 servings of fruits and vegetables daily.She consumes 0 sweetened beverage(s) daily.She exercises with enough effort to increase her heart rate 30 to 60 minutes per day.  She exercises with enough effort to increase her heart rate 4 days per week.   She is taking medications regularly.     Follow up on phentermine. " Last weight 190, down another 8# over the past month. DOing a lot of walking, portion control. At last visit we increased dose to 30 mg.     Wt Readings from Last 4 Encounters:   06/18/24 89.4 kg (197 lb)   05/07/24 93 kg (205 lb)   03/18/24 93 kg (205 lb)   02/26/24 93 kg (205 lb)     Started spotting last Wednesday, can go most of the day with only one pad. She did have spotting in 2022, had a D and C.           Objective    Vitals - Patient Reported  Weight (Patient Reported): 86.5 kg (190 lb 12.8 oz)      Physical Exam   General: Alert and no distress //Respiratory: No audible wheeze, cough, or shortness of breath // Psychiatric:  Appropriate affect, tone, and pace of words          Phone call duration: 9 minutes  Signed Electronically by: LENARD Higuera CNP

## 2024-07-23 ENCOUNTER — HOSPITAL ENCOUNTER (OUTPATIENT)
Dept: ULTRASOUND IMAGING | Facility: CLINIC | Age: 69
Discharge: HOME | End: 2024-07-23
Attending: NURSE PRACTITIONER | Admitting: NURSE PRACTITIONER
Payer: MEDICARE

## 2024-07-23 DIAGNOSIS — N95.0 POSTMENOPAUSAL BLEEDING: ICD-10-CM

## 2024-07-23 PROCEDURE — 76830 TRANSVAGINAL US NON-OB: CPT

## 2024-07-23 PROCEDURE — 76856 US EXAM PELVIC COMPLETE: CPT

## 2024-08-02 ENCOUNTER — OFFICE VISIT (OUTPATIENT)
Dept: OBGYN | Facility: CLINIC | Age: 69
End: 2024-08-02
Attending: NURSE PRACTITIONER
Payer: MEDICARE

## 2024-08-02 ENCOUNTER — TELEPHONE (OUTPATIENT)
Dept: OBGYN | Facility: CLINIC | Age: 69
End: 2024-08-02

## 2024-08-02 VITALS — DIASTOLIC BLOOD PRESSURE: 82 MMHG | WEIGHT: 190 LBS | SYSTOLIC BLOOD PRESSURE: 126 MMHG | BODY MASS INDEX: 34.75 KG/M2

## 2024-08-02 DIAGNOSIS — N88.2 STENOSIS, CERVIX: Primary | ICD-10-CM

## 2024-08-02 DIAGNOSIS — N95.0 POSTMENOPAUSAL BLEEDING: ICD-10-CM

## 2024-08-02 PROCEDURE — 99214 OFFICE O/P EST MOD 30 MIN: CPT | Performed by: OBSTETRICS & GYNECOLOGY

## 2024-08-02 RX ORDER — MISOPROSTOL 200 UG/1
400 TABLET ORAL ONCE
Qty: 2 TABLET | Refills: 0 | Status: SHIPPED | OUTPATIENT
Start: 2024-08-02 | End: 2024-08-02

## 2024-08-02 NOTE — NURSING NOTE
"Chief Complaint   Patient presents with    Abnormal Uterine Bleeding     PMB-spotting, has not had bleeding the last couple days, US 24       Initial /82   Wt 86.2 kg (190 lb)   LMP 2008   BMI 34.75 kg/m   Estimated body mass index is 34.75 kg/m  as calculated from the following:    Height as of 24: 1.575 m (5' 2\").    Weight as of this encounter: 86.2 kg (190 lb).  BP completed using cuff size: large    Questioned patient about current smoking habits.  Pt. quit smoking some time ago.        "

## 2024-08-02 NOTE — TELEPHONE ENCOUNTER
Surgeon: Dr Yolette Agrawal   Assist:  No   Location: [facility] or [facility], patient preference   Date/time preference:  per patient     Surgery:  hysteroscopy, polypectomy, dilation and curettage with Myosure   Length of Surgery:  30 min   Diagnosis:  postmenopausal bleeding, thickened endometrial stripe on ultrasound   Anesthesia type:  GENERAL     Special instructions / equipment:  [place]   Am admit or same day: SAME DAY   Bowel prep: No   Pre op: PCP   Office visit with surgeon prior to surgery: No   Schedule Post Op: 1-2 weeks     Yolette Agrawal MD   [facility] Obstetrics and Gynecology

## 2024-08-02 NOTE — TELEPHONE ENCOUNTER
Type of surgery: hysteroscopy, polypectomy, dilation and curettage with  myosure  Location of surgery: [facility]  Date and time of surgery: 8/8/24 @ 8:15 am  Surgeon: Dr. Agrawal  Pre-Op Appt Date: 8/5/24  Post-Op Appt Date: 8/16/24   Packet sent out: Yes  Pre-cert/Authorization completed:  No  Date: 8/2/24

## 2024-08-02 NOTE — PROGRESS NOTES
SUBJECTIVE:                                                     Vanessa Mckeon is a 68 year old female  who presents today for postmenopausal bleeding.    Postmenopausal bleeding in , and endometrial biopsy was attempted in clinic.  This was not possible due to cervical stenosis, show she underwent hysteroscopy and dilation and curettage with polypectomy in the operating room.  She had for 5 days of bleeding during this episode, is no longer bleeding.  She had no pain.  Bleeding was light.  She underwent an ultrasound which shows a 10 mm endometrial stripe.    We discussed that tissue biopsy is necessary in this situation.  I offered her the opportunity to proceed directly to hysteroscopy with dilation and curettage and possible polypectomy given her previous history.  She is opting to do this in lieu of an attempt at endometrial biopsy today.      Problem list and histories reviewed & adjusted, as indicated.  Additional history: as documented.    Patient Active Problem List   Diagnosis    COLON POLYPS    HYPERLIPIDEMIA LDL GOAL <160    Lumbago    Hiatal hernia    Plantar fasciitis, bilateral    Hypothyroidism, unspecified type    Gastroesophageal reflux disease without esophagitis    Cervical high risk HPV (human papillomavirus) test positive    Class 1 obesity due to excess calories without serious comorbidity with body mass index (BMI) of 33.0 to 33.9 in adult    Mild dysplasia of cervix    Morbid obesity (H)    Balance problems    Secondary erythrocytosis    Elevated hemoglobin (H24)     Past Surgical History:   Procedure Laterality Date    BIOPSY OF BREAST, INCISIONAL  2001    left breast    COLONOSCOPY  10/08/2011    Procedure:COMBINED COLONOSCOPY, SINGLE BIOPSY/POLYPECTOMY BY BIOPSY; COLONOSCOPY; Surgeon:DARIANA RUIZ; Location: GI    COLONOSCOPY N/A 10/13/2015    Procedure: COLONOSCOPY;  Surgeon: Toi Sotelo MD;  Location:  GI    COLONOSCOPY Left 2020    Procedure:  COLONOSCOPY;  Surgeon: Toi Sotelo MD;  Location:  GI    DILATION AND CURETTAGE, OPERATIVE HYSTEROSCOPY WITH MORCELLATOR, COMBINED N/A 2022    Procedure: Hystersocopy, dilation and curettage using morcellator under abdominal ultrasound guidance.;  Surgeon: Linda Schmitt MD;  Location:  OR    ESOPHAGOSCOPY, GASTROSCOPY, DUODENOSCOPY (EGD), COMBINED Left 2020    Procedure: ESOPHAGOGASTRODUODENOSCOPY, WITH BIOPSies using biopsy forcep;  Surgeon: Toi Sotelo MD;  Location:  GI    OPEN REDUCTION INTERNAL FIXATION ANKLE Left 2024    Procedure: OPEN REDUCTION INTERNAL FIXATION LEFT ANKLE FRACTURE;  Surgeon: Doron Michelle DPM;  Location:  OR    PHACOEMULSIFICATION CLEAR CORNEA WITH STANDARD INTRAOCULAR LENS IMPLANT  10/04/2012    Procedure: PHACOEMULSIFICATION CLEAR CORNEA WITH STANDARD INTRAOCULAR LENS IMPLANT;  RIGHT PHACOEMULSIFICATION CLEAR CORNEA WITH STANDARD INTRAOCULAR LENS IMPLANT;  Surgeon: Óscar Torrez MD;  Location:  [facility]    PHACOEMULSIFICATION CLEAR CORNEA WITH STANDARD INTRAOCULAR LENS IMPLANT  2012    Procedure: PHACOEMULSIFICATION CLEAR CORNEA WITH STANDARD INTRAOCULAR LENS IMPLANT;  LEFT  PHACOEMULSIFICATION CLEAR CORNEA WITH STANDARD INTRAOCULAR LENS IMPLANT ;  Surgeon: Óscar Torrez MD;  Location: [facility]    SURGICAL HISTORY OF -       removal of skin cancer    ZZC EXPLORATORY OF ABDOMEN  1980    Laparotomy, thought she had ovarian cyst, but nothing was found      Social History     Tobacco Use    Smoking status: Former     Current packs/day: 0.00     Average packs/day: 0.5 packs/day for 10.0 years (5.0 ttl pk-yrs)     Types: Cigarettes     Start date: 1993     Quit date: 2003     Years since quittin.3    Smokeless tobacco: Never   Substance Use Topics    Alcohol use: Yes     Comment: holidays      Problem (# of Occurrences) Relation (Name,Age of Onset)    Cancer (2) Father (Lencho, Med): lung, Mother (Leonora)    Diabetes  (1) Father (Lencho): adult    Heart Disease (3) Father (Lencho), Mother (Leonora), Paternal Uncle: 2 uncles MI    Hypertension (1) Mother (Leonora)    Neurologic Disorder (1) Father (Lencho): parkinsons    Thyroid Disease (3) Mother (Leonora), Sister (Dinora), Sister (Merrill)    Breast Cancer (1) Mother (Leonora)    C.A.D. (1) Father (Lencho)    Lung Cancer (1) Mother (Leonora)           Negative family history of: Colon Cancer              Current Outpatient Medications   Medication Sig Dispense Refill    atorvastatin (LIPITOR) 10 MG tablet Take 1 tablet (10 mg) by mouth daily 90 tablet 4    fluticasone (FLONASE) 50 MCG/ACT nasal spray Spray 2 sprays into both nostrils daily 1 Package 2    ibuprofen (ADVIL/MOTRIN) 600 MG tablet Take 600mg of ibuprofen every 6 hours x 7 days to assist in pain control.  If you are not tolerating the medication, you are ok to stop. 28 tablet 0    levothyroxine (SYNTHROID/LEVOTHROID) 100 MCG tablet Take 1 tablet (100 mcg) by mouth daily 90 tablet 3    Multiple Vitamins-Minerals (MULTIVITAMIN WOMEN 50+) TABS Take 1 tablet by mouth daily      Naproxen Sodium (ALEVE PO)       phentermine (ADIPEX-P) 37.5 MG tablet Take 1 tablet (37.5 mg) by mouth every morning (before breakfast) 30 tablet 2    vitamin D3 (CHOLECALCIFEROL) 50 mcg (2000 units) tablet Take 1 tablet (50 mcg) by mouth daily 90 tablet 0     No current facility-administered medications for this visit.     No Known Allergies    ROS:  Negative other than as noted in HPI.    OBJECTIVE:     Exam:  Constitutional:  Appearance: Well nourished, well developed alert, in no acute distress  Neurologic/Psychiatric:  Mental Status:  Oriented X3       In-Clinic Test Results:  No results found for this or any previous visit (from the past 24 hour(s)).    ASSESSMENT/PLAN:                                                        ICD-10-CM    1. Postmenopausal bleeding  N95.0 Ob/Gyn  Referral          Reviewed the evaluation and potential  treatment options for postmenopausal bleeding. Discussed that any bleeding after menopause is abnormal, and that evaluation is to ensure that this is not from cancer or precancer of the lining of the uterus. Benign causes, such as endometrial polyps, are usually treated with hysteroscopy and dilation and curettage in the OR. Therefore, risks, benefits, and alternatives to this procedure were also discussed today.    Given her history, the plan was made to proceed directly with hysteroscopy, dilation and curettage and possible polypectomy in the operating room under anesthesia.    25 minutes spent by me on the date of the encounter doing chart review, review of test results, interpretation of tests, patient visit, and documentation     Yolette Agrawal MD  [facility]

## 2024-08-05 ENCOUNTER — OFFICE VISIT (OUTPATIENT)
Dept: PEDIATRICS | Facility: CLINIC | Age: 69
End: 2024-08-05
Payer: MEDICARE

## 2024-08-05 VITALS
SYSTOLIC BLOOD PRESSURE: 121 MMHG | WEIGHT: 191.2 LBS | RESPIRATION RATE: 20 BRPM | BODY MASS INDEX: 35.19 KG/M2 | TEMPERATURE: 97.5 F | DIASTOLIC BLOOD PRESSURE: 85 MMHG | HEIGHT: 62 IN | OXYGEN SATURATION: 97 % | HEART RATE: 92 BPM

## 2024-08-05 DIAGNOSIS — N95.0 PMB (POSTMENOPAUSAL BLEEDING): ICD-10-CM

## 2024-08-05 DIAGNOSIS — Z01.818 PRE-OP EXAM: Primary | ICD-10-CM

## 2024-08-05 DIAGNOSIS — R93.89 THICKENED ENDOMETRIUM: ICD-10-CM

## 2024-08-05 PROCEDURE — 99214 OFFICE O/P EST MOD 30 MIN: CPT | Performed by: PHYSICIAN ASSISTANT

## 2024-08-05 RX ORDER — MISOPROSTOL 200 UG/1
TABLET ORAL
COMMUNITY
Start: 2024-08-02

## 2024-08-05 NOTE — PATIENT INSTRUCTIONS
May take levothyroxine and lipitor morning of surgery  Cytotec night prior to surgery  Tylenol if need pain medication

## 2024-08-05 NOTE — PROGRESS NOTES
Preoperative Evaluation  [facility]  [address]  [address]  [place] [address]  Phone: [phone]  Fax: [phone]  Primary Provider: LENARD Higuera CNP  Pre-op Performing Provider: Josiane Rosen PA-C  Aug 5, 2024       8/4/2024   Surgical Information   What procedure is being done? Hysteroscopy, polypectomy, dilation and curettage with Myosure    Facility or Hospital where procedure/surgery will be performed: [facility]   Who is doing the procedure / surgery? Dr. Dominique Agrawal   Date of surgery / procedure: Aug. 8, 2024   Time of surgery / procedure: 7:15 a.m.   Where do you plan to recover after surgery? at home with family        Fax number for surgical facility: [phone]    Assessment & Plan     The proposed surgical procedure is considered INTERMEDIATE risk.    Pre-op exam    Thickened endometrium    PMB (postmenopausal bleeding)     - No identified additional risk factors other than previously addressed    May take levothyroxine and lipitor morning of surgery. Cytotec the night prior. Hold all other meds and vitamins.    Recommendation  Approval given to proceed with proposed procedure, without further diagnostic evaluation.    Ludy Schmitt is a 68 year old, presenting for the following:  Pre-Op Exam    HPI related to upcoming procedure:  postmenopausal bleeding, thickened endometrial stripe on ultrasound         8/4/2024   Pre-Op Questionnaire   Have you ever had a heart attack or stroke? No   Have you ever had surgery on your heart or blood vessels, such as a stent placement, a coronary artery bypass, or surgery on an artery in your head, neck, heart, or legs? No   Do you have chest pain with activity? No   Do you have a history of heart failure? No   Do you currently have a cold, bronchitis or symptoms of other infection? No   Do you have a cough, shortness of breath, or wheezing? No   Do you or anyone in  your family have previous history of blood clots? None that we are aware of   Do you or does anyone in your family have a serious bleeding problem such as prolonged bleeding following surgeries or cuts? No   Have you ever had problems with anemia or been told to take iron pills? (!) YES 10 yrs ago, had infusions    Have you had any abnormal blood loss such as black, tarry or bloody stools, or abnormal vaginal bleeding? No   Have you ever had a blood transfusion? No   Are you willing to have a blood transfusion if it is medically needed before, during, or after your surgery? Yes   Have you or any of your relatives ever had problems with anesthesia? No   Do you have sleep apnea, excessive snoring or daytime drowsiness? No   Do you have any artifical heart valves or other implanted medical devices like a pacemaker, defibrillator, or continuous glucose monitor? No   Do you have artificial joints? No   Are you allergic to latex? No      Patient Active Problem List    Diagnosis Date Noted    Secondary erythrocytosis 05/07/2024     Priority: Medium    Elevated hemoglobin (H24) 05/07/2024     Priority: Medium    Balance problems 03/20/2024     Priority: Medium    Morbid obesity (H) 10/06/2022     Priority: Medium    Mild dysplasia of cervix 12/03/2019     Priority: Medium     10/2/17 NIL, +HR HPV, not 16/18. Plan 1 yr co-test  10/9/18 NIL, +HR HPV, not 16/18. Plan colp  10/26/18 [facility] EMB--VIK 1. Plan: co-test in 1 year.   10/24/19 NIL, +HR HPV, not 16/18. Plan [facility]  12/3/19 [facility]: VIK 1, Bx & endocervical polyp: benign. Plan 1 year cotest  10/26/20 ASCUS, +HR HPV, not 16/18. Plan 1 yr co-test  11/22/21 ASCUS, Neg HPV. Plan 1 yr co-test  11/28/22 NIL Pap, Neg HPV. Plan cotest in 3 years.       Class 1 obesity due to excess calories without serious comorbidity with body mass index (BMI) of 33.0 to 33.9 in adult 10/09/2018     Priority: Medium    Gastroesophageal reflux disease without esophagitis 10/02/2017     Priority:  Medium    Cervical high risk HPV (human papillomavirus) test positive 10/02/2017     Priority: Medium             Hypothyroidism, unspecified type 10/14/2016     Priority: Medium    Plantar fasciitis, bilateral 05/15/2015     Priority: Medium    Hiatal hernia 01/13/2015     Priority: Medium    Lumbago 08/08/2012     Priority: Medium    HYPERLIPIDEMIA LDL GOAL <160 10/31/2010     Priority: Medium    COLON POLYPS      Priority: Medium      Past Medical History:   Diagnosis Date    Anemia     Arthritis     Aleve occasionally    Benign neoplasm of colon     Cervical high risk HPV (human papillomavirus) test positive 10/02/2017 & 10/9/2018 & 10/24/2019    10/2/17 NIL, +HR HPV, not 16/18    Closed fracture of distal end of left fibula, unspecified fracture morphology, initial encounter 03/20/2024    Gastro-oesophageal reflux disease     Hiatal hernia     History of blood transfusion     Iron deficiency    Hypertrophy of breast     fibrous left breast- benign    Lumbago     Malignant neoplasm (H)     Mild dysplasia of cervix 12/03/2019    Obese     Pure hypercholesterolemia     Skin cancer     Status post open reduction with internal fixation (ORIF) of fracture of ankle 03/20/2024    Thyroid disease     Hypothyroidism- Levothyroxine     Past Surgical History:   Procedure Laterality Date    BIOPSY OF BREAST, INCISIONAL  2004, 2001    left breast    COLONOSCOPY  10/08/2011    Procedure:COMBINED COLONOSCOPY, SINGLE BIOPSY/POLYPECTOMY BY BIOPSY; COLONOSCOPY; Surgeon:DARIANA RUIZ; Location: GI    COLONOSCOPY N/A 10/13/2015    Procedure: COLONOSCOPY;  Surgeon: Toi Sotelo MD;  Location:  GI    COLONOSCOPY Left 07/13/2020    Procedure: COLONOSCOPY;  Surgeon: Toi Sotelo MD;  Location:  GI    DILATION AND CURETTAGE, OPERATIVE HYSTEROSCOPY WITH MORCELLATOR, COMBINED N/A 08/09/2022    Procedure: Hystersocopy, dilation and curettage using morcellator under abdominal ultrasound guidance.;  Surgeon: Oskar  Linda Waite MD;  Location:  OR    ESOPHAGOSCOPY, GASTROSCOPY, DUODENOSCOPY (EGD), COMBINED Left 07/13/2020    Procedure: ESOPHAGOGASTRODUODENOSCOPY, WITH BIOPSies using biopsy forcep;  Surgeon: Toi Sotelo MD;  Location:  GI    OPEN REDUCTION INTERNAL FIXATION ANKLE Left 1/9/2024    Procedure: OPEN REDUCTION INTERNAL FIXATION LEFT ANKLE FRACTURE;  Surgeon: Doron Michelle DPM;  Location:  OR    PHACOEMULSIFICATION CLEAR CORNEA WITH STANDARD INTRAOCULAR LENS IMPLANT  10/04/2012    Procedure: PHACOEMULSIFICATION CLEAR CORNEA WITH STANDARD INTRAOCULAR LENS IMPLANT;  RIGHT PHACOEMULSIFICATION CLEAR CORNEA WITH STANDARD INTRAOCULAR LENS IMPLANT;  Surgeon: Óscar Torrez MD;  Location:  [facility]    PHACOEMULSIFICATION CLEAR CORNEA WITH STANDARD INTRAOCULAR LENS IMPLANT  11/01/2012    Procedure: PHACOEMULSIFICATION CLEAR CORNEA WITH STANDARD INTRAOCULAR LENS IMPLANT;  LEFT  PHACOEMULSIFICATION CLEAR CORNEA WITH STANDARD INTRAOCULAR LENS IMPLANT ;  Surgeon: Óscar Torrez MD;  Location: [facility]    SURGICAL HISTORY OF -       removal of skin cancer    Z EXPLORATORY OF ABDOMEN  1980    Laparotomy, thought she had ovarian cyst, but nothing was found     Current Outpatient Medications   Medication Sig Dispense Refill    atorvastatin (LIPITOR) 10 MG tablet Take 1 tablet (10 mg) by mouth daily 90 tablet 4    fluticasone (FLONASE) 50 MCG/ACT nasal spray Spray 2 sprays into both nostrils daily 1 Package 2    ibuprofen (ADVIL/MOTRIN) 600 MG tablet Take 600mg of ibuprofen every 6 hours x 7 days to assist in pain control.  If you are not tolerating the medication, you are ok to stop. 28 tablet 0    levothyroxine (SYNTHROID/LEVOTHROID) 100 MCG tablet Take 1 tablet (100 mcg) by mouth daily 90 tablet 3    misoprostol (CYTOTEC) 200 MCG tablet       Multiple Vitamins-Minerals (MULTIVITAMIN WOMEN 50+) TABS Take 1 tablet by mouth daily      Naproxen Sodium (ALEVE PO)       phentermine (ADIPEX-P) 37.5 MG tablet Take 1  "tablet (37.5 mg) by mouth every morning (before breakfast) 30 tablet 2    vitamin D3 (CHOLECALCIFEROL) 50 mcg (2000 units) tablet Take 1 tablet (50 mcg) by mouth daily 90 tablet 0       No Known Allergies     Social History     Tobacco Use    Smoking status: Former     Current packs/day: 0.00     Average packs/day: 0.5 packs/day for 10.0 years (5.0 ttl pk-yrs)     Types: Cigarettes     Start date: 1993     Quit date: 2003     Years since quittin.3     Passive exposure: Past    Smokeless tobacco: Never   Substance Use Topics    Alcohol use: Not Currently     Comment: holidays     History   Drug Use Unknown         Review of Systems  Constitutional, neuro, ENT, endocrine, pulmonary, cardiac, gastrointestinal, genitourinary, musculoskeletal, integument and psychiatric systems are negative, except as otherwise noted.    Objective    /85 (BP Location: Right arm, Patient Position: Sitting, Cuff Size: Adult Large)   Pulse 92   Temp 97.5  F (36.4  C) (Temporal)   Resp 20   Ht 1.57 m (5' 1.81\")   Wt 86.7 kg (191 lb 3.2 oz)   LMP 2008   SpO2 97%   BMI 35.19 kg/m     Estimated body mass index is 35.19 kg/m  as calculated from the following:    Height as of this encounter: 1.57 m (5' 1.81\").    Weight as of this encounter: 86.7 kg (191 lb 3.2 oz).  Physical Exam  GENERAL: alert and no distress  EYES: Eyes grossly normal to inspection, PERRL and conjunctivae and sclerae normal  HENT: ear canals and TM's normal, nose and mouth without ulcers or lesions  NECK: no adenopathy  RESP: lungs clear to auscultation - no rales, rhonchi or wheezes  CV: regular rate and rhythm, normal S1 S2, no S3 or S4, no murmur, click or rub, no peripheral edema  ABDOMEN: soft, nontender, no hepatosplenomegaly, no masses and bowel sounds normal  MS: no gross musculoskeletal defects noted, no edema    Recent Labs   Lab Test 24  1030 23  1502   HGB 15.4 16.1*    299    140   POTASSIUM 4.6 4.2   CR " 0.74 0.74   A1C 5.8*  --         Diagnostics  No labs were ordered during this visit.   No EKG required, no history of coronary heart disease, significant arrhythmia, peripheral arterial disease or other structural heart disease.    Revised Cardiac Risk Index (RCRI)  The patient has the following serious cardiovascular risks for perioperative complications:   - No serious cardiac risks = 0 points        Signed Electronically by: Josiane Rosen PA-C  A copy of this evaluation report is provided to the requesting physician.

## 2024-08-08 ENCOUNTER — LAB REQUISITION (OUTPATIENT)
Dept: LAB | Facility: CLINIC | Age: 69
End: 2024-08-08
Payer: MEDICARE

## 2024-08-08 ENCOUNTER — HOSPITAL (OUTPATIENT)
Dept: OBGYN | Facility: CLINIC | Age: 69
End: 2024-08-08
Payer: MEDICARE

## 2024-08-08 DIAGNOSIS — N95.0 POSTMENOPAUSAL BLEEDING: ICD-10-CM

## 2024-08-08 PROCEDURE — 88305 TISSUE EXAM BY PATHOLOGIST: CPT | Mod: TC,ORL | Performed by: OBSTETRICS & GYNECOLOGY

## 2024-08-08 NOTE — PROGRESS NOTES
HYSTEROSCOPY, D&C, POLYPECTOMY OPERATIVE NOTE    Preoperative Diagnosis: postmenopausal bleeding   Postoperative Diagnosis: same plus endometrial polyps  Procedure(s): EUA, Hysteroscopy, D&C, POLYPECTOMY WITH MORCELLATOR  Surgeon: Yolette Agrawal MD   Type of anesthesia:  general  Complications: None  EBL:  3 cc  Findings: multiple (6+) endometrial polyps  Specimen(s) removed: endometrial polyps and curettings    Indications:   Postmenopausal bleeding, inability to sample in clinic.  Risks, benefits and alternative treatments have been discussed with the patient. Informed consent obtained.     Procedure:   The patient was taken to the operating room where general anesthesia was administered. She was prepared and draped in the normal sterile fashion in the dorsal lithotomy position. EUA peformed showed normal uterus, negative adnexa. A bivalve speculum was inserted in the vagina. A single tooth tenaculum was used to grasp the anterior lip of the cervix. The Hegar dilators were used to dilate the cervix to 6 mm. The hysteroscope was inserted and the uterine cavity explored. The above findings were noted.  Hysteroscope then removed and Myosure inserted. The polyps were completely resected under direct visualization. Visible endometrial tissue resected with the Myosure, then sharp curettage was productive of minimal further tissue. Specimen sent to pathology. All instruments were then removed. Tenaculum sites appeared to be hemostatic with pressure and silver nitrate. The patient tolerated the procedure well and was transferred in stable condition to the PACU.     Yolette Agrawal MD  August 8, 2024  8:45 AM

## 2024-08-11 PROCEDURE — 88305 TISSUE EXAM BY PATHOLOGIST: CPT | Mod: 26 | Performed by: PATHOLOGY

## 2024-08-12 ENCOUNTER — MYC MEDICAL ADVICE (OUTPATIENT)
Dept: PEDIATRICS | Facility: CLINIC | Age: 69
End: 2024-08-12
Payer: MEDICARE

## 2024-08-15 ENCOUNTER — TRANSFERRED RECORDS (OUTPATIENT)
Dept: HEALTH INFORMATION MANAGEMENT | Facility: CLINIC | Age: 69
End: 2024-08-15
Payer: MEDICARE

## 2024-08-16 ENCOUNTER — OFFICE VISIT (OUTPATIENT)
Dept: OBGYN | Facility: CLINIC | Age: 69
End: 2024-08-16
Payer: MEDICARE

## 2024-08-16 VITALS — DIASTOLIC BLOOD PRESSURE: 72 MMHG | SYSTOLIC BLOOD PRESSURE: 120 MMHG

## 2024-08-16 DIAGNOSIS — N84.0 ENDOMETRIAL POLYP: Primary | ICD-10-CM

## 2024-08-16 PROCEDURE — 99212 OFFICE O/P EST SF 10 MIN: CPT | Performed by: OBSTETRICS & GYNECOLOGY

## 2024-08-16 PROCEDURE — G2211 COMPLEX E/M VISIT ADD ON: HCPCS | Performed by: OBSTETRICS & GYNECOLOGY

## 2024-08-16 NOTE — PROGRESS NOTES
SUBJECTIVE:                                                   Vanessa Mckeon is a 68 year old female who presents to clinic today for the following health issue(s):  Postoperative visit    HPI:  She is status post hysteroscopy and polypectomy. Reports light bleeding after the procedure, no fevers, chills, or other concerns.   Pathology revealed benign endometrial polyps.      Problem list and histories reviewed & adjusted, as indicated.  Additional history: as documented.    Patient Active Problem List   Diagnosis    COLON POLYPS    HYPERLIPIDEMIA LDL GOAL <160    Lumbago    Hiatal hernia    Plantar fasciitis, bilateral    Hypothyroidism, unspecified type    Gastroesophageal reflux disease without esophagitis    Cervical high risk HPV (human papillomavirus) test positive    Class 1 obesity due to excess calories without serious comorbidity with body mass index (BMI) of 33.0 to 33.9 in adult    Mild dysplasia of cervix    Morbid obesity (H)    Balance problems    Secondary erythrocytosis    Elevated hemoglobin (H24)     Past Surgical History:   Procedure Laterality Date    BIOPSY OF BREAST, INCISIONAL  2004, 2001    left breast    COLONOSCOPY  10/08/2011    Procedure:COMBINED COLONOSCOPY, SINGLE BIOPSY/POLYPECTOMY BY BIOPSY; COLONOSCOPY; Surgeon:DARIANA RUIZ; Location: GI    COLONOSCOPY N/A 10/13/2015    Procedure: COLONOSCOPY;  Surgeon: Toi Sotelo MD;  Location:  GI    COLONOSCOPY Left 07/13/2020    Procedure: COLONOSCOPY;  Surgeon: Toi Sotelo MD;  Location:  GI    DILATION AND CURETTAGE, OPERATIVE HYSTEROSCOPY WITH MORCELLATOR, COMBINED N/A 08/09/2022    Procedure: Hystersocopy, dilation and curettage using morcellator under abdominal ultrasound guidance.;  Surgeon: Linda Schmitt MD;  Location:  OR    ESOPHAGOSCOPY, GASTROSCOPY, DUODENOSCOPY (EGD), COMBINED Left 07/13/2020    Procedure: ESOPHAGOGASTRODUODENOSCOPY, WITH BIOPSies using biopsy forcep;  Surgeon: Deepak  Toi LOZADA MD;  Location:  GI    OPEN REDUCTION INTERNAL FIXATION ANKLE Left 2024    Procedure: OPEN REDUCTION INTERNAL FIXATION LEFT ANKLE FRACTURE;  Surgeon: Doron Michelle DPM;  Location:  OR    PHACOEMULSIFICATION CLEAR CORNEA WITH STANDARD INTRAOCULAR LENS IMPLANT  10/04/2012    Procedure: PHACOEMULSIFICATION CLEAR CORNEA WITH STANDARD INTRAOCULAR LENS IMPLANT;  RIGHT PHACOEMULSIFICATION CLEAR CORNEA WITH STANDARD INTRAOCULAR LENS IMPLANT;  Surgeon: Óscar Torrez MD;  Location:  [facility]    PHACOEMULSIFICATION CLEAR CORNEA WITH STANDARD INTRAOCULAR LENS IMPLANT  2012    Procedure: PHACOEMULSIFICATION CLEAR CORNEA WITH STANDARD INTRAOCULAR LENS IMPLANT;  LEFT  PHACOEMULSIFICATION CLEAR CORNEA WITH STANDARD INTRAOCULAR LENS IMPLANT ;  Surgeon: Óscar Torrez MD;  Location: [facility]    SURGICAL HISTORY OF -       removal of skin cancer    ZZC EXPLORATORY OF ABDOMEN  1980    Laparotomy, thought she had ovarian cyst, but nothing was found      Social History     Tobacco Use    Smoking status: Former     Current packs/day: 0.00     Average packs/day: 0.5 packs/day for 10.0 years (5.0 ttl pk-yrs)     Types: Cigarettes     Start date: 1993     Quit date: 2003     Years since quittin.3     Passive exposure: Past    Smokeless tobacco: Never   Substance Use Topics    Alcohol use: Not Currently     Comment: holidays      Problem (# of Occurrences) Relation (Name,Age of Onset)    Cancer (2) Father (Lencho, 95): lung, Mother (Leonora)    Diabetes (1) Father (Lencho): adult    Heart Disease (3) Father (Lencho), Mother (Leonora), Paternal Uncle: 2 uncles MI    Hypertension (1) Mother (Leonora)    Neurologic Disorder (1) Father (Lencho): parkinsons    Thyroid Disease (3) Mother (Leonora), Sister (Dinora), Sister (Merrill)    Breast Cancer (1) Mother (Leonora)    C.A.D. (1) Father (Lencho)    Lung Cancer (1) Mother (Leonora)           Negative family history of: Colon Cancer              Current  Outpatient Medications   Medication Sig Dispense Refill    atorvastatin (LIPITOR) 10 MG tablet Take 1 tablet (10 mg) by mouth daily 90 tablet 4    fluticasone (FLONASE) 50 MCG/ACT nasal spray Spray 2 sprays into both nostrils daily 1 Package 2    ibuprofen (ADVIL/MOTRIN) 600 MG tablet Take 600mg of ibuprofen every 6 hours x 7 days to assist in pain control.  If you are not tolerating the medication, you are ok to stop. 28 tablet 0    levothyroxine (SYNTHROID/LEVOTHROID) 100 MCG tablet Take 1 tablet (100 mcg) by mouth daily 90 tablet 3    misoprostol (CYTOTEC) 200 MCG tablet       Multiple Vitamins-Minerals (MULTIVITAMIN WOMEN 50+) TABS Take 1 tablet by mouth daily      Naproxen Sodium (ALEVE PO)       phentermine (ADIPEX-P) 37.5 MG tablet Take 1 tablet (37.5 mg) by mouth every morning (before breakfast) 30 tablet 2    vitamin D3 (CHOLECALCIFEROL) 50 mcg (2000 units) tablet Take 1 tablet (50 mcg) by mouth daily 90 tablet 0     No current facility-administered medications for this visit.     No Known Allergies      OBJECTIVE:     LMP 05/19/2008    BMI: There is no height or weight on file to calculate BMI.  General: Alert and oriented, no distress.  Psychiatric: Mood and affect within normal limits.    In-Clinic Test Results:  No results found for this or any previous visit (from the past 24 hour(s)).    ASSESSMENT/PLAN:                                                      Postoperative exam  Endometrial polyps  Pathology reviewed.  Follow up as needed for routine care.  No further restrictions needed.    Yolette Agrawal MD  [facility] [place]

## 2024-09-27 ENCOUNTER — VIRTUAL VISIT (OUTPATIENT)
Dept: PEDIATRICS | Facility: CLINIC | Age: 69
End: 2024-09-27
Payer: MEDICARE

## 2024-09-27 DIAGNOSIS — E66.811 CLASS 1 OBESITY DUE TO EXCESS CALORIES WITHOUT SERIOUS COMORBIDITY WITH BODY MASS INDEX (BMI) OF 33.0 TO 33.9 IN ADULT: ICD-10-CM

## 2024-09-27 DIAGNOSIS — E03.9 HYPOTHYROIDISM, UNSPECIFIED TYPE: ICD-10-CM

## 2024-09-27 DIAGNOSIS — R79.9 ABNORMAL FINDING OF BLOOD CHEMISTRY, UNSPECIFIED: ICD-10-CM

## 2024-09-27 DIAGNOSIS — E66.09 CLASS 1 OBESITY DUE TO EXCESS CALORIES WITHOUT SERIOUS COMORBIDITY WITH BODY MASS INDEX (BMI) OF 33.0 TO 33.9 IN ADULT: ICD-10-CM

## 2024-09-27 DIAGNOSIS — E66.01 MORBID OBESITY (H): Primary | ICD-10-CM

## 2024-09-27 PROCEDURE — 99441 PR PHYSICIAN TELEPHONE EVALUATION 5-10 MIN: CPT | Mod: 93 | Performed by: NURSE PRACTITIONER

## 2024-09-27 RX ORDER — PHENTERMINE HYDROCHLORIDE 37.5 MG/1
37.5 TABLET ORAL
Qty: 30 TABLET | Refills: 2 | Status: SHIPPED | OUTPATIENT
Start: 2024-09-27

## 2024-09-27 NOTE — PROGRESS NOTES
"Keshia is a 68 year old who is being evaluated via a billable telephone visit.    What phone number would you like to be contacted at? [phone]   How would you like to obtain your AVS? Tala  Originating Location (pt. Location): Home    Distant Location (provider location):  On-site    Assessment & Plan     Morbid obesity (H)  Doing well on current regime, has been workign on portion control, increasing activity. Will continue current meds.   - Hemoglobin A1c; Future    Class 1 obesity due to excess calories without serious comorbidity with body mass index (BMI) of 33.0 to 33.9 in adult  Per above  - phentermine (ADIPEX-P) 37.5 MG tablet; Take 1 tablet (37.5 mg) by mouth every morning (before breakfast).    Hypothyroidism, unspecified type  Last lab was very slightly abnormal, no specific symptoms of concern, will repeat tsh with next blood  - TSH with free T4 reflex; Future    Abnormal finding of blood chemistry, unspecified  Last A1c borderline high, will repeat  - Hemoglobin A1c; Future    The longitudinal plan of care for the diagnosis(es)/condition(s) as documented were addressed during this visit. Due to the added complexity in care, I will continue to support Keshia in the subsequent management and with ongoing continuity of care.      BMI  Estimated body mass index is 35.19 kg/m  as calculated from the following:    Height as of 8/5/24: 1.57 m (5' 1.81\").    Weight as of 8/5/24: 86.7 kg (191 lb 3.2 oz).             Subjective   Keshia is a 68 year old, presenting for the following health issues:  No chief complaint on file.        9/27/2024     9:29 AM   Additional Questions   Roomed by Laura     History of Present Illness       Reason for visit:  To discuss my diet and medication for diet    She eats 2-3 servings of fruits and vegetables daily.She consumes 0 sweetened beverage(s) daily.She exercises with enough effort to increase her heart rate 30 to 60 minutes per day.  She exercises with enough effort " "to increase her heart rate 7 days per week.   She is taking medications regularly.       Medication Followup of phentermine  Taking Medication as prescribed: yes  Side Effects:  None  Medication Helping Symptoms:  yes      Wt Readings from Last 4 Encounters:   08/05/24 86.7 kg (191 lb 3.2 oz)   08/02/24 86.2 kg (190 lb)   06/18/24 89.4 kg (197 lb)   05/07/24 93 kg (205 lb)     Has lost 26# so far with phentermine, no side effects of concern.           Objective    Vitals - Patient Reported  Weight (Patient Reported): 81.2 kg (179 lb)  Height (Patient Reported): 157.5 cm (5' 2\")  BMI (Based on Pt Reported Ht/Wt): 32.74      Vitals:  No vitals were obtained today due to virtual visit.    Physical Exam   General: Alert and no distress //Respiratory: No audible wheeze, cough, or shortness of breath // Psychiatric:  Appropriate affect, tone, and pace of words            Phone call duration: 6 minutes  Signed Electronically by: LENARD Higuera CNP    "

## 2024-10-04 ENCOUNTER — LAB (OUTPATIENT)
Dept: LAB | Facility: CLINIC | Age: 69
End: 2024-10-04
Attending: NURSE PRACTITIONER
Payer: MEDICARE

## 2024-10-04 DIAGNOSIS — E66.01 MORBID OBESITY (H): ICD-10-CM

## 2024-10-04 DIAGNOSIS — E03.9 HYPOTHYROIDISM, UNSPECIFIED TYPE: ICD-10-CM

## 2024-10-04 DIAGNOSIS — R79.9 ABNORMAL FINDING OF BLOOD CHEMISTRY, UNSPECIFIED: ICD-10-CM

## 2024-10-04 LAB
EST. AVERAGE GLUCOSE BLD GHB EST-MCNC: 123 MG/DL
HBA1C MFR BLD: 5.9 % (ref 0–5.6)
TSH SERPL DL<=0.005 MIU/L-ACNC: 3.27 UIU/ML (ref 0.3–4.2)

## 2024-10-04 PROCEDURE — 83036 HEMOGLOBIN GLYCOSYLATED A1C: CPT

## 2024-10-04 PROCEDURE — 84443 ASSAY THYROID STIM HORMONE: CPT

## 2024-10-04 PROCEDURE — 36415 COLL VENOUS BLD VENIPUNCTURE: CPT

## 2024-10-06 ASSESSMENT — SLEEP AND FATIGUE QUESTIONNAIRES
HOW LIKELY ARE YOU TO NOD OFF OR FALL ASLEEP WHILE SITTING AND TALKING TO SOMEONE: WOULD NEVER DOZE
HOW LIKELY ARE YOU TO NOD OFF OR FALL ASLEEP WHILE WATCHING TV: SLIGHT CHANCE OF DOZING
HOW LIKELY ARE YOU TO NOD OFF OR FALL ASLEEP WHEN YOU ARE A PASSENGER IN A CAR FOR AN HOUR WITHOUT A BREAK: WOULD NEVER DOZE
HOW LIKELY ARE YOU TO NOD OFF OR FALL ASLEEP WHILE LYING DOWN TO REST IN THE AFTERNOON WHEN CIRCUMSTANCES PERMIT: SLIGHT CHANCE OF DOZING
HOW LIKELY ARE YOU TO NOD OFF OR FALL ASLEEP WHILE SITTING INACTIVE IN A PUBLIC PLACE: WOULD NEVER DOZE
HOW LIKELY ARE YOU TO NOD OFF OR FALL ASLEEP WHILE SITTING QUIETLY AFTER LUNCH WITHOUT ALCOHOL: WOULD NEVER DOZE
HOW LIKELY ARE YOU TO NOD OFF OR FALL ASLEEP IN A CAR, WHILE STOPPED FOR A FEW MINUTES IN TRAFFIC: WOULD NEVER DOZE
HOW LIKELY ARE YOU TO NOD OFF OR FALL ASLEEP WHILE SITTING AND READING: WOULD NEVER DOZE

## 2024-10-11 ENCOUNTER — OFFICE VISIT (OUTPATIENT)
Dept: SLEEP MEDICINE | Facility: CLINIC | Age: 69
End: 2024-10-11
Attending: NURSE PRACTITIONER
Payer: MEDICARE

## 2024-10-11 VITALS
HEIGHT: 62 IN | OXYGEN SATURATION: 97 % | BODY MASS INDEX: 33.55 KG/M2 | SYSTOLIC BLOOD PRESSURE: 128 MMHG | WEIGHT: 182.3 LBS | DIASTOLIC BLOOD PRESSURE: 85 MMHG | RESPIRATION RATE: 16 BRPM | HEART RATE: 86 BPM

## 2024-10-11 DIAGNOSIS — D75.1 SECONDARY ERYTHROCYTOSIS: ICD-10-CM

## 2024-10-11 DIAGNOSIS — E66.811 CLASS 1 OBESITY DUE TO EXCESS CALORIES WITHOUT SERIOUS COMORBIDITY WITH BODY MASS INDEX (BMI) OF 33.0 TO 33.9 IN ADULT: ICD-10-CM

## 2024-10-11 DIAGNOSIS — R06.83 SNORING: Primary | ICD-10-CM

## 2024-10-11 DIAGNOSIS — E66.09 CLASS 1 OBESITY DUE TO EXCESS CALORIES WITHOUT SERIOUS COMORBIDITY WITH BODY MASS INDEX (BMI) OF 33.0 TO 33.9 IN ADULT: ICD-10-CM

## 2024-10-11 PROBLEM — R26.89 BALANCE PROBLEMS: Status: RESOLVED | Noted: 2024-03-20 | Resolved: 2024-10-11

## 2024-10-11 PROCEDURE — 99204 OFFICE O/P NEW MOD 45 MIN: CPT | Performed by: PHYSICIAN ASSISTANT

## 2024-10-11 NOTE — NURSING NOTE
"Chief Complaint   Patient presents with    Sleep Problem     Patient presents to clinic for a consultation on sleep apnea.       Initial /85   Pulse 86   Resp 16   Ht 1.562 m (5' 1.5\")   Wt 82.7 kg (182 lb 4.8 oz)   LMP 05/19/2008   SpO2 97%   BMI 33.89 kg/m   Estimated body mass index is 33.89 kg/m  as calculated from the following:    Height as of this encounter: 1.562 m (5' 1.5\").    Weight as of this encounter: 82.7 kg (182 lb 4.8 oz).    Medication Reconciliation: complete  ESS: 2  Neck circumference: 15 inches / 38 centimeters.  DME: N/A  Tracy Wang CMA      "

## 2024-10-11 NOTE — PROGRESS NOTES
Outpatient Sleep Medicine Consultation:    Name: Vanessa Mckeon MRN# [id number]   Age: 69 year old YOB: 1955     Date of Consultation: October 11, 2024  Consultation is requested by: LENARD Soria CNP  [address] [place] [address] Tatiana Leahy  Primary care provider: Tatiana Leahy       Reason for Sleep Consult:     Vanessa Mckeon is sent by Tatiana Leahy for a sleep consultation regarding 1. Secondary erythrocytosis    Patient s Reason for visit  Vanessa Mckeon main reason for visit: primary wanted me checked-  Patient states problem(s) started: i dont believe I have it..no one in my house has noticed an issue  Vanessa Mckeon's goals for this visit:           Assessment and Plan:     Summary Sleep Diagnoses:  1. Snoring  Comorbid Diagnoses:  2. Secondary erythrocytosis  3. Class 1 obesity due to excess calories without serious comorbidity with body mass index (BMI) of 33.0 to 33.9 in adult    Patient presents to clinic today for evaluation of suspected sleep apnea in the context of erythrocytosis.  Erythrocytosis thought to be from possible underlying LITTLE and associated hypoxemia from this condition.  Patient does not believe that she has sleep apnea and has zero concerns with her sleep.  Her  notes that she can snore but it is not loud or disruptive.  No witnessed apneic events.  Denies any gasping or choking arousals.  No insomnia.  No daytime sleepiness.  She wakes once per night to use the restroom. No abnormal movements or behaviors in sleep. Discussed with her that I agree it does appear that she is overall a really good sleeper but underlying LITTLE can be hard to identify on symptoms alone. We spent some time today reviewing pathophysiology of snoring versus LITTLE and how underlying LITTLE can contribute to increased cardiovascular risk.  Discussed pursuing sleep study to evaluate sleep disordered breathing even though she  is not classically very symptomatic. Patient politely declined pursuing testing today given she is largely asymptomatic and the fact that even if we did do a sleep study she would not be interested in treatment at this time.  We discussed CPAP therapy, mandibular advancement device, possible surgical consult as potential treatment options and patient reports the only thing she would be interested in is Inspire therapy which we discussed is not first-line treatment and she would have to be on something like CPAP for at least a couple months and fail this before we could consider that workup.  I asked her to have her  keep a closer eye on her sleeping at night if he is ever awake to see if she is having any apneic events.  If he notes anything abnormal or if she becomes more symptomatic or changes her mind on testing she will return to clinic and we will plan on pursuing sleep study at that time.  For now she will follow-up with us as needed.           History of Present Illness:     Vanessa Mckeon is a 69-year-old female with obesity, hypothyroid, GERD, prediabetes who presents to clinic today for evaluation of suspected sleep apnea.    Past Sleep Evaluations:None    SLEEP-WAKE SCHEDULE:     Work/School Days: Patient goes to school/work: No   Usually gets into bed at 8512-9496 pm  Takes patient about 10-15 minutes to fall asleep  Has trouble falling asleep 0 nights per week  Wakes up in the middle of the night sometimes none-sometimes once to use bathroom   Wakes up due to Use the bathroom  She has trouble falling back asleep 0 times a week.   It usually takes 10 minutes to get back to sleep  Patient is usually up at 730-830 a.m.  Uses alarm: No    Weekends/Non-work Days/All Other Days:  Same as above     Sleep Need  Patient gets 9 hours sleep on average   Patient thinks she needs about 7 hours sleep    Vanessa Mckeon prefers to sleep in this position(s): Side;Stomach     Naps  Patient takes a purposeful  nap 0 times a week  She dozes off unintentionally 0 days per week  Patient has had a driving accident or near-miss due to sleepiness/drowsiness: No      SLEEP DISRUPTIONS:    Breathing/Snoring  Patient snores:Yes  Other people complain about her snoring: No  Patient has been told she stops breathing in her sleep:No  Gasping/choking:No  Denies morning headache, morning nasal congestion, morning dry mouth, frequent nocturia, nocturnal GERD    Movement:  Patient gets pain, discomfort, with an urge to move:  No  It happens when she is resting:  No  It happens more at night:  No  Patient has been told she kicks her legs at night:  No     Behaviors in Sleep:  Denies sleepwalking, sleep talking, sleep eating, bruxism, dream enactment, recurring nightmares, night terrors, sleep paralysis, hypnagogic/hypnopompic hallucinations, cataplexy      CAFFEINE AND OTHER SUBSTANCES:    Patient consumes caffeinated beverages per day:  2 cups coffee a day  Last caffeine use is usually: around noon  List of any prescribed or over the counter stimulants that patient takes:   phentermine  List of any prescribed or over the counter sleep medication patient takes: none  List of previous sleep medications that patient has tried: none  Patient drinks alcohol to help them sleep: No  Patient drinks alcohol near bedtime: No    Family History:  Patient has a family member been diagnosed with a sleep disorder: No      SCALES:    EPWORTH SLEEPINESS SCALE         10/6/2024    11:04 AM    [place] Sleepiness Scale ( SARY Carmen  1532-1978<br>ESS - [place]/English - Final version - 21 Nov 07 - [facility] [place].)   Sitting and reading Would never doze   Watching TV Slight chance of dozing   Sitting, inactive in a public place (e.g. a theatre or a meeting) Would never doze   As a passenger in a car for an hour without a break Would never doze   Lying down to rest in the afternoon when circumstances permit Slight chance of dozing   Sitting and talking to  someone Would never doze   Sitting quietly after a lunch without alcohol Would never doze   In a car, while stopped for a few minutes in traffic Would never doze   [place] Score (MC) 2   [place] Score (Sleep) 2       INSOMNIA SEVERITY INDEX (DANIA)          10/6/2024    10:46 AM   Insomnia Severity Index (DANIA)   Difficulty falling asleep 0   Difficulty staying asleep 0   Problems waking up too early 0   How SATISFIED/DISSATISFIED are you with your CURRENT sleep pattern? 0   How NOTICEABLE to others do you think your sleep problem is in terms of impairing the quality of your life? 0   How WORRIED/DISTRESSED are you about your current sleep problem? 0   To what extent do you consider your sleep problem to INTERFERE with your daily functioning (e.g. daytime fatigue, mood, ability to function at work/daily chores, concentration, memory, mood, etc.) CURRENTLY? 0   DANIA Total Score 0       Guidelines for Scoring/Interpretation:  Total score categories:  0-7 = No clinically significant insomnia   8-14 = Subthreshold insomnia   15-21 = Clinical insomnia (moderate severity)  22-28 = Clinical insomnia (severe)  Used via courtesy of www.[employer].va.gov with permission from Venkat Isabel PhD., [facility]    Allergies:    No Known Allergies    Medications:    Current Outpatient Medications   Medication Sig Dispense Refill    atorvastatin (LIPITOR) 10 MG tablet Take 1 tablet (10 mg) by mouth daily 90 tablet 4    fluticasone (FLONASE) 50 MCG/ACT nasal spray Spray 2 sprays into both nostrils daily 1 Package 2    ibuprofen (ADVIL/MOTRIN) 600 MG tablet Take 600mg of ibuprofen every 6 hours x 7 days to assist in pain control.  If you are not tolerating the medication, you are ok to stop. 28 tablet 0    levothyroxine (SYNTHROID/LEVOTHROID) 100 MCG tablet Take 1 tablet (100 mcg) by mouth daily 90 tablet 3    Multiple Vitamins-Minerals (MULTIVITAMIN WOMEN 50+) TABS Take 1 tablet by mouth daily      Naproxen Sodium (ALEVE PO)        phentermine (ADIPEX-P) 37.5 MG tablet Take 1 tablet (37.5 mg) by mouth every morning (before breakfast). 30 tablet 2       Problem List:  Patient Active Problem List    Diagnosis Date Noted    Secondary erythrocytosis 05/07/2024     Priority: Medium    Elevated hemoglobin (H) 05/07/2024     Priority: Medium    Balance problems 03/20/2024     Priority: Medium    Morbid obesity (H) 10/06/2022     Priority: Medium    Mild dysplasia of cervix 12/03/2019     Priority: Medium     10/2/17 NIL, +HR HPV, not 16/18. Plan 1 yr co-test  10/9/18 NIL, +HR HPV, not 16/18. Plan colp  10/26/18 [place] [facility]--VIK 1. Plan: co-test in 1 year.   10/24/19 NIL, +HR HPV, not 16/18. Plan [place]  12/3/19 [place] [facility]: VIK 1, Bx & endocervical polyp: benign. Plan 1 year cotest  10/26/20 ASCUS, +HR HPV, not 16/18. Plan 1 yr co-test  11/22/21 ASCUS, Neg HPV. Plan 1 yr co-test  11/28/22 NIL Pap, Neg HPV. Plan cotest in 3 years.       Class 1 obesity due to excess calories without serious comorbidity with body mass index (BMI) of 33.0 to 33.9 in adult 10/09/2018     Priority: Medium    Gastroesophageal reflux disease without esophagitis 10/02/2017     Priority: Medium    Cervical high risk HPV (human papillomavirus) test positive 10/02/2017     Priority: Medium             Hypothyroidism, unspecified type 10/14/2016     Priority: Medium    Plantar fasciitis, bilateral 05/15/2015     Priority: Medium    Hiatal hernia 01/13/2015     Priority: Medium    Lumbago 08/08/2012     Priority: Medium    HYPERLIPIDEMIA LDL GOAL <160 10/31/2010     Priority: Medium    COLON POLYPS      Priority: Medium        Past Medical/Surgical History:  Past Medical History:   Diagnosis Date    Anemia     Arthritis     Aleve occasionally    Benign neoplasm of colon     Cervical high risk HPV (human papillomavirus) test positive 10/02/2017 & 10/9/2018 & 10/24/2019    10/2/17 NIL, +HR HPV, not 16/18    Closed fracture of distal end of left fibula, unspecified fracture morphology,  initial encounter 03/20/2024    Gastro-oesophageal reflux disease     Hiatal hernia     History of blood transfusion     Iron deficiency    Hypertrophy of breast     fibrous left breast- benign    Lumbago     Malignant neoplasm (H)     Mild dysplasia of cervix 12/03/2019    Obese     Pure hypercholesterolemia     Skin cancer     Status post open reduction with internal fixation (ORIF) of fracture of ankle 03/20/2024    Thyroid disease     Hypothyroidism- Levothyroxine     Past Surgical History:   Procedure Laterality Date    BIOPSY OF BREAST, INCISIONAL  2004, 2001    left breast    COLONOSCOPY  10/08/2011    Procedure:COMBINED COLONOSCOPY, SINGLE BIOPSY/POLYPECTOMY BY BIOPSY; COLONOSCOPY; Surgeon:DARIANA RUIZ; Location: GI    COLONOSCOPY N/A 10/13/2015    Procedure: COLONOSCOPY;  Surgeon: Toi Sotelo MD;  Location:  GI    COLONOSCOPY Left 07/13/2020    Procedure: COLONOSCOPY;  Surgeon: Toi Sotelo MD;  Location:  GI    DILATION AND CURETTAGE, OPERATIVE HYSTEROSCOPY WITH MORCELLATOR, COMBINED N/A 08/09/2022    Procedure: Hystersocopy, dilation and curettage using morcellator under abdominal ultrasound guidance.;  Surgeon: Linda Schmitt MD;  Location:  OR    ESOPHAGOSCOPY, GASTROSCOPY, DUODENOSCOPY (EGD), COMBINED Left 07/13/2020    Procedure: ESOPHAGOGASTRODUODENOSCOPY, WITH BIOPSies using biopsy forcep;  Surgeon: Toi Sotelo MD;  Location:  GI    OPEN REDUCTION INTERNAL FIXATION ANKLE Left 1/9/2024    Procedure: OPEN REDUCTION INTERNAL FIXATION LEFT ANKLE FRACTURE;  Surgeon: Doron Michelle DPM;  Location:  OR    PHACOEMULSIFICATION CLEAR CORNEA WITH STANDARD INTRAOCULAR LENS IMPLANT  10/04/2012    Procedure: PHACOEMULSIFICATION CLEAR CORNEA WITH STANDARD INTRAOCULAR LENS IMPLANT;  RIGHT PHACOEMULSIFICATION CLEAR CORNEA WITH STANDARD INTRAOCULAR LENS IMPLANT;  Surgeon: Óscar Torrez MD;  Location:  [facility]    PHACOEMULSIFICATION CLEAR CORNEA WITH STANDARD  INTRAOCULAR LENS IMPLANT  2012    Procedure: PHACOEMULSIFICATION CLEAR CORNEA WITH STANDARD INTRAOCULAR LENS IMPLANT;  LEFT  PHACOEMULSIFICATION CLEAR CORNEA WITH STANDARD INTRAOCULAR LENS IMPLANT ;  Surgeon: Óscar Torrez MD;  Location: [facility]    SURGICAL HISTORY OF -       removal of skin cancer    ZZC EXPLORATORY OF ABDOMEN  1980    Laparotomy, thought she had ovarian cyst, but nothing was found       Social History:  Social History     Socioeconomic History    Marital status:      Spouse name: Not on file    Number of children: Not on file    Years of education: Not on file    Highest education level: 12th grade   Occupational History     Employer: [employer]     Comment: office   Tobacco Use    Smoking status: Former     Current packs/day: 0.00     Average packs/day: 0.5 packs/day for 10.0 years (5.0 ttl pk-yrs)     Types: Cigarettes     Start date: 1993     Quit date: 2003     Years since quittin.5     Passive exposure: Past    Smokeless tobacco: Never   Vaping Use    Vaping status: Never Used   Substance and Sexual Activity    Alcohol use: Not Currently     Comment: holidays    Drug use: Never    Sexual activity: Not Currently     Partners: Male     Birth control/protection: Abstinence   Other Topics Concern    Parent/sibling w/ CABG, MI or angioplasty before 65F 55M? No   Social History Narrative    Dairy/d 3-4 servings/d    Caffeine 5 servings/d    Exercise 4-5 x week    Sunscreen used - Yes    Seatbelts used - Yes    Working smoke/CO detectors in the home - Yes    Guns stored in the home - No    Self Breast Exams - Yes    Self Testicular Exam - NOT APPLICABLE    Eye Exam up to date - no,will make appt    Dental Exam up to date - Yes-this past Sat    Pap Smear up to date - no    Mammogram up to date - 2009,new ins,will be getting another mammo soon    PSA up to date - NOT APPLICABLE    Dexa Scan up to date - NOT APPLICABLE    Flex Sig / Colonoscopy up to date - Yes  2006, repeat in 5 yrs    Immunizations up to date - Yes-Td 2006    Abuse: Current or Past(Physical, Sexual or Emotional)- No    Do you feel safe in your environment - Yes                         Social Determinants of Health     Financial Resource Strain: Low Risk  (5/3/2024)    Financial Resource Strain     Within the past 12 months, have you or your family members you live with been unable to get utilities (heat, electricity) when it was really needed?: No   Food Insecurity: Low Risk  (5/3/2024)    Food Insecurity     Within the past 12 months, did you worry that your food would run out before you got money to buy more?: No     Within the past 12 months, did the food you bought just not last and you didn t have money to get more?: No   Transportation Needs: Low Risk  (5/3/2024)    Transportation Needs     Within the past 12 months, has lack of transportation kept you from medical appointments, getting your medicines, non-medical meetings or appointments, work, or from getting things that you need?: No   Physical Activity: Patient Declined (5/3/2024)    Exercise Vital Sign     Days of Exercise per Week: Patient declined     Minutes of Exercise per Session: Patient declined   Stress: No Stress Concern Present (5/3/2024)    [employer] [place] [employer] - Occupational Stress Questionnaire     Feeling of Stress : Only a little   Social Connections: Unknown (5/3/2024)    Social Connection and Isolation Panel [NHANES]     Frequency of Communication with Friends and Family: Not on file     Frequency of Social Gatherings with Friends and Family: Once a week     Attends [employer] Services: Not on file     Active Member of Clubs or Organizations: Not on file     Attends Club or Organization Meetings: Not on file     Marital Status: Not on file   Interpersonal Safety: Low Risk  (5/7/2024)    Interpersonal Safety     Do you feel physically and emotionally safe where you currently live?: Yes     Within the past 12  "months, have you been hit, slapped, kicked or otherwise physically hurt by someone?: No     Within the past 12 months, have you been humiliated or emotionally abused in other ways by your partner or ex-partner?: No   Housing Stability: Low Risk  (5/3/2024)    Housing Stability     Do you have housing? : Yes     Are you worried about losing your housing?: No       Family History:  Family History   Problem Relation Age of Onset    Diabetes Father         adult    Neurologic Disorder Father         parkinsons    Cancer Father 95        lung    Heart Disease Father     C.A.D. Father     Heart Disease Mother     Hypertension Mother     Breast Cancer Mother     Thyroid Disease Mother     Lung Cancer Mother     Cancer Mother     Thyroid Disease Sister     Thyroid Disease Sister     Heart Disease Paternal Uncle         2 uncles MI    Colon Cancer No family hx of        Review of Systems:  In the last TWO WEEKS have you experienced any of the following symptoms?  Fevers: No  Night Sweats: No  Weight Gain: No  Pain at Night: No  Double Vision: No  Changes in Vision: No  Difficulty Breathing through Nose: No  Sore Throat in Morning: No  Dry Mouth in the Morning: No  Shortness of Breath Lying Flat: No  Shortness of Breath With Activity: No  Awakening with Shortness of Breath: No  Increased Cough: No  Heart Racing at Night: No  Swelling in Feet or Legs: No  Diarrhea at Night: No  Heartburn at Night: No  Urinating More than Once at Night: No  Losing Control of Urine at Night: No  Joint Pains at Night: No  Headaches in Morning: No  Weakness in Arms or Legs: No  Depressed Mood: No  Anxiety: No     Physical Examination:  Vitals: /85   Pulse 86   Resp 16   Ht 1.562 m (5' 1.5\")   Wt 82.7 kg (182 lb 4.8 oz)   LMP 05/19/2008   SpO2 97%   BMI 33.89 kg/m    BMI= Body mass index is 33.89 kg/m .  General appearance: Awake, alert, cooperative. Well groomed. Sitting comfortably in chair. In no apparent distress.  HEENT: Head: " "Normocephalic, atraumatic. Eyes: PERRL. Conjunctiva clear. Sclera normal. Nose: External appearance normal. Oropharynx: Mallampati Classification:II. Tonsils:1  hidden by pillars.   Neck: Neck Cir (cm): 38 cm (10/11/2024 10:00 AM)  Skin: No rashes or significant lesions.   Neurologic: Alert, oriented x3. No focal neurological deficit. Gait normal.   Psychiatric: Mood euthymic. Affect congruent with full range and intensity.         Data: All pertinent previous laboratory data reviewed     Recent Labs   Lab Test 05/07/24  1030 08/14/23  1502    140   POTASSIUM 4.6 4.2   CHLORIDE 105 107   CO2 26 22   ANIONGAP 9 11   * 99   BUN 16.0 10   CR 0.74 0.74   SONIA 9.0 9.4       Recent Labs   Lab Test 05/07/24  1030   WBC 5.1   RBC 5.34*   HGB 15.4   HCT 48.8*   MCV 91   MCH 28.8   MCHC 31.6   RDW 14.5          Recent Labs   Lab Test 05/07/24  1030   PROTTOTAL 6.7   ALBUMIN 4.0   BILITOTAL 0.3   ALKPHOS 96   AST 25   ALT 18       TSH   Date Value   10/04/2024 3.27 uIU/mL   05/07/2024 4.89 uIU/mL (H)   10/18/2022 4.69 mU/L (H)   11/22/2021 3.66 mU/L   10/19/2020 2.04 mU/L   06/26/2020 5.14 mU/L (H)       No results found for: \"UAMP\", \"UBARB\", \"BENZODIAZEUR\", \"UCANN\", \"UCOC\", \"OPIT\", \"UPCP\"    Iron Saturation Index   Date/Time Value Ref Range Status   09/27/2016 08:03 AM 23 15 - 46 % Final     Iron Sat Index   Date/Time Value Ref Range Status   06/15/2023 11:14 AM 36 15 - 46 % Final     Ferritin   Date/Time Value Ref Range Status   06/15/2023 11:14 AM 69 11 - 328 ng/mL Final   09/08/2011 09:04 AM 31 10 - 300 ng/mL Final       No results found for: \"PH\", \"PHARTERIAL\", \"PO2\", \"[id number]\", \"SAT\", \"PCO2\", \"HCO3\", \"BASEEXCESS\", \"ADOLFO\", \"BEB\"    @LABRCNTIPR(phv:4,pco2v:4,po2v:4,hco3v:4,brianna:4,o2per:4)@    Echocardiology: No results found for this or any previous visit (from the past 4320 hour(s)).    Chest x-ray: No results found for this or any previous visit from the past 365 days.      Chest CT: No results found " "for this or any previous visit from the past 365 days.      PFT: Most Recent [place] Pulmonary Function Testing    No results found for: \"20001\"  No results found for: \"20002\"  No results found for: \"20003\"  No results found for: \"20015\"  No results found for: \"20016\"  No results found for: \"20027\"  No results found for: \"20028\"  No results found for: \"20029\"  No results found for: \"20079\"  No results found for: \"20080\"  No results found for: \"20081\"  No results found for: \"20335\"  No results found for: \"20105\"  No results found for: \"20053\"  No results found for: \"20054\"  No results found for: \"20055\"      Ana Israel PA-C 10/11/2024     [facility]  [address] [address]     [facility] [place]  [address], [place] [address]    Chart documentation was completed, in part, with [employer] voice-recognition software. Even though reviewed, some grammatical, spelling, and word errors may remain.    45 minutes spent on day of encounter reviewing medical records, history and physical examination, review of previous testing and interpretation, documentation and further activities as noted above  "

## 2024-11-19 ENCOUNTER — MYC MEDICAL ADVICE (OUTPATIENT)
Dept: PEDIATRICS | Facility: CLINIC | Age: 69
End: 2024-11-19
Payer: MEDICARE

## 2024-11-19 NOTE — TELEPHONE ENCOUNTER
RN called and spoke with patient. Patient reports she has heartburn from stomach to throat, constant since a week ago. Patient has tried prilosec and tagamet, which helps. Patient stopped taking phentermine a week ago due to symptom of heartburn. Patient has insomnia occasionally, last night and the night before in the past week. Denies headache or dry mouth now. Denies any recent changes to diet. No protocol found for heartburn. Patient notes heartburn sensation is something she has experienced in the past with her GERD and prilosec is helping. Phentermine side effects do include gastrointestinal distress per UpToDate.  Patient would like to discuss medication and alternatives. Scheduled for Office visit w/ extender 11/20/24. RN advised if any new or worsening symptoms, patient should contact clinic and ask to speak with any triage nurse. Patient agreed with plan. No further questions at this time.     Jorge PAT RN 11/19/2024 at 5:35 PM

## 2024-11-20 ENCOUNTER — OFFICE VISIT (OUTPATIENT)
Dept: PEDIATRICS | Facility: CLINIC | Age: 69
End: 2024-11-20
Payer: MEDICARE

## 2024-11-20 VITALS
HEART RATE: 98 BPM | HEIGHT: 62 IN | SYSTOLIC BLOOD PRESSURE: 120 MMHG | DIASTOLIC BLOOD PRESSURE: 80 MMHG | TEMPERATURE: 96.8 F | BODY MASS INDEX: 32.46 KG/M2 | OXYGEN SATURATION: 97 % | WEIGHT: 176.4 LBS | RESPIRATION RATE: 20 BRPM

## 2024-11-20 DIAGNOSIS — E66.09 CLASS 1 OBESITY DUE TO EXCESS CALORIES WITHOUT SERIOUS COMORBIDITY WITH BODY MASS INDEX (BMI) OF 32.0 TO 32.9 IN ADULT: ICD-10-CM

## 2024-11-20 DIAGNOSIS — K44.9 HIATAL HERNIA: Primary | ICD-10-CM

## 2024-11-20 DIAGNOSIS — R09.89 RUNNY NOSE: ICD-10-CM

## 2024-11-20 DIAGNOSIS — E66.811 CLASS 1 OBESITY DUE TO EXCESS CALORIES WITHOUT SERIOUS COMORBIDITY WITH BODY MASS INDEX (BMI) OF 32.0 TO 32.9 IN ADULT: ICD-10-CM

## 2024-11-20 DIAGNOSIS — M79.10 MUSCLE ACHE: ICD-10-CM

## 2024-11-20 DIAGNOSIS — K21.9 GASTROESOPHAGEAL REFLUX DISEASE WITHOUT ESOPHAGITIS: ICD-10-CM

## 2024-11-20 PROCEDURE — 99214 OFFICE O/P EST MOD 30 MIN: CPT | Performed by: NURSE PRACTITIONER

## 2024-11-20 PROCEDURE — G2211 COMPLEX E/M VISIT ADD ON: HCPCS | Performed by: NURSE PRACTITIONER

## 2024-11-20 ASSESSMENT — PAIN SCALES - GENERAL: PAINLEVEL_OUTOF10: MILD PAIN (2)

## 2024-11-20 ASSESSMENT — ENCOUNTER SYMPTOMS: BACK PAIN: 1

## 2024-11-20 NOTE — PROGRESS NOTES
"  Assessment & Plan     Hiatal hernia  Gastroesophageal reflux disease without esophagitis  Last endoscopy 2020. Resume PPI, follow-up in 1 mos. No red flags (weight loss is intentional: on weight loss med). Seems unlikely med s/e.  - omeprazole (PRILOSEC) 20 MG DR capsule; Take 1 capsule (20 mg) by mouth daily.    Runny nose  Muscle ache  Mild, onset this morning. Will r/o COVID as cause.  - COVID-19 Virus (Coronavirus) by PCR; Future  - COVID-19 Virus (Coronavirus) by PCR Nose    Class 1 obesity due to excess calories without serious comorbidity with body mass index (BMI) of 32.0 to 32.9 in adult  On phentermine, she hopes to continue to lose weight.           BMI  Estimated body mass index is 32.05 kg/m  as calculated from the following:    Height as of this encounter: 1.58 m (5' 2.21\").    Weight as of this encounter: 80 kg (176 lb 6.4 oz).   Weight management plan: Patient was referred to their PCP to discuss a diet and exercise plan.      Patient Instructions   Restart omeprazole-once per day  Follow up In 1 mos    Covid test today    Ludy Schmitt is a 69 year old, presenting for the following health issues:  Abdominal Pain and Back Pain      11/20/2024    11:12 AM   Additional Questions   Roomed by Rosanne EVANS   Accompanied by N/A         11/20/2024    11:12 AM   Patient Reported Additional Medications   Patient reports taking the following new medications No     Back Pain     History of Present Illness       Reason for visit:  Phentermine side effects-symptoms  Symptom onset:  3-7 days ago  Symptoms include:  Moderate burning in stomach up to throat--similar to heartburn experienced a few years ago when taking prilosec-back pain -use heat pad  Symptom intensity:  Moderate  Symptom progression:  Staying the same  Had these symptoms before:  Yes  What makes it worse:  Laying down  What makes it better:  Sitting upright in chair      Has been on phentermine since May 2024, dose increase to 37.5 mg on " "7/8/24  Onset of symptoms 2-3 weeks, recently worsening  Epigastric region and up to throat. \"Nothing coming up\" but when she breathes she notes burning in the epigastric region and neck/upper chest   Rare NSAID use, rare alcohol intake  Non-smoker  Denies chest pain, dyspnea, palpitations, lightheadedness or dizziness  Pain is constant  Denies diarrhea or constipation, N/V  Denies troubles swalllowing  Doesn't seem exacerbated by food  No dietary changes    Wt Readings from Last 4 Encounters:   11/20/24 80 kg (176 lb 6.4 oz)   10/11/24 82.7 kg (182 lb 4.8 oz)   08/05/24 86.7 kg (191 lb 3.2 oz)   08/02/24 86.2 kg (190 lb)           Objective    /80 (BP Location: Right arm, Patient Position: Sitting, Cuff Size: Adult Regular)   Pulse 98   Temp 96.8  F (36  C) (Temporal)   Resp 20   Ht 1.58 m (5' 2.21\")   Wt 80 kg (176 lb 6.4 oz)   LMP 05/19/2008   SpO2 97%   BMI 32.05 kg/m    Body mass index is 32.05 kg/m .  Physical Exam   GENERAL: alert and no distress  RESP: lungs clear to auscultation - no rales, rhonchi or wheezes  CV: regular rate and rhythm, normal S1 S2, no S3 or S4, no murmur, click or rub  ABDOMEN: tenderness epigastric            Signed Electronically by: Renee Aldana NP    "

## 2024-11-21 LAB — SARS-COV-2 RNA RESP QL NAA+PROBE: NEGATIVE

## 2024-11-25 ENCOUNTER — TELEPHONE (OUTPATIENT)
Dept: PEDIATRICS | Facility: CLINIC | Age: 69
End: 2024-11-25
Payer: MEDICARE

## 2024-11-25 NOTE — TELEPHONE ENCOUNTER
Patient sent my chart-     Some Dates that would work: 12/5, 12/6, 12/10-12/13 and 12/16 morning appointments would be preferrable in the [place] Office.     Patient was admitted to Hospital.     New Meds started-     Colchicine 0.6 mg BID    Ibupropfen 200 mg tabs-  3 TID x 1 weeks, then 2 tabs TID x7, then 1 TID x7    Transitions of Care Assessment    Discharge Assessment  How are you doing now that you are home?: Feeling better  How are your symptoms? (Red Flag symptoms escalate to triage hotline per guidelines): Improved  Do you know how to contact your clinic care team if you have future questions or changes to your health status? : Yes  Does the patient have their discharge instructions? : Yes  Does the patient have questions regarding their discharge instructions? : No  Were you started on any new medications or were there changes to any of your previous medications? : Yes  Does the patient have all of their medications?: Yes  Do you have questions regarding any of your medications? : No  Do you have all of your needed medical supplies or equipment (DME)?  (i.e. oxygen tank, CPAP, cane, etc.): Yes    Follow up Plan     Discharge Follow-Up  Discharge follow up appointment scheduled in alignment with recommended follow up timeframe or Transitions of Risk Category? (Low = within 30 days; Moderate= within 14 days; High= within 7 days): Yes  Discharge Follow Up Appointment Date: 11/26/24  Discharge Follow Up Appointment Scheduled with?: Primary Care Provider    Future Appointments   Date Time Provider Department Center   11/26/2024  1:30 PM Renee Aldana NP [facility]   [facility] 10[facility]   12/17/2024 11:30 AM Renee Aldana NP [facility]   5/19/2025 10:00 AM Tatiana Leahy, APRN [facility]       Outpatient Plan as outlined on AVS reviewed with patient.    For any urgent concerns, please contact our 24 hour nurse triage line: [phone] ([phone])       Jazmín Michael,  RN          Appears she was hospitalized at [facility] 11/22-11/23          Transitions of Care Outreach  No chief complaint on file.      Most Recent Admission Date: 11/22/2024  Most Recent Admission Diagnosis: Pericardial Effusion    Most Recent Discharge Date: 11/23/2024   Most Recent Discharge Diagnosis: Pericardial Effusion     Transitions of Care Assessment         Follow up Plan      Assisted to schedule follow up.     Future Appointments   Date Time Provider Department Center   12/4/2024 10:45 AM E[facility]   12/17/2024 11:30 AM Renee Aldana NP [facility]   5/19/2025 10:00 AM Tatiana Leahy, APRN CNP [facility]       Outpatient Plan as outlined on AVS reviewed with patient.    For any urgent concerns, please contact our 24 hour nurse triage line: [phone] ([phone])       Jazmín Michael RN

## 2024-11-26 ENCOUNTER — OFFICE VISIT (OUTPATIENT)
Dept: PEDIATRICS | Facility: CLINIC | Age: 69
End: 2024-11-26
Payer: MEDICARE

## 2024-11-26 VITALS
WEIGHT: 179.8 LBS | OXYGEN SATURATION: 100 % | DIASTOLIC BLOOD PRESSURE: 82 MMHG | RESPIRATION RATE: 18 BRPM | BODY MASS INDEX: 33.09 KG/M2 | SYSTOLIC BLOOD PRESSURE: 127 MMHG | HEART RATE: 71 BPM | TEMPERATURE: 98 F | HEIGHT: 62 IN

## 2024-11-26 DIAGNOSIS — J34.89 SINUS PRESSURE: ICD-10-CM

## 2024-11-26 DIAGNOSIS — I31.39 PERICARDIAL EFFUSION: ICD-10-CM

## 2024-11-26 DIAGNOSIS — R79.89 ELEVATED TROPONIN: ICD-10-CM

## 2024-11-26 DIAGNOSIS — K44.9 HIATAL HERNIA: ICD-10-CM

## 2024-11-26 DIAGNOSIS — R07.9 CHEST PAIN, UNSPECIFIED TYPE: ICD-10-CM

## 2024-11-26 DIAGNOSIS — I30.9 ACUTE PERICARDITIS, UNSPECIFIED TYPE: Primary | ICD-10-CM

## 2024-11-26 LAB
BASOPHILS # BLD AUTO: 0.1 10E3/UL (ref 0–0.2)
BASOPHILS NFR BLD AUTO: 1 %
EOSINOPHIL # BLD AUTO: 0.5 10E3/UL (ref 0–0.7)
EOSINOPHIL NFR BLD AUTO: 7 %
ERYTHROCYTE [DISTWIDTH] IN BLOOD BY AUTOMATED COUNT: 15.6 % (ref 10–15)
HCT VFR BLD AUTO: 42.3 % (ref 35–47)
HGB BLD-MCNC: 13.5 G/DL (ref 11.7–15.7)
IMM GRANULOCYTES # BLD: 0 10E3/UL
IMM GRANULOCYTES NFR BLD: 1 %
LYMPHOCYTES # BLD AUTO: 1.2 10E3/UL (ref 0.8–5.3)
LYMPHOCYTES NFR BLD AUTO: 19 %
MCH RBC QN AUTO: 28.2 PG (ref 26.5–33)
MCHC RBC AUTO-ENTMCNC: 31.9 G/DL (ref 31.5–36.5)
MCV RBC AUTO: 88 FL (ref 78–100)
MONOCYTES # BLD AUTO: 0.6 10E3/UL (ref 0–1.3)
MONOCYTES NFR BLD AUTO: 10 %
NEUTROPHILS # BLD AUTO: 3.9 10E3/UL (ref 1.6–8.3)
NEUTROPHILS NFR BLD AUTO: 62 %
NRBC # BLD AUTO: 0 10E3/UL
NRBC BLD AUTO-RTO: 0 /100
PLATELET # BLD AUTO: 569 10E3/UL (ref 150–450)
RBC # BLD AUTO: 4.79 10E6/UL (ref 3.8–5.2)
WBC # BLD AUTO: 6.3 10E3/UL (ref 4–11)

## 2024-11-26 PROCEDURE — 99495 TRANSJ CARE MGMT MOD F2F 14D: CPT | Performed by: NURSE PRACTITIONER

## 2024-11-26 PROCEDURE — 36415 COLL VENOUS BLD VENIPUNCTURE: CPT | Performed by: NURSE PRACTITIONER

## 2024-11-26 PROCEDURE — 85025 COMPLETE CBC W/AUTO DIFF WBC: CPT | Performed by: NURSE PRACTITIONER

## 2024-11-26 NOTE — PATIENT INSTRUCTIONS
MAKE SURE YOU FOLLOW-UP WITH CARDIOLOGY IN 4 WEEKS  CONTINUE ON MEDS FROM HOSPITAL DISCHARGE  WE WILL RECHECK LABS TODAY

## 2024-11-26 NOTE — PROGRESS NOTES
Assessment & Plan   Pericardial effusion  Chest pain, unspecified type  Acute pericarditis, unspecified type  Sinus pressure  Elevated troponin  Pt feels much better, back to baseline. Pt will need repeat labs and cards follow-up in 4 weeks, pt will follow-up on this.  - CBC with platelets and differential; Future  - CBC with platelets and differential    Hiatal hernia  Possibly also was contributing to the above symptoms. Pt started on PPI last visit and feels symptoms have improved.      MED REC REQUIRED  Post Medication Reconciliation Status:  Discharge medications reconciled, continue medications without change    Patient Instructions   MAKE SURE YOU FOLLOW-UP WITH CARDIOLOGY IN 4 WEEKS  CONTINUE ON MEDS FROM HOSPITAL DISCHARGE  WE WILL RECHECK LABS TODAY    TORB: Labs in 4 weeks - BMP, Sed Rate, CRP - dx pericarditis and then follow-up with [facility]. Follow-up with Dr. Vines in 6 months.   Ludy Schmitt is a 69 year old, presenting for the following health issues:  Hospital F/U    [facility]        Hospital Follow-up Visit:    Hospital/Nursing Home/ Rehab Facility: [facility]   Date of Admission: 11/22/2024 2:30 AM  Date of Discharge: 11/23/2024 11:15 AM  Reason(s) for Admission: PERICARITIS WENT IN WITH CHEST PAIN AND SHORTNESS OF BREATH   Was the patient in the ICU or did the patient experience delirium during hospitalization?  No  Do you have any other stressors you would like to discuss with your provider? Health Concerns    Problems taking medications regularly:  None  Medication changes since discharge: None  Problems adhering to non-medication therapy:  None    Summary of hospitalization:  HAS DISCHARGE SUMMARY WITH HER  Diagnostic Tests/Treatments reviewed.  Follow up needed: cardiology and labs in 4 weeks  Other Healthcare Providers Involved in Patient s Care:          cardiology  Update since discharge: improved.         Plan of care communicated with patient    Pt feels much better  Denies chest pain,  "dyspnea, fever or chills  Feels like epigastric burning is also resolved  Taking meds as prescribed                       Objective    /82 (BP Location: Right arm, Patient Position: Sitting, Cuff Size: Adult Regular)   Pulse 71   Temp 98  F (36.7  C) (Temporal)   Resp 18   Ht 1.575 m (5' 2\")   Wt 81.6 kg (179 lb 12.8 oz)   LMP 05/19/2008   SpO2 100%   BMI 32.89 kg/m    Body mass index is 32.89 kg/m .  Physical Exam   GENERAL: alert and no distress  RESP: lungs clear to auscultation - no rales, rhonchi or wheezes  CV: regular rate and rhythm, normal S1 S2, no S3 or S4, no murmur, click or rub, no peripheral edema             Signed Electronically by: Renee Aldana NP    "

## 2024-12-04 ENCOUNTER — ANCILLARY PROCEDURE (OUTPATIENT)
Dept: MAMMOGRAPHY | Facility: CLINIC | Age: 69
End: 2024-12-04
Attending: NURSE PRACTITIONER
Payer: MEDICARE

## 2024-12-04 DIAGNOSIS — Z12.31 VISIT FOR SCREENING MAMMOGRAM: ICD-10-CM

## 2024-12-17 ENCOUNTER — VIRTUAL VISIT (OUTPATIENT)
Dept: PEDIATRICS | Facility: CLINIC | Age: 69
End: 2024-12-17
Payer: MEDICARE

## 2024-12-17 DIAGNOSIS — E66.09 CLASS 1 OBESITY DUE TO EXCESS CALORIES WITHOUT SERIOUS COMORBIDITY WITH BODY MASS INDEX (BMI) OF 33.0 TO 33.9 IN ADULT: ICD-10-CM

## 2024-12-17 DIAGNOSIS — K21.9 GASTROESOPHAGEAL REFLUX DISEASE WITHOUT ESOPHAGITIS: ICD-10-CM

## 2024-12-17 DIAGNOSIS — K44.9 HIATAL HERNIA: Primary | ICD-10-CM

## 2024-12-17 DIAGNOSIS — E66.811 CLASS 1 OBESITY DUE TO EXCESS CALORIES WITHOUT SERIOUS COMORBIDITY WITH BODY MASS INDEX (BMI) OF 33.0 TO 33.9 IN ADULT: ICD-10-CM

## 2024-12-17 PROBLEM — E66.01 MORBID OBESITY (H): Status: RESOLVED | Noted: 2022-10-06 | Resolved: 2024-12-17

## 2024-12-17 PROCEDURE — 99441 PR PHYSICIAN TELEPHONE EVALUATION 5-10 MIN: CPT | Mod: 93 | Performed by: NURSE PRACTITIONER

## 2024-12-17 RX ORDER — PHENTERMINE HYDROCHLORIDE 37.5 MG/1
37.5 TABLET ORAL
Qty: 30 TABLET | Refills: 0 | Status: SHIPPED | OUTPATIENT
Start: 2024-12-17

## 2024-12-17 NOTE — PROGRESS NOTES
Keshia is a 69 year old who is being evaluated via a billable telephone visit.    What phone number would you like to be contacted at? [phone]  How would you like to obtain your AVS? Tala  Originating Location (pt. Location): Home    Distant Location (provider location):  On-site    Assessment & Plan     Hiatal hernia  Gastroesophageal reflux disease without esophagitis  Improved with PPI. Had endoscopy in 2020 and 2015 with most recent pathology normal but moderate sized hiatal hernia. Continue on PPI for now: can discuss with PCP about trial of discontinue, pepcid vs continue at next follow-up.  - omeprazole (PRILOSEC) 20 MG DR capsule; Take 1 capsule (20 mg) by mouth daily.    Class 1 obesity due to excess calories without serious comorbidity with body mass index (BMI) of 33.0 to 33.9 in adult  Tolerating phentermine, would like to continue. BPs stable and no s/e noted. Refills given.          Subjective   Keshia is a 69 year old, presenting for the following health issues:  Recheck Medication        12/17/2024    10:57 AM   Additional Questions   Roomed by Ruchi BROWN   Accompanied by Self         12/17/2024    10:57 AM   Patient Reported Additional Medications   Patient reports taking the following new medications no     History of Present Illness       Reason for visit:  Follow up  Symptom onset:  Today  Symptoms include:  NA  : NA.  : NA.  : NA.  Prior treatment description:  NA  What makes it worse:  NA  What makes it better:  NA    She eats 2-3 servings of fruits and vegetables daily.She consumes 0 sweetened beverage(s) daily.She exercises with enough effort to increase her heart rate 30 to 60 minutes per day.  She exercises with enough effort to increase her heart rate 3 or less days per week.   She is taking medications regularly.     Wt Readings from Last 4 Encounters:   11/26/24 81.6 kg (179 lb 12.8 oz)   11/20/24 80 kg (176 lb 6.4 oz)   10/11/24 82.7 kg (182 lb 4.8 oz)   08/05/24 86.7 kg (191 lb  3.2 oz)     BP Readings from Last 3 Encounters:   11/26/24 127/82   11/20/24 120/80   10/11/24 128/85       Has been on phentermine since 5/7/24  Home weight 170 lb. She thinks she started around 200 lb  Walking daily, around 1 mile per day  Eating more fruits and veggies    GERD  Feels symptoms have completely resolved with PPI  Hx of hiatal hernia on endoscopy, otherwise normal EGD in 2020 and 2015              Objective    Vitals - Patient Reported  Pain Score: No Pain (0)      Vitals:  No vitals were obtained today due to virtual visit.    Physical Exam   General: Alert and no distress //Respiratory: No audible wheeze, cough, or shortness of breath // Psychiatric:  Appropriate affect, tone, and pace of words            Phone call duration: 8 minutes  Signed Electronically by: Renee Aldana NP

## 2024-12-23 ENCOUNTER — DOCUMENTATION ONLY (OUTPATIENT)
Dept: PHARMACY | Facility: CLINIC | Age: 69
End: 2024-12-23
Payer: MEDICARE

## 2024-12-23 NOTE — PROGRESS NOTES
This patient is due for MTM follow-up.     Patient is not reachable after several attempts. We will stop reaching out to the patient at this time. Please let us know if we can assist in this patient's care in the future. Routed to PCP as MALGORZATA Lutz, PharmD BCPS  Medication Therapy Management Practitioner  Pharmacist

## 2025-02-19 DIAGNOSIS — E66.811 CLASS 1 OBESITY DUE TO EXCESS CALORIES WITHOUT SERIOUS COMORBIDITY WITH BODY MASS INDEX (BMI) OF 33.0 TO 33.9 IN ADULT: ICD-10-CM

## 2025-02-19 DIAGNOSIS — E66.09 CLASS 1 OBESITY DUE TO EXCESS CALORIES WITHOUT SERIOUS COMORBIDITY WITH BODY MASS INDEX (BMI) OF 33.0 TO 33.9 IN ADULT: ICD-10-CM

## 2025-02-19 RX ORDER — PHENTERMINE HYDROCHLORIDE 37.5 MG/1
1 TABLET ORAL
Qty: 30 TABLET | Refills: 0 | Status: SHIPPED | OUTPATIENT
Start: 2025-02-19

## 2025-02-19 NOTE — TELEPHONE ENCOUNTER
This was route to the primary care pool. There are no directions?    Thank you kindly,  Wade NORTON

## 2025-03-01 ENCOUNTER — MYC MEDICAL ADVICE (OUTPATIENT)
Dept: PEDIATRICS | Facility: CLINIC | Age: 70
End: 2025-03-01
Payer: MEDICARE

## 2025-03-01 DIAGNOSIS — N39.0 URINARY TRACT INFECTION WITHOUT HEMATURIA, SITE UNSPECIFIED: Primary | ICD-10-CM

## 2025-03-03 DIAGNOSIS — R31.9 HEMATURIA, UNSPECIFIED TYPE: Primary | ICD-10-CM

## 2025-03-03 NOTE — TELEPHONE ENCOUNTER
"See patient's Vendorm[employer] message   - Patient requesting a referral to urology   - Patient states that she was seen at Urgent Care for a UTI     Upon chart review:   - 2/28/2025 urgent care office visit with Taryn Molina PA-C states: \"Treatment with medication as listed above, urine culture is pending.  If her urine culture is negative, she will need follow-up with urology for further events indication.\"   - 2/28/2025 urine culture result note from Dinora Fernandez PA-C states: \"Please call patient and notify that her urine culture was negative.  Was advised by provider to follow-up with urology for further evaluation\"     - Pended urology referral     Routing to the [facility] Providers pool to please review and place the urology referral as appropriate.     Renee CHOW RN   [facility]   "

## 2025-03-13 ENCOUNTER — OFFICE VISIT (OUTPATIENT)
Dept: UROLOGY | Facility: CLINIC | Age: 70
End: 2025-03-13
Attending: NURSE PRACTITIONER
Payer: MEDICARE

## 2025-03-13 VITALS
SYSTOLIC BLOOD PRESSURE: 122 MMHG | BODY MASS INDEX: 32.94 KG/M2 | HEIGHT: 62 IN | WEIGHT: 179 LBS | DIASTOLIC BLOOD PRESSURE: 84 MMHG

## 2025-03-13 DIAGNOSIS — R31.9 HEMATURIA, UNSPECIFIED TYPE: Primary | ICD-10-CM

## 2025-03-13 LAB
ALBUMIN UR-MCNC: NEGATIVE MG/DL
APPEARANCE UR: CLEAR
BILIRUB UR QL STRIP: NEGATIVE
COLOR UR AUTO: YELLOW
GLUCOSE UR STRIP-MCNC: NEGATIVE MG/DL
HGB UR QL STRIP: NEGATIVE
KETONES UR STRIP-MCNC: NEGATIVE MG/DL
LEUKOCYTE ESTERASE UR QL STRIP: NEGATIVE
NITRATE UR QL: NEGATIVE
PH UR STRIP: 5.5 [PH] (ref 5–7)
SP GR UR STRIP: 1.01 (ref 1–1.03)
UROBILINOGEN UR STRIP-ACNC: 0.2 E.U./DL

## 2025-03-13 ASSESSMENT — PAIN SCALES - GENERAL: PAINLEVEL_OUTOF10: NO PAIN (0)

## 2025-03-13 NOTE — PATIENT INSTRUCTIONS

## 2025-03-13 NOTE — PROGRESS NOTES
Chief Complaint:    Microscopic Hematuria           Consult or Referral:     Mr. Vanessa Mckeon is a 69 year old female seen at the request of Dr. Hadley.         History of Present Illness:     Vanessa Mckeon is a 69 year old female being seen for microscopic hematuria.  Duration of problem: 1 months  Previous treatments: None      Reviewed previous notes from Dr. Hadley    Keshia had some dysuria and lower urinary tract symptoms and was seen in the urgent care  She was treated with antibiotics though urine culture was not significant  Her UA at that time was positive for microscopic hematuria with more than 100 RBCs  UA today seems to be normal and she denies any significant symptoms  She has completed her course of antibiotics  She has never had gross hematuria             Past Medical History:     Past Medical History:   Diagnosis Date    Anemia     Arthritis     Aleve occasionally    Benign neoplasm of colon     Cervical high risk HPV (human papillomavirus) test positive 10/02/2017 & 10/9/2018 & 10/24/2019    10/2/17 NIL, +HR HPV, not 16/18    Closed fracture of distal end of left fibula, unspecified fracture morphology, initial encounter 03/20/2024    Gastro-oesophageal reflux disease     Hiatal hernia     History of blood transfusion     Iron deficiency    Hypertrophy of breast     fibrous left breast- benign    Lumbago     Malignant neoplasm (H)     Mild dysplasia of cervix 12/03/2019    Obese     Pure hypercholesterolemia     Skin cancer     Status post open reduction with internal fixation (ORIF) of fracture of ankle 03/20/2024    Thyroid disease     Hypothyroidism- Levothyroxine            Past Surgical History:     Past Surgical History:   Procedure Laterality Date    BIOPSY OF BREAST, INCISIONAL  2004, 2001    left breast    COLONOSCOPY  10/08/2011    Procedure:COMBINED COLONOSCOPY, SINGLE BIOPSY/POLYPECTOMY BY BIOPSY; COLONOSCOPY; Surgeon:DARIANA RUIZ; Location: GI    COLONOSCOPY  N/A 10/13/2015    Procedure: COLONOSCOPY;  Surgeon: Toi Sotelo MD;  Location:  GI    COLONOSCOPY Left 07/13/2020    Procedure: COLONOSCOPY;  Surgeon: Toi Sotelo MD;  Location:  GI    DILATION AND CURETTAGE, OPERATIVE HYSTEROSCOPY WITH MORCELLATOR, COMBINED N/A 08/09/2022    Procedure: Hystersocopy, dilation and curettage using morcellator under abdominal ultrasound guidance.;  Surgeon: Linda Schmitt MD;  Location:  OR    ESOPHAGOSCOPY, GASTROSCOPY, DUODENOSCOPY (EGD), COMBINED Left 07/13/2020    Procedure: ESOPHAGOGASTRODUODENOSCOPY, WITH BIOPSies using biopsy forcep;  Surgeon: Toi Sotelo MD;  Location:  GI    OPEN REDUCTION INTERNAL FIXATION ANKLE Left 01/09/2024    Procedure: OPEN REDUCTION INTERNAL FIXATION LEFT ANKLE FRACTURE;  Surgeon: Doron Michelle DPM;  Location:  OR    PHACOEMULSIFICATION CLEAR CORNEA WITH STANDARD INTRAOCULAR LENS IMPLANT  10/04/2012    Procedure: PHACOEMULSIFICATION CLEAR CORNEA WITH STANDARD INTRAOCULAR LENS IMPLANT;  RIGHT PHACOEMULSIFICATION CLEAR CORNEA WITH STANDARD INTRAOCULAR LENS IMPLANT;  Surgeon: Óscar Torrez MD;  Location: [facility]    PHACOEMULSIFICATION CLEAR CORNEA WITH STANDARD INTRAOCULAR LENS IMPLANT  11/01/2012    Procedure: PHACOEMULSIFICATION CLEAR CORNEA WITH STANDARD INTRAOCULAR LENS IMPLANT;  LEFT  PHACOEMULSIFICATION CLEAR CORNEA WITH STANDARD INTRAOCULAR LENS IMPLANT ;  Surgeon: Óscar Torrez MD;  Location:  [facility]    SURGICAL HISTORY OF -       removal of skin cancer    ZZC EXPLORATORY OF ABDOMEN  1980    Laparotomy, thought she had ovarian cyst, but nothing was found            Medications     Current Outpatient Medications   Medication Sig Dispense Refill    atorvastatin (LIPITOR) 10 MG tablet Take 1 tablet (10 mg) by mouth daily 90 tablet 4    fluticasone (FLONASE) 50 MCG/ACT nasal spray Spray 2 sprays into both nostrils daily 1 Package 2    ibuprofen (ADVIL/MOTRIN) 600 MG tablet Take 600mg of ibuprofen  every 6 hours x 7 days to assist in pain control.  If you are not tolerating the medication, you are ok to stop. 28 tablet 0    levothyroxine (SYNTHROID/LEVOTHROID) 100 MCG tablet Take 1 tablet (100 mcg) by mouth daily 90 tablet 3    Multiple Vitamins-Minerals (MULTIVITAMIN WOMEN 50+) TABS Take 1 tablet by mouth daily      Naproxen Sodium (ALEVE PO)       omeprazole (PRILOSEC) 20 MG DR capsule Take 1 capsule (20 mg) by mouth daily. 90 capsule 0    phentermine (ADIPEX-P) 37.5 MG tablet TAKE ONE TABLET BY MOUTH EVERY MORNING BEFORE BREAKFAST 30 tablet 0     No current facility-administered medications for this visit.            Family History:     Family History   Problem Relation Age of Onset    Diabetes Father         adult    Neurologic Disorder Father         parkinsons    Cancer Father 95        lung    Heart Disease Father     C.A.D. 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   Tobacco Use    Smoking status: Former     Current packs/day: 0.00     Average packs/day: 0.5 packs/day for 10.0 years (5.0 ttl pk-yrs)     Types: Cigarettes     Start date: 1993     Quit date: 2003     Years since quittin.9     Passive exposure: Past    Smokeless tobacco: Never   Vaping Use    Vaping status: Never Used   Substance and Sexual Activity    Alcohol use: Not Currently     Comment: holidays    Drug use: Never    Sexual activity: Not Currently     Partners: Male      Birth control/protection: Abstinence   Other Topics Concern    Parent/sibling w/ CABG, MI or angioplasty before 65F 55M? No   Social History Narrative    Dairy/d 3-4 servings/d    Caffeine 5 servings/d    Exercise 4-5 x week    Sunscreen used - Yes    Seatbelts used - Yes    Working smoke/CO detectors in the home - Yes    Guns stored in the home - No    Self Breast Exams - Yes    Self Testicular Exam - NOT APPLICABLE    Eye Exam up to date - no,will make appt    Dental Exam up to date - Yes-this past Sat    Pap Smear up to date - no    Mammogram up to date - June 8 2009,new ins,will be getting another mammo soon    PSA up to date - NOT APPLICABLE    Dexa Scan up to date - NOT APPLICABLE    Flex Sig / Colonoscopy up to date - Yes 2006, repeat in 5 yrs    Immunizations up to date - Yes-Td 2006    Abuse: Current or Past(Physical, Sexual or Emotional)- No    Do you feel safe in your environment - Yes                         Social Drivers of Health     Financial Resource Strain: Low Risk  (11/22/2024)    Received from [employer]    Financial Resource Strain     Difficulty of Paying Living Expenses: 3     Difficulty of Paying Living Expenses: Not on file   Food Insecurity: No Food Insecurity (11/22/2024)    Received from [employer]    Food Insecurity     Do you worry your food will run out before you are able to buy more?: 1   Transportation Needs: No Transportation Needs (11/22/2024)    Received from [employer]    Transportation Needs     Does lack of transportation keep you from medical appointments?: 1     Does lack of transportation keep you from work, meetings or getting things that you need?: 1   Physical Activity: Patient Declined (5/3/2024)    Exercise Vital Sign     Days of Exercise per Week: Patient declined     Minutes of Exercise per Session: Patient declined   Stress: No Stress Concern Present (5/3/2024)     "[facility] - Occupational Stress Questionnaire     Feeling of Stress : Only a little   Social Connections: Socially Integrated (11/22/2024)    [facility]    Social Connections     Do you often feel lonely or isolated from those around you?: 0   Interpersonal Safety: Low Risk  (11/20/2024)    Interpersonal Safety     Do you feel physically and emotionally safe where you currently live?: Yes     Within the past 12 months, have you been hit, slapped, kicked or otherwise physically hurt by someone?: No     Within the past 12 months, have you been humiliated or emotionally abused in other ways by your partner or ex-partner?: No   Housing Stability: Low Risk  (11/22/2024)    [facility]    Housing Stability     What is your housing situation today?: 1            Allergies:   Patient has no known allergies.         Review of Systems:  From intake questionnaire     Skin: negative  Eyes: negative  Ears/Nose/Throat: negative  Respiratory: No shortness of breath, dyspnea on exertion, cough, or hemoptysis  Cardiovascular: No chest pain or palpitations  Gastrointestinal: negative; no nausea/vomiting, constipation or diarrhea  Genitourinary: as per HPI  Musculoskeletal: negative  Neurologic: negative  Psychiatric: negative  Hematologic/Lymphatic/Immunologic: negative  Endocrine: negative         Physical Exam:     Patient is a 69 year old  female   Vitals: Blood pressure 122/84, height 1.575 m (5' 2\"), weight 81.2 kg (179 lb), last menstrual period 05/19/2008, not currently breastfeeding.  Constitutional: Body mass index is 32.74 kg/m .  Alert, no acute distress, oriented, conversant  Eyes: no scleral icterus; extraocular muscles intact, moist conjunctivae  Neck: trachea midline, no thyromegaly  Ears/nose/mouth: throat/mouth:normal, good dentition  Respiratory: no respiratory distress, or pursed lip " breathing  Cardiovascular: pulses strong and intact; no obvious jugular venous distension present  Gastrointestinal: soft, nontender, no organomegaly or masses,   Lymphatics: No inguinal adenopathy  Musculoskeletal: extremities normal, no peripheral edema  Skin: no suspicious lesions or rashes  Neuro: Alert, oriented, speech and mentation normal  Psych: affect and mood normal, alert and oriented to person, place and time  Gait: Normal  : deferred to cystoscopy      Cystoscopy notes    Pre-procedure diagnosis: Microscopic hematuria  Post procedure diagnosis: normal cystoscopy  Procedure performed: cystoscopy  Surgeon: Ernesto Sarkar MD  Anesthesia: local    Indications for procedure: Patient is a 69 year old year old female with a history of of microscopic hematuria.  Here today for cysto    Description of procedure: After fully informed voluntary consent was obtained patient was brought into the procedure room, identified and placed in a supine position on the cysto table.  The vagina/intraoitus were prepped and draped in a sterile fashion with betadine.  A 15F flexible cystoscope was inserted into the urethra and the bladder and urethra examined in a systematic manner.  There were no tumor stones or diverticula.  Ureteric orifices were normal in position and number and effluxing clear urine.   Distal urethra was normal.  The patient tolerated the procedure well and there were no complications.      Assessment/Plan: Patient with a history of hematuria with negative cystoscopy.  Please review the detailed notes    Ernesto Sarkar MD      Labs and Pathology:    The following labs were reviewed by me and discussed with the patient:  Component      Latest Ref Rng 2/28/2025  12:29 PM 3/13/2025  1:58 PM   Color Urine      Colorless, Straw, Light Yellow, Yellow  Ana !  Yellow    Appearance Urine      Clear  Slightly Cloudy !  Clear    Glucose Urine      Negative mg/dL Negative  Negative    Bilirubin Urine       "Negative  Negative  Negative    Ketones Urine      Negative mg/dL Negative  Negative    Specific Gravity Urine      1.003 - 1.035  1.015  1.010    Blood Urine      Negative  Large !  Negative    pH Urine      5.0 - 7.0  5.5  5.5    Protein Albumin Urine      Negative mg/dL 30 !  Negative    Urobilinogen Urine      0.2, 1.0 E.U./dL 0.2  0.2    Nitrite Urine      Negative  Negative  Negative    Leukocyte Esterase Urine      Negative  Negative  Negative       Legend:  ! Abnormal  Significant for   Lab Results   Component Value Date    CR 0.74 05/07/2024    CR 0.74 08/14/2023    CR 0.69 06/15/2023    CR 0.61 10/18/2022    CR 0.72 11/10/2016    CR 0.68 09/27/2016    CR 0.68 02/24/2015    CR 0.73 09/10/2014    CR 0.55 07/01/2013    CR 0.60 06/28/2012    CR 0.70 06/28/2011    CR 0.89 12/28/2010    CR 0.71 04/15/2010    CR 0.64 04/13/2009     No results found for: \"PSA\"          Imaging:    The following imaging exams were independently viewed and interpreted by me and discussed with patient:                 Assessment and Plan:     Hematuria, unspecified type  Microscopic hematuria  Cystoscopy today was normal  Recommended CT urogram along with cytology to be sent  Discussed in detail about possible causes of microscopic hematuria and the need for complete evaluation in view of her age and the extent of hematuria seen on the UA recently  She is agreeable  We will have her updated via [facility] about the results and if she needs any additional follow-up  - Adult Urology  Referral  - UA without Microscopic; Future  - UA without Microscopic  - CT Urogram wo & w Contrast; Future  - Cytology, non-gynecologic      Plan:  Plan for CT urogram and cytology    Orders  Orders Placed This Encounter   Procedures    CT Urogram wo & w Contrast    UA without Microscopic    Cytology, non-gynecologic       Ernesto Sarkar MD  [facility] [place]      ==========================    Additional Billing and Coding " Information:  Review of external notes as documented above   Review of the result(s) of each unique test - UA, creatinine                20 minutes spent by me on the date of the encounter doing chart review, review of test results, interpretation of tests, patient visit, and documentation apart from time spent at cystoscopy    ==========================

## 2025-03-13 NOTE — NURSING NOTE
Chief Complaint   Patient presents with    Gross Hematuria     Pt here for in office cystoscopy      Prior to the start of the procedure and with procedural staff participation, I verbally confirmed the patient s identity using two indicators, relevant allergies, that the procedure was appropriate and matched the consent or emergent situation, and that the correct equipment/implants were available. Immediately prior to starting the procedure I conducted the Time Out with the procedural staff and re-confirmed the patient s name, procedure, and site/side. I have wiped the patient off with the povidone-Iodine solution, draped them,  used Lidocaine hydrochloride jelly, and instilled sterile water into the bladder. (The Joint Commission universal protocol was followed.)  Yes    Sedation (Moderate or Deep): None     Nida Aguirre CMA

## 2025-03-13 NOTE — LETTER
3/13/2025       RE: Vanessa Mckeon  [address]  [address]  [place] [address]     Dear Colleague,    Thank you for referring your patient, Vanessa Mckeon, to the [facility] UROLOGY [facility]IN[facility] [place] at [facility]. Please see a copy of my visit note below.          Chief Complaint:    Microscopic Hematuria           Consult or Referral:     Mr. Vanessa Mckeon is a 69 year old female seen at the request of Dr. Hadley.         History of Present Illness:     Vanessa Mckeon is a 69 year old female being seen for microscopic hematuria.  Duration of problem: 1 months  Previous treatments: None      Reviewed previous notes from Dr. Hadley    Keshia had some dysuria and lower urinary tract symptoms and was seen in the urgent care  She was treated with antibiotics though urine culture was not significant  Her UA at that time was positive for microscopic hematuria with more than 100 RBCs  UA today seems to be normal and she denies any significant symptoms  She has completed her course of antibiotics  She has never had gross hematuria             Past Medical History:     Past Medical History:   Diagnosis Date     Anemia      Arthritis     Aleve occasionally     Benign neoplasm of colon      Cervical high risk HPV (human papillomavirus) test positive 10/02/2017 & 10/9/2018 & 10/24/2019    10/2/17 NIL, +HR HPV, not 16/18     Closed fracture of distal end of left fibula, unspecified fracture morphology, initial encounter 03/20/2024     Gastro-oesophageal reflux disease      Hiatal hernia      History of blood transfusion     Iron deficiency     Hypertrophy of breast     fibrous left breast- benign     Lumbago      Malignant neoplasm (H)      Mild dysplasia of cervix 12/03/2019     Obese      Pure hypercholesterolemia      Skin cancer      Status post open reduction with internal fixation (ORIF) of fracture of ankle 03/20/2024     Thyroid disease      Hypothyroidism- Levothyroxine            Past Surgical History:     Past Surgical History:   Procedure Laterality Date     BIOPSY OF BREAST, INCISIONAL  2004, 2001    left breast     COLONOSCOPY  10/08/2011    Procedure:COMBINED COLONOSCOPY, SINGLE BIOPSY/POLYPECTOMY BY BIOPSY; COLONOSCOPY; Surgeon:DARIANA RUIZ; Location: GI     COLONOSCOPY N/A 10/13/2015    Procedure: COLONOSCOPY;  Surgeon: Toi Sotelo MD;  Location:  GI     COLONOSCOPY Left 07/13/2020    Procedure: COLONOSCOPY;  Surgeon: Toi Sotelo MD;  Location:  GI     DILATION AND CURETTAGE, OPERATIVE HYSTEROSCOPY WITH MORCELLATOR, COMBINED N/A 08/09/2022    Procedure: Hystersocopy, dilation and curettage using morcellator under abdominal ultrasound guidance.;  Surgeon: Linda Schmitt MD;  Location:  OR     ESOPHAGOSCOPY, GASTROSCOPY, DUODENOSCOPY (EGD), COMBINED Left 07/13/2020    Procedure: ESOPHAGOGASTRODUODENOSCOPY, WITH BIOPSies using biopsy forcep;  Surgeon: Toi Sotelo MD;  Location:  GI     OPEN REDUCTION INTERNAL FIXATION ANKLE Left 01/09/2024    Procedure: OPEN REDUCTION INTERNAL FIXATION LEFT ANKLE FRACTURE;  Surgeon: Doron Michelle DPM;  Location:  OR     PHACOEMULSIFICATION CLEAR CORNEA WITH STANDARD INTRAOCULAR LENS IMPLANT  10/04/2012    Procedure: PHACOEMULSIFICATION CLEAR CORNEA WITH STANDARD INTRAOCULAR LENS IMPLANT;  RIGHT PHACOEMULSIFICATION CLEAR CORNEA WITH STANDARD INTRAOCULAR LENS IMPLANT;  Surgeon: Óscar Torrez MD;  Location:  EC     PHACOEMULSIFICATION CLEAR CORNEA WITH STANDARD INTRAOCULAR LENS IMPLANT  11/01/2012    Procedure: PHACOEMULSIFICATION CLEAR CORNEA WITH STANDARD INTRAOCULAR LENS IMPLANT;  LEFT  PHACOEMULSIFICATION CLEAR CORNEA WITH STANDARD INTRAOCULAR LENS IMPLANT ;  Surgeon: Óscar Torrez MD;  Location:  EC     SURGICAL HISTORY OF -       removal of skin cancer     [facility] EXPLORATORY OF ABDOMEN  1980    Laparotomy, thought she had ovarian cyst, but nothing  was found            Medications     Current Outpatient Medications   Medication Sig Dispense Refill     atorvastatin (LIPITOR) 10 MG tablet Take 1 tablet (10 mg) by mouth daily 90 tablet 4     fluticasone (FLONASE) 50 MCG/ACT nasal spray Spray 2 sprays into both nostrils daily 1 Package 2     ibuprofen (ADVIL/MOTRIN) 600 MG tablet Take 600mg of ibuprofen every 6 hours x 7 days to assist in pain control.  If you are not tolerating the medication, you are ok to stop. 28 tablet 0     levothyroxine (SYNTHROID/LEVOTHROID) 100 MCG tablet Take 1 tablet (100 mcg) by mouth daily 90 tablet 3     Multiple Vitamins-Minerals (MULTIVITAMIN WOMEN 50+) TABS Take 1 tablet by mouth daily       Naproxen Sodium (ALEVE PO)        omeprazole (PRILOSEC) 20 MG DR capsule Take 1 capsule (20 mg) by mouth daily. 90 capsule 0     phentermine (ADIPEX-P) 37.5 MG tablet TAKE ONE TABLET BY MOUTH EVERY MORNING BEFORE BREAKFAST 30 tablet 0     No current facility-administered medications for this visit.            Family History:     Family History   Problem Relation Age of Onset     Diabetes Father         adult     Neurologic Disorder Father         parkinsons     Cancer Father 95        lung     Heart Disease Father      C.A.D. 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   Tobacco Use     Smoking status: Former     Current packs/day: 0.00     Average packs/day: 0.5 packs/day for 10.0  years (5.0 ttl pk-yrs)     Types: Cigarettes     Start date: 1993     Quit date: 2003     Years since quittin.9     Passive exposure: Past     Smokeless tobacco: Never   Vaping Use     Vaping status: Never Used   Substance and Sexual Activity     Alcohol use: Not Currently     Comment: holidays     Drug use: Never     Sexual activity: Not Currently     Partners: Male     Birth control/protection: Abstinence   Other Topics Concern     Parent/sibling w/ CABG, MI or angioplasty before 65F 55M? No   Social History Narrative    Dairy/d 3-4 servings/d    Caffeine 5 servings/d    Exercise 4-5 x week    Sunscreen used - Yes    Seatbelts used - Yes    Working smoke/CO detectors in the home - Yes    Guns stored in the home - No    Self Breast Exams - Yes    Self Testicular Exam - NOT APPLICABLE    Eye Exam up to date - no,will make appt    Dental Exam up to date - Yes-this past Sat    Pap Smear up to date - no    Mammogram up to date - 2009,new ins,will be getting another mammo soon    PSA up to date - NOT APPLICABLE    Dexa Scan up to date - NOT APPLICABLE    Flex Sig / Colonoscopy up to date - Yes , repeat in 5 yrs    Immunizations up to date - Yes-Td     Abuse: Current or Past(Physical, Sexual or Emotional)- No    Do you feel safe in your environment - Yes                         Social Drivers of Health     Financial Resource Strain: Low Risk  (2024)    Received from [facility]    Financial Resource Strain      Difficulty of Paying Living Expenses: 3      Difficulty of Paying Living Expenses: Not on file   Food Insecurity: No Food Insecurity (2024)    Received from [facility]    Food Insecurity      Do you worry your food will run out before you are able to buy more?: 1   Transportation Needs: No Transportation Needs (2024)    Received from [facility]    Transportation Needs  "     Does lack of transportation keep you from medical appointments?: 1      Does lack of transportation keep you from work, meetings or getting things that you need?: 1   Physical Activity: Patient Declined (5/3/2024)    Exercise Vital Sign      Days of Exercise per Week: Patient declined      Minutes of Exercise per Session: Patient declined   Stress: No Stress Concern Present (5/3/2024)    [facility] - Occupational Stress Questionnaire      Feeling of Stress : Only a little   Social Connections: Socially Integrated (11/22/2024)    Received from [facility]    Social Connections      Do you often feel lonely or isolated from those around you?: 0   Interpersonal Safety: Low Risk  (11/20/2024)    Interpersonal Safety      Do you feel physically and emotionally safe where you currently live?: Yes      Within the past 12 months, have you been hit, slapped, kicked or otherwise physically hurt by someone?: No      Within the past 12 months, have you been humiliated or emotionally abused in other ways by your partner or ex-partner?: No   Housing Stability: Low Risk  (11/22/2024)    Received from [facility]    Housing Stability      What is your housing situation today?: 1            Allergies:   Patient has no known allergies.         Review of Systems:  From intake questionnaire     Skin: negative  Eyes: negative  Ears/Nose/Throat: negative  Respiratory: No shortness of breath, dyspnea on exertion, cough, or hemoptysis  Cardiovascular: No chest pain or palpitations  Gastrointestinal: negative; no nausea/vomiting, constipation or diarrhea  Genitourinary: as per HPI  Musculoskeletal: negative  Neurologic: negative  Psychiatric: negative  Hematologic/Lymphatic/Immunologic: negative  Endocrine: negative         Physical Exam:     Patient is a 69 year old  female   Vitals: Blood pressure 122/84, height 1.575 m (5' 2\"), weight " 81.2 kg (179 lb), last menstrual period 05/19/2008, not currently breastfeeding.  Constitutional: Body mass index is 32.74 kg/m .  Alert, no acute distress, oriented, conversant  Eyes: no scleral icterus; extraocular muscles intact, moist conjunctivae  Neck: trachea midline, no thyromegaly  Ears/nose/mouth: throat/mouth:normal, good dentition  Respiratory: no respiratory distress, or pursed lip breathing  Cardiovascular: pulses strong and intact; no obvious jugular venous distension present  Gastrointestinal: soft, nontender, no organomegaly or masses,   Lymphatics: No inguinal adenopathy  Musculoskeletal: extremities normal, no peripheral edema  Skin: no suspicious lesions or rashes  Neuro: Alert, oriented, speech and mentation normal  Psych: affect and mood normal, alert and oriented to person, place and time  Gait: Normal  : deferred to cystoscopy      Cystoscopy notes    Pre-procedure diagnosis: Microscopic hematuria  Post procedure diagnosis: normal cystoscopy  Procedure performed: cystoscopy  Surgeon: Ernesto Sarkar MD  Anesthesia: local    Indications for procedure: Patient is a 69 year old year old female with a history of of microscopic hematuria.  Here today for cysto    Description of procedure: After fully informed voluntary consent was obtained patient was brought into the procedure room, identified and placed in a supine position on the cysto table.  The vagina/intraoitus were prepped and draped in a sterile fashion with betadine.  A 15F flexible cystoscope was inserted into the urethra and the bladder and urethra examined in a systematic manner.  There were no tumor stones or diverticula.  Ureteric orifices were normal in position and number and effluxing clear urine.   Distal urethra was normal.  The patient tolerated the procedure well and there were no complications.      Assessment/Plan: Patient with a history of hematuria with negative cystoscopy.  Please review the detailed notes    Ernesto CHI  "MD Mello      Labs and Pathology:    The following labs were reviewed by me and discussed with the patient:  Component      Latest Ref Rng 2/28/2025  12:29 PM 3/13/2025  1:58 PM   Color Urine      Colorless, Straw, Light Yellow, Yellow  Ana !  Yellow    Appearance Urine      Clear  Slightly Cloudy !  Clear    Glucose Urine      Negative mg/dL Negative  Negative    Bilirubin Urine      Negative  Negative  Negative    Ketones Urine      Negative mg/dL Negative  Negative    Specific Gravity Urine      1.003 - 1.035  1.015  1.010    Blood Urine      Negative  Large !  Negative    pH Urine      5.0 - 7.0  5.5  5.5    Protein Albumin Urine      Negative mg/dL 30 !  Negative    Urobilinogen Urine      0.2, 1.0 E.U./dL 0.2  0.2    Nitrite Urine      Negative  Negative  Negative    Leukocyte Esterase Urine      Negative  Negative  Negative       Legend:  ! Abnormal  Significant for   Lab Results   Component Value Date    CR 0.74 05/07/2024    CR 0.74 08/14/2023    CR 0.69 06/15/2023    CR 0.61 10/18/2022    CR 0.72 11/10/2016    CR 0.68 09/27/2016    CR 0.68 02/24/2015    CR 0.73 09/10/2014    CR 0.55 07/01/2013    CR 0.60 06/28/2012    CR 0.70 06/28/2011    CR 0.89 12/28/2010    CR 0.71 04/15/2010    CR 0.64 04/13/2009     No results found for: \"PSA\"          Imaging:    The following imaging exams were independently viewed and interpreted by me and discussed with patient:                 Assessment and Plan:     Hematuria, unspecified type  Microscopic hematuria  Cystoscopy today was normal  Recommended CT urogram along with cytology to be sent  Discussed in detail about possible causes of microscopic hematuria and the need for complete evaluation in view of her age and the extent of hematuria seen on the UA recently  She is agreeable  We will have her updated via [employer] about the results and if she needs any additional follow-up  - Adult Urology  Referral  - UA without Microscopic; Future  - UA without " Microscopic  - CT Urogram wo & w Contrast; Future  - Cytology, non-gynecologic      Plan:  Plan for CT urogram and cytology    Orders  Orders Placed This Encounter   Procedures     CT Urogram wo & w Contrast     UA without Microscopic     Cytology, non-gynecologic       Ernesto Sarkar MD  [facility] [place]      ==========================    Additional Billing and Coding Information:  Review of external notes as documented above   Review of the result(s) of each unique test - UA, creatinine                20 minutes spent by me on the date of the encounter doing chart review, review of test results, interpretation of tests, patient visit, and documentation apart from time spent at cystoscopy    ==========================      Again, thank you for allowing me to participate in the care of your patient.      Sincerely,    Ernesto Sarkar MD

## 2025-03-13 NOTE — NURSING NOTE
Chief Complaint   Patient presents with    Gross Hematuria      Pt states she has not seen visible blood in urine, pt states she has no other urinary symptoms    Nida Aguirre, CMA

## 2025-03-14 ENCOUNTER — ANCILLARY PROCEDURE (OUTPATIENT)
Dept: MAMMOGRAPHY | Facility: CLINIC | Age: 70
End: 2025-03-14
Attending: NURSE PRACTITIONER
Payer: MEDICARE

## 2025-03-14 DIAGNOSIS — Z12.31 VISIT FOR SCREENING MAMMOGRAM: ICD-10-CM

## 2025-03-14 PROCEDURE — 77063 BREAST TOMOSYNTHESIS BI: CPT | Mod: TC | Performed by: RADIOLOGY

## 2025-03-14 PROCEDURE — 77067 SCR MAMMO BI INCL CAD: CPT | Mod: TC | Performed by: RADIOLOGY

## 2025-03-17 LAB
PATH REPORT.COMMENTS IMP SPEC: NORMAL
PATH REPORT.FINAL DX SPEC: NORMAL
PATH REPORT.GROSS SPEC: NORMAL
PATH REPORT.MICROSCOPIC SPEC OTHER STN: NORMAL
PATH REPORT.RELEVANT HX SPEC: NORMAL

## 2025-03-24 DIAGNOSIS — E66.811 CLASS 1 OBESITY DUE TO EXCESS CALORIES WITHOUT SERIOUS COMORBIDITY WITH BODY MASS INDEX (BMI) OF 33.0 TO 33.9 IN ADULT: ICD-10-CM

## 2025-03-24 DIAGNOSIS — E66.09 CLASS 1 OBESITY DUE TO EXCESS CALORIES WITHOUT SERIOUS COMORBIDITY WITH BODY MASS INDEX (BMI) OF 33.0 TO 33.9 IN ADULT: ICD-10-CM

## 2025-03-24 RX ORDER — PHENTERMINE HYDROCHLORIDE 37.5 MG/1
1 TABLET ORAL
Qty: 30 TABLET | Refills: 0 | Status: SHIPPED | OUTPATIENT
Start: 2025-03-24

## 2025-03-24 NOTE — LETTER
March 26, 2025      Vanessa Mckeon  [address]    [place] [address]        Dear Vanessa,       We care about your health and have reviewed your health plan including your medical conditions, medications, and lab results.  Based on this review, it is recommended that you follow up regarding the following health topic(s):  -Weight loss     We recommend you take the following action(s):  -schedule a FOLLOWUP APPOINTMENT. (Virtual is just fine)     Please call us at the [facility] - [phone] (or use [employer]) to address the above recommendations.     Thank you for trusting [facility] and we appreciate the opportunity to serve you.  We look forward to supporting your healthcare needs in the future.    Healthy Regards,    Your Health Care Team  [facility]

## 2025-03-24 NOTE — TELEPHONE ENCOUNTER
Has appointment with me in July, needs to schedule follow-up for this med in 2-3 month VRT with annette taylor

## 2025-04-03 ENCOUNTER — HOSPITAL ENCOUNTER (OUTPATIENT)
Dept: CT IMAGING | Facility: CLINIC | Age: 70
Discharge: HOME | End: 2025-04-03
Attending: STUDENT IN AN ORGANIZED HEALTH CARE EDUCATION/TRAINING PROGRAM
Payer: MEDICARE

## 2025-04-03 DIAGNOSIS — R31.9 HEMATURIA, UNSPECIFIED TYPE: ICD-10-CM

## 2025-04-03 PROCEDURE — 250N000009 HC RX 250: Performed by: STUDENT IN AN ORGANIZED HEALTH CARE EDUCATION/TRAINING PROGRAM

## 2025-04-03 PROCEDURE — 250N000011 HC RX IP 250 OP 636: Performed by: STUDENT IN AN ORGANIZED HEALTH CARE EDUCATION/TRAINING PROGRAM

## 2025-04-03 PROCEDURE — 74178 CT ABD&PLV WO CNTR FLWD CNTR: CPT

## 2025-04-03 RX ORDER — IOPAMIDOL 755 MG/ML
500 INJECTION, SOLUTION INTRAVASCULAR ONCE
Status: COMPLETED | OUTPATIENT
Start: 2025-04-03 | End: 2025-04-03

## 2025-04-03 RX ADMIN — IOPAMIDOL 90 ML: 755 INJECTION, SOLUTION INTRAVENOUS at 11:08

## 2025-04-03 RX ADMIN — SODIUM CHLORIDE 95 ML: 9 INJECTION, SOLUTION INTRAVENOUS at 11:08

## 2025-04-08 ENCOUNTER — VIRTUAL VISIT (OUTPATIENT)
Dept: PEDIATRICS | Facility: CLINIC | Age: 70
End: 2025-04-08
Payer: MEDICARE

## 2025-04-08 DIAGNOSIS — E66.811 CLASS 1 OBESITY DUE TO EXCESS CALORIES WITHOUT SERIOUS COMORBIDITY WITH BODY MASS INDEX (BMI) OF 30.0 TO 30.9 IN ADULT: Primary | ICD-10-CM

## 2025-04-08 DIAGNOSIS — E66.09 CLASS 1 OBESITY DUE TO EXCESS CALORIES WITHOUT SERIOUS COMORBIDITY WITH BODY MASS INDEX (BMI) OF 30.0 TO 30.9 IN ADULT: Primary | ICD-10-CM

## 2025-04-08 PROCEDURE — 98013 SYNCH AUDIO-ONLY EST LOW 20: CPT | Performed by: NURSE PRACTITIONER

## 2025-04-08 PROCEDURE — 1126F AMNT PAIN NOTED NONE PRSNT: CPT | Performed by: NURSE PRACTITIONER

## 2025-04-08 RX ORDER — TOPIRAMATE 25 MG/1
25 TABLET, FILM COATED ORAL DAILY
Qty: 30 TABLET | Refills: 0 | Status: SHIPPED | OUTPATIENT
Start: 2025-04-08

## 2025-04-08 RX ORDER — PHENTERMINE HYDROCHLORIDE 37.5 MG/1
1 TABLET ORAL
Qty: 30 TABLET | Refills: 0 | Status: CANCELLED | OUTPATIENT
Start: 2025-04-08

## 2025-04-08 NOTE — PROGRESS NOTES
Keshia is a 69 year old who is being evaluated via a billable telephone visit.    What phone number would you like to be contacted at? [phone]  How would you like to obtain your AVS? Tala  Originating Location (pt. Location): Home    Distant Location (provider location):  On-site  Telephone visit completed due to the patient did not consent to a video visit.    Assessment & Plan   Class 1 obesity due to excess calories without serious comorbidity with body mass index (BMI) of 30.0 to 30.9 in adult  Pt also on phentermine but weight loss has hit a plateau in the past 3 mos. Recommend she work on tracking protein for a goal of 60-90 g/day. Follow-up in 1 mos.  - topiramate (TOPAMAX) 25 MG tablet; Take 1 tablet (25 mg) by mouth daily.      The longitudinal plan of care for the diagnosis(es)/condition(s) as documented were addressed during this visit. Due to the added complexity in care, I will continue to support Keshia in the subsequent management and with ongoing continuity of care in partnership with PCP          Subjective   Keshia is a 69 year old, presenting for the following health issues:  Follow Up        4/8/2025    10:46 AM   Additional Questions   Roomed by Ruchi BROWN   Accompanied by Self         4/8/2025    10:46 AM   Patient Reported Additional Medications   Patient reports taking the following new medications NA     History of Present Illness       Reason for visit:  Follow on phentermine usage  Symptom onset:  Today  Symptoms include:  NA  : NA.  : NA.  : NA.  Prior treatment description:  NA  What makes it worse:  NA  What makes it better:  NA    She eats 2-3 servings of fruits and vegetables daily.She consumes 0 sweetened beverage(s) daily.She exercises with enough effort to increase her heart rate 30 to 60 minutes per day.  She exercises with enough effort to increase her heart rate 6 days per week.   She is taking medications regularly.        Wt Readings from Last 4 Encounters:   03/13/25 81.2  "kg (179 lb)   02/28/25 81.2 kg (179 lb)   11/26/24 81.6 kg (179 lb 12.8 oz)   11/20/24 80 kg (176 lb 6.4 oz)     TSH   Date Value Ref Range Status   10/04/2024 3.27 0.30 - 4.20 uIU/mL Final   10/18/2022 4.69 (H) 0.40 - 4.00 mU/L Final   10/19/2020 2.04 0.40 - 4.00 mU/L Final     Phentermine 37.5 mg  Started about 1 year ago    BMI 30.5  Starting weight 200 lb  Wants to lose another 25 lbs    Weight has hit a plateau the last 3 mos    Walks 1-2 miles per day-\"Always done that\"  3 meals per day with fruits and veggies                  Objective    Vitals - Patient Reported  Weight (Patient Reported): 75.8 kg (167 lb)  Pain Score: No Pain (0)        Physical Exam   General: Alert and no distress //Respiratory: No audible wheeze, cough, or shortness of breath // Psychiatric:  Appropriate affect, tone, and pace of words            Phone call duration: 11 minutes  Signed Electronically by: Renee Aldana NP    "

## 2025-04-14 ENCOUNTER — PATIENT OUTREACH (OUTPATIENT)
Dept: GASTROENTEROLOGY | Facility: CLINIC | Age: 70
End: 2025-04-14
Payer: MEDICARE

## 2025-04-14 DIAGNOSIS — Z12.11 SPECIAL SCREENING FOR MALIGNANT NEOPLASMS, COLON: Primary | ICD-10-CM

## 2025-04-14 PROBLEM — D12.6 ADENOMATOUS COLON POLYP: Status: ACTIVE | Noted: 2025-04-14

## 2025-04-14 NOTE — PROGRESS NOTES
"CRC Screening Colonoscopy Referral Review    Patient meets the inclusion criteria for screening colonoscopy standing order.    Ordering/Referring Provider:  Tatiana Leahy      BMI: Estimated body mass index is 32.74 kg/m  as calculated from the following:    Height as of 3/13/25: 1.575 m (5' 2\").    Weight as of 3/13/25: 81.2 kg (179 lb).     Sedation:  Does patient have any of the following conditions affecting sedation?  No medical conditions affecting sedation.    Previous Scopes:  Any previous recommendations or follow up needs based on previous scope?  na / No recommendations.    Medical Concerns to Postpone Order:  Does patient have any of the following medical concerns that should postpone/delay colonoscopy referral?  No medical conditions affecting colonoscopy referral.    Final Referral Details:  Based on patient's medical history patient is appropriate for referral order with moderate sedation. If patient's BMI > 50 do not schedule in [facility].  "

## 2025-04-28 DIAGNOSIS — E66.09 CLASS 1 OBESITY DUE TO EXCESS CALORIES WITHOUT SERIOUS COMORBIDITY WITH BODY MASS INDEX (BMI) OF 33.0 TO 33.9 IN ADULT: ICD-10-CM

## 2025-04-28 DIAGNOSIS — K44.9 HIATAL HERNIA: ICD-10-CM

## 2025-04-28 DIAGNOSIS — E66.811 CLASS 1 OBESITY DUE TO EXCESS CALORIES WITHOUT SERIOUS COMORBIDITY WITH BODY MASS INDEX (BMI) OF 33.0 TO 33.9 IN ADULT: ICD-10-CM

## 2025-04-28 DIAGNOSIS — K21.9 GASTROESOPHAGEAL REFLUX DISEASE WITHOUT ESOPHAGITIS: ICD-10-CM

## 2025-04-28 RX ORDER — PHENTERMINE HYDROCHLORIDE 37.5 MG/1
1 TABLET ORAL
Qty: 30 TABLET | Refills: 0 | Status: SHIPPED | OUTPATIENT
Start: 2025-04-28

## 2025-04-28 RX ORDER — OMEPRAZOLE 20 MG/1
20 CAPSULE, DELAYED RELEASE ORAL DAILY
Qty: 90 CAPSULE | Refills: 3 | Status: SHIPPED | OUTPATIENT
Start: 2025-04-28

## 2025-05-08 ENCOUNTER — VIRTUAL VISIT (OUTPATIENT)
Dept: PEDIATRICS | Facility: CLINIC | Age: 70
End: 2025-05-08
Payer: MEDICARE

## 2025-05-08 DIAGNOSIS — E66.811 CLASS 1 OBESITY DUE TO EXCESS CALORIES WITHOUT SERIOUS COMORBIDITY WITH BODY MASS INDEX (BMI) OF 30.0 TO 30.9 IN ADULT: ICD-10-CM

## 2025-05-08 DIAGNOSIS — E66.09 CLASS 1 OBESITY DUE TO EXCESS CALORIES WITHOUT SERIOUS COMORBIDITY WITH BODY MASS INDEX (BMI) OF 30.0 TO 30.9 IN ADULT: ICD-10-CM

## 2025-05-08 RX ORDER — TOPIRAMATE 50 MG/1
50 TABLET, FILM COATED ORAL DAILY
Qty: 30 TABLET | Refills: 0 | Status: SHIPPED | OUTPATIENT
Start: 2025-05-08

## 2025-05-08 NOTE — PROGRESS NOTES
Keshia is a 69 year old who is being evaluated via a billable telephone visit.    What phone number would you like to be contacted at? [phone]  How would you like to obtain your AVS? Tala  Originating Location (pt. Location): Home    Distant Location (provider location):  On-site  Telephone visit completed due to the patient did not consent to a video visit.    Assessment & Plan     Class 1 obesity due to excess calories without serious comorbidity with body mass index (BMI) of 30.0 to 30.9 in adult  No s/e with starting topamax. Will slowly increase to help with weight loss as she is only down 0.5 lbs since starting this (slow to titrate dt age and concern for s/e, no hx of CKD). Also taking phentermine.  We established a diet goal to cut back on coffee creamer.  - topiramate (TOPAMAX) 50 MG tablet; Take 1 tablet (50 mg) by mouth daily.      The longitudinal plan of care for the diagnosis(es)/condition(s) as documented were addressed during this visit. Due to the added complexity in care, I will continue to support Keshia in the subsequent management and with ongoing continuity of care in partnership with PCP          Subjective   Keshia is a 69 year old, presenting for the following health issues:  Medication Follow-up        5/8/2025    11:17 AM   Additional Questions   Roomed by miss   Accompanied by self         5/8/2025    11:17 AM   Patient Reported Additional Medications   Patient reports taking the following new medications no     History of Present Illness       Reason for visit:  Phentermine    She eats 2-3 servings of fruits and vegetables daily.She consumes 0 sweetened beverage(s) daily.She exercises with enough effort to increase her heart rate 30 to 60 minutes per day.  She exercises with enough effort to increase her heart rate 6 days per week.   She is taking medications regularly.      Wt Readings from Last 4 Encounters:   03/13/25 81.2 kg (179 lb)   02/28/25 81.2 kg (179 lb)   11/26/24 81.6 kg  "(179 lb 12.8 oz)   11/20/24 80 kg (176 lb 6.4 oz)     Last virtual visit 4/8/25   No med changes at last visit-discussed increasing protein  Added topamax  Denies any s/e from the meds  Has lost about 1/2 lb since last visit  Home weight=165 lb  Goal weight 140-145 lb    Watching what she eats and working on protein since last visit  Continues to walk  Using pedal bike at home    Meals  Bkfst-hard boiled egg, 1/2 english muffin with PB or jam, fruit or oatmeal with fruit  Lunch-carrots, cherry tomatoes, deli turkey  Dinner-chicken, fish, salad or vegetable  Not a snacker  Coffee 3-4 cups per day and cream. Coffee mate hazelnut         Estimated body mass index is 32.74 kg/m  as calculated from the following:    Height as of 3/13/25: 1.575 m (5' 2\").    Weight as of 3/13/25: 81.2 kg (179 lb).    Complete \"Weight Managment Plan\" in the progress note from the Adult Preventative or Medicare smartsets, use phrase .WEIGHTPLAN, or choose an option from Weight Management Resources smartset below.          Objective           Vitals:  No vitals were obtained today due to virtual visit.    Physical Exam   General: Alert and no distress //Respiratory: No audible wheeze, cough, or shortness of breath // Psychiatric:  Appropriate affect, tone, and pace of words            Phone call duration: 11 minutes  Signed Electronically by: Renee Aldana NP    "

## 2025-06-02 DIAGNOSIS — E66.09 CLASS 1 OBESITY DUE TO EXCESS CALORIES WITHOUT SERIOUS COMORBIDITY WITH BODY MASS INDEX (BMI) OF 30.0 TO 30.9 IN ADULT: ICD-10-CM

## 2025-06-02 DIAGNOSIS — E66.811 CLASS 1 OBESITY DUE TO EXCESS CALORIES WITHOUT SERIOUS COMORBIDITY WITH BODY MASS INDEX (BMI) OF 30.0 TO 30.9 IN ADULT: ICD-10-CM

## 2025-06-02 RX ORDER — TOPIRAMATE 50 MG/1
50 TABLET, FILM COATED ORAL DAILY
Qty: 30 TABLET | Refills: 0 | Status: SHIPPED | OUTPATIENT
Start: 2025-06-02

## 2025-06-08 DIAGNOSIS — E78.5 HYPERLIPIDEMIA LDL GOAL <160: ICD-10-CM

## 2025-06-08 DIAGNOSIS — E03.9 HYPOTHYROIDISM, UNSPECIFIED TYPE: ICD-10-CM

## 2025-06-09 ENCOUNTER — VIRTUAL VISIT (OUTPATIENT)
Dept: PEDIATRICS | Facility: CLINIC | Age: 70
End: 2025-06-09
Payer: MEDICARE

## 2025-06-09 DIAGNOSIS — R73.03 PREDIABETES: ICD-10-CM

## 2025-06-09 DIAGNOSIS — Z86.39 HISTORY OF OBESITY: ICD-10-CM

## 2025-06-09 DIAGNOSIS — E78.5 HYPERLIPIDEMIA LDL GOAL <160: ICD-10-CM

## 2025-06-09 DIAGNOSIS — E66.3 OVERWEIGHT (BMI 25.0-29.9): ICD-10-CM

## 2025-06-09 DIAGNOSIS — E03.9 HYPOTHYROIDISM, UNSPECIFIED TYPE: Primary | ICD-10-CM

## 2025-06-09 PROCEDURE — 98014 SYNCH AUDIO-ONLY EST MOD 30: CPT | Performed by: NURSE PRACTITIONER

## 2025-06-09 RX ORDER — PHENTERMINE HYDROCHLORIDE 37.5 MG/1
1 TABLET ORAL
Qty: 30 TABLET | Refills: 0 | Status: SHIPPED | OUTPATIENT
Start: 2025-06-09

## 2025-06-09 RX ORDER — ATORVASTATIN CALCIUM 10 MG/1
10 TABLET, FILM COATED ORAL DAILY
Qty: 90 TABLET | Refills: 0 | Status: SHIPPED | OUTPATIENT
Start: 2025-06-09

## 2025-06-09 RX ORDER — LEVOTHYROXINE SODIUM 100 UG/1
100 TABLET ORAL DAILY
Qty: 90 TABLET | Refills: 0 | Status: SHIPPED | OUTPATIENT
Start: 2025-06-09

## 2025-06-09 NOTE — PROGRESS NOTES
"Keshia is a 69 year old who is being evaluated via a billable telephone visit.    What phone number would you like to be contacted at? [phone]   How would you like to obtain your AVS? Tala  Originating Location (pt. Location): Home    Distant Location (provider location):  On-site  Telephone visit completed due to the patient did not consent to a video visit.    Assessment & Plan     Hypothyroidism, unspecified type  Future lab  - TSH with free T4 reflex; Future    Hyperlipidemia LDL goal <160  Takes statin.  - Lipid panel reflex to direct LDL Non-fasting; Future    Prediabetes  - **Hemoglobin A1c FUTURE 3mo; Future    History of obesity  Overweight (BMI 25.0-29.9)  Pt is tolerating phentermine and topamax. Pt is down approx 13 lbs since last visit with increasing topamax and diet modifications (working on protein intake and cut out coffee creamer). She continues to exercise. Continue with current med regimen and follow-up with PCP as planned in 1 mos.  - phentermine (ADIPEX-P) 37.5 MG tablet; Take 1 tablet (37.5 mg) by mouth every morning (before breakfast).      The longitudinal plan of care for the diagnosis(es)/condition(s) as documented were addressed during this visit. Due to the added complexity in care, I will continue to support Keshia in the subsequent management and with ongoing continuity of care in partnership with PCP    BMI  Estimated body mass index is 32.74 kg/m  as calculated from the following:    Height as of 3/13/25: 1.575 m (5' 2\").    Weight as of 3/13/25: 81.2 kg (179 lb).             Subjective   Keshia is a 69 year old, presenting for the following health issues:  Weight Check    History of Present Illness       Reason for visit:  Topamax    She eats 2-3 servings of fruits and vegetables daily.She consumes 0 sweetened beverage(s) daily.She exercises with enough effort to increase her heart rate 30 to 60 minutes per day.  She exercises with enough effort to increase her heart rate 7 " days per week.   She is taking medications regularly.      Home weight this  lb  Weight last visit 165 lb  Has had diet changes-coffee black now eliminated cream  Breakfast- hard boiled egg and banana  Lunch-Cottage cheese with fruit   Dinner-fish  Evening snack-apple    Walks in her building in the morning, 3 floors, walks at least 40 minutes x2  Pedal bike 2-3 x per week    BMI 27.8              Objective           Vitals:  No vitals were obtained today due to virtual visit.    Physical Exam   General: Alert and no distress //Respiratory: No audible wheeze, cough, or shortness of breath // Psychiatric:  Appropriate affect, tone, and pace of words            Phone call duration: 11 minutes  Signed Electronically by: Renee Aldana NP

## 2025-07-01 DIAGNOSIS — E66.09 CLASS 1 OBESITY DUE TO EXCESS CALORIES WITHOUT SERIOUS COMORBIDITY WITH BODY MASS INDEX (BMI) OF 30.0 TO 30.9 IN ADULT: ICD-10-CM

## 2025-07-01 DIAGNOSIS — E66.811 CLASS 1 OBESITY DUE TO EXCESS CALORIES WITHOUT SERIOUS COMORBIDITY WITH BODY MASS INDEX (BMI) OF 30.0 TO 30.9 IN ADULT: ICD-10-CM

## 2025-07-01 RX ORDER — TOPIRAMATE 50 MG/1
50 TABLET, FILM COATED ORAL DAILY
Qty: 30 TABLET | Refills: 0 | Status: SHIPPED | OUTPATIENT
Start: 2025-07-01

## 2025-07-10 DIAGNOSIS — Z86.39 HISTORY OF OBESITY: ICD-10-CM

## 2025-07-10 DIAGNOSIS — E66.3 OVERWEIGHT (BMI 25.0-29.9): ICD-10-CM

## 2025-07-10 RX ORDER — PHENTERMINE HYDROCHLORIDE 37.5 MG/1
1 TABLET ORAL
Qty: 30 TABLET | Refills: 0 | Status: SHIPPED | OUTPATIENT
Start: 2025-07-10

## 2025-07-10 SDOH — HEALTH STABILITY: PHYSICAL HEALTH: ON AVERAGE, HOW MANY MINUTES DO YOU ENGAGE IN EXERCISE AT THIS LEVEL?: 40 MIN

## 2025-07-10 SDOH — HEALTH STABILITY: PHYSICAL HEALTH: ON AVERAGE, HOW MANY DAYS PER WEEK DO YOU ENGAGE IN MODERATE TO STRENUOUS EXERCISE (LIKE A BRISK WALK)?: 7 DAYS

## 2025-07-10 ASSESSMENT — SOCIAL DETERMINANTS OF HEALTH (SDOH): HOW OFTEN DO YOU GET TOGETHER WITH FRIENDS OR RELATIVES?: TWICE A WEEK

## 2025-07-15 ENCOUNTER — OFFICE VISIT (OUTPATIENT)
Dept: PEDIATRICS | Facility: CLINIC | Age: 70
End: 2025-07-15
Payer: MEDICARE

## 2025-07-15 ENCOUNTER — LAB (OUTPATIENT)
Dept: LAB | Facility: CLINIC | Age: 70
End: 2025-07-15
Payer: MEDICARE

## 2025-07-15 VITALS
BODY MASS INDEX: 29.26 KG/M2 | DIASTOLIC BLOOD PRESSURE: 80 MMHG | HEIGHT: 62 IN | HEART RATE: 84 BPM | OXYGEN SATURATION: 98 % | WEIGHT: 159 LBS | SYSTOLIC BLOOD PRESSURE: 120 MMHG | RESPIRATION RATE: 16 BRPM | TEMPERATURE: 97.5 F

## 2025-07-15 DIAGNOSIS — E66.811 CLASS 1 OBESITY DUE TO EXCESS CALORIES WITHOUT SERIOUS COMORBIDITY WITH BODY MASS INDEX (BMI) OF 30.0 TO 30.9 IN ADULT: ICD-10-CM

## 2025-07-15 DIAGNOSIS — Z00.00 ENCOUNTER FOR MEDICARE ANNUAL WELLNESS EXAM: Primary | ICD-10-CM

## 2025-07-15 DIAGNOSIS — E03.9 HYPOTHYROIDISM, UNSPECIFIED TYPE: ICD-10-CM

## 2025-07-15 DIAGNOSIS — E66.09 CLASS 1 OBESITY DUE TO EXCESS CALORIES WITHOUT SERIOUS COMORBIDITY WITH BODY MASS INDEX (BMI) OF 30.0 TO 30.9 IN ADULT: ICD-10-CM

## 2025-07-15 DIAGNOSIS — Z12.11 SCREEN FOR COLON CANCER: ICD-10-CM

## 2025-07-15 DIAGNOSIS — E66.811 CLASS 1 OBESITY DUE TO EXCESS CALORIES WITHOUT SERIOUS COMORBIDITY WITH BODY MASS INDEX (BMI) OF 33.0 TO 33.9 IN ADULT: ICD-10-CM

## 2025-07-15 DIAGNOSIS — E66.09 CLASS 1 OBESITY DUE TO EXCESS CALORIES WITHOUT SERIOUS COMORBIDITY WITH BODY MASS INDEX (BMI) OF 33.0 TO 33.9 IN ADULT: ICD-10-CM

## 2025-07-15 DIAGNOSIS — E78.5 HYPERLIPIDEMIA LDL GOAL <160: ICD-10-CM

## 2025-07-15 LAB
ALBUMIN SERPL BCG-MCNC: 4 G/DL (ref 3.5–5.2)
ALP SERPL-CCNC: 99 U/L (ref 40–150)
ALT SERPL W P-5'-P-CCNC: ABNORMAL U/L
ANION GAP SERPL CALCULATED.3IONS-SCNC: 14 MMOL/L (ref 7–15)
AST SERPL W P-5'-P-CCNC: ABNORMAL U/L
BILIRUB SERPL-MCNC: 0.5 MG/DL
BUN SERPL-MCNC: 21.9 MG/DL (ref 8–23)
CALCIUM SERPL-MCNC: 9.8 MG/DL (ref 8.8–10.4)
CHLORIDE SERPL-SCNC: 106 MMOL/L (ref 98–107)
CHOLEST SERPL-MCNC: 156 MG/DL
CREAT SERPL-MCNC: 0.81 MG/DL (ref 0.51–0.95)
EGFRCR SERPLBLD CKD-EPI 2021: 78 ML/MIN/1.73M2
FASTING STATUS PATIENT QL REPORTED: YES
FASTING STATUS PATIENT QL REPORTED: YES
GLUCOSE SERPL-MCNC: 93 MG/DL (ref 70–99)
HCO3 SERPL-SCNC: 20 MMOL/L (ref 22–29)
HDLC SERPL-MCNC: 55 MG/DL
LDLC SERPL CALC-MCNC: 86 MG/DL
NONHDLC SERPL-MCNC: 101 MG/DL
POTASSIUM SERPL-SCNC: 5.9 MMOL/L (ref 3.4–5.3)
PROT SERPL-MCNC: 7.3 G/DL (ref 6.4–8.3)
SODIUM SERPL-SCNC: 140 MMOL/L (ref 135–145)
TRIGL SERPL-MCNC: 74 MG/DL
TSH SERPL DL<=0.005 MIU/L-ACNC: 0.72 UIU/ML (ref 0.3–4.2)

## 2025-07-15 PROCEDURE — 80048 BASIC METABOLIC PNL TOTAL CA: CPT

## 2025-07-15 PROCEDURE — 1126F AMNT PAIN NOTED NONE PRSNT: CPT | Performed by: NURSE PRACTITIONER

## 2025-07-15 PROCEDURE — 36415 COLL VENOUS BLD VENIPUNCTURE: CPT

## 2025-07-15 PROCEDURE — G2211 COMPLEX E/M VISIT ADD ON: HCPCS | Performed by: NURSE PRACTITIONER

## 2025-07-15 PROCEDURE — 84075 ASSAY ALKALINE PHOSPHATASE: CPT

## 2025-07-15 PROCEDURE — 82040 ASSAY OF SERUM ALBUMIN: CPT

## 2025-07-15 PROCEDURE — 3079F DIAST BP 80-89 MM HG: CPT | Performed by: NURSE PRACTITIONER

## 2025-07-15 PROCEDURE — 84443 ASSAY THYROID STIM HORMONE: CPT

## 2025-07-15 PROCEDURE — G0439 PPPS, SUBSEQ VISIT: HCPCS | Performed by: NURSE PRACTITIONER

## 2025-07-15 PROCEDURE — 84155 ASSAY OF PROTEIN SERUM: CPT

## 2025-07-15 PROCEDURE — 80061 LIPID PANEL: CPT

## 2025-07-15 PROCEDURE — 82247 BILIRUBIN TOTAL: CPT

## 2025-07-15 PROCEDURE — 3074F SYST BP LT 130 MM HG: CPT | Performed by: NURSE PRACTITIONER

## 2025-07-15 PROCEDURE — 99214 OFFICE O/P EST MOD 30 MIN: CPT | Mod: 25 | Performed by: NURSE PRACTITIONER

## 2025-07-15 RX ORDER — ATORVASTATIN CALCIUM 10 MG/1
10 TABLET, FILM COATED ORAL DAILY
Qty: 90 TABLET | Refills: 3 | Status: SHIPPED | OUTPATIENT
Start: 2025-07-15

## 2025-07-15 RX ORDER — TOPIRAMATE 50 MG/1
50 TABLET, FILM COATED ORAL DAILY
Qty: 90 TABLET | Refills: 0 | Status: SHIPPED | OUTPATIENT
Start: 2025-07-15

## 2025-07-15 RX ORDER — LEVOTHYROXINE SODIUM 100 UG/1
100 TABLET ORAL DAILY
Qty: 90 TABLET | Refills: 0 | Status: SHIPPED | OUTPATIENT
Start: 2025-07-15

## 2025-07-15 ASSESSMENT — PAIN SCALES - GENERAL: PAINLEVEL_OUTOF10: NO PAIN (0)

## 2025-07-15 NOTE — PATIENT INSTRUCTIONS
Patient Education   Preventive Care Advice   This is general advice given by our system to help you stay healthy. However, your care team may have specific advice just for you. Please talk to your care team about your preventive care needs.  Nutrition  Eat 5 or more servings of fruits and vegetables each day.  Try wheat bread, brown rice and whole grain pasta (instead of white bread, rice, and pasta).  Get enough calcium and vitamin D. Check the label on foods and aim for 100% of the RDA (recommended daily allowance).  Lifestyle  Exercise at least 150 minutes each week  (30 minutes a day, 5 days a week).  Do muscle strengthening activities 2 days a week. These help control your weight and prevent disease.  No smoking.  Wear sunscreen to prevent skin cancer.  Have a dental exam and cleaning every 6 months.  Yearly exams  See your health care team every year to talk about:  Any changes in your health.  Any medicines your care team has prescribed.  Preventive care, family planning, and ways to prevent chronic diseases.  Shots (vaccines)   HPV shots (up to age 26), if you've never had them before.  Hepatitis B shots (up to age 59), if you've never had them before.  COVID-19 shot: Get this shot when it's due.  Flu shot: Get a flu shot every year.  Tetanus shot: Get a tetanus shot every 10 years.  Pneumococcal, hepatitis A, and RSV shots: Ask your care team if you need these based on your risk.  Shingles shot (for age 50 and up)  General health tests  Diabetes screening:  Starting at age 35, Get screened for diabetes at least every 3 years.  If you are younger than age 35, ask your care team if you should be screened for diabetes.  Cholesterol test: At age 39, start having a cholesterol test every 5 years, or more often if advised.  Bone density scan (DEXA): At age 50, ask your care team if you should have this scan for osteoporosis (brittle bones).  Hepatitis C: Get tested at least once in your life.  STIs (sexually  transmitted infections)  Before age 24: Ask your care team if you should be screened for STIs.  After age 24: Get screened for STIs if you're at risk. You are at risk for STIs (including HIV) if:  You are sexually active with more than one person.  You don't use condoms every time.  You or a partner was diagnosed with a sexually transmitted infection.  If you are at risk for HIV, ask about PrEP medicine to prevent HIV.  Get tested for HIV at least once in your life, whether you are at risk for HIV or not.  Cancer screening tests  Cervical cancer screening: If you have a cervix, begin getting regular cervical cancer screening tests starting at age 21.  Breast cancer scan (mammogram): If you've ever had breasts, begin having regular mammograms starting at age 40. This is a scan to check for breast cancer.  Colon cancer screening: It is important to start screening for colon cancer at age 45.  Have a colonoscopy test every 10 years (or more often if you're at risk) Or, ask your provider about stool tests like a FIT test every year or Cologuard test every 3 years.  To learn more about your testing options, visit:   .  For help making a decision, visit:   https://bit.ly/gd74465.  Prostate cancer screening test: If you have a prostate, ask your care team if a prostate cancer screening test (PSA) at age 55 is right for you.  Lung cancer screening: If you are a current or former smoker ages 50 to 80, ask your care team if ongoing lung cancer screenings are right for you.  For informational purposes only. Not to replace the advice of your health care provider. Copyright   2023 [employer]. All rights reserved. Clinically reviewed by the [facility]. [employer]wittle [id number] - REV 01/24.

## 2025-07-15 NOTE — PROGRESS NOTES
"Preventive Care Visit  [facility] LENARD Jaeger CNP, Internal Medicine - Pediatrics  Jul 15, 2025      Assessment & Plan     Encounter for Medicare annual wellness exam      Screen for colon cancer  Has it scheduled    Class 1 obesity due to excess calories without serious comorbidity with body mass index (BMI) of 33.0 to 33.9 in adult  Congratulated on wt loss, is more active an eating healthier diet high protein and low carb and portion control. Continue with glp and follow-up in 3 months.     Hypothyroidism, unspecified type  No specific symptoms of concern, due for labs.   - TSH with free T4 reflex; Future  - levothyroxine (SYNTHROID/LEVOTHROID) 100 MCG tablet; Take 1 tablet (100 mcg) by mouth daily.    Class 1 obesity due to excess calories without serious comorbidity with body mass index (BMI) of 30.0 to 30.9 in adult    - topiramate (TOPAMAX) 50 MG tablet; Take 1 tablet (50 mg) by mouth daily.  - Comprehensive metabolic panel (BMP + Alb, Alk Phos, ALT, AST, Total. Bili, TP); Future    Hyperlipidemia LDL goal <160  Last labs reviewed, taking lipitor, no concerns  - atorvastatin (LIPITOR) 10 MG tablet; Take 1 tablet (10 mg) by mouth daily.      BMI  Estimated body mass index is 29.08 kg/m  as calculated from the following:    Height as of this encounter: 1.575 m (5' 2\").    Weight as of this encounter: 72.1 kg (159 lb).   The longitudinal plan of care for the diagnosis(es)/condition(s) as documented were addressed during this visit. Due to the added complexity in care, I will continue to support Keshia in the subsequent management and with ongoing continuity of care.    Counseling  Appropriate preventive services were addressed with this patient via screening, questionnaire, or discussion as appropriate for fall prevention, nutrition, physical activity, Tobacco-use cessation, social engagement, weight loss and cognition.  Checklist reviewing preventive services available has " been given to the patient.  Reviewed patient's diet, addressing concerns and/or questions.           Subjective   Keshia is a 69 year old, presenting for the following:  Physical        7/15/2025    11:07 AM   Additional Questions   Roomed by Alise         7/15/2025    11:07 AM   Patient Reported Additional Medications   Patient reports taking the following new medications None          HPI    Has lost 20# on phent and topamax, eating more protein. No side effects of meds.     Lab Results   Component Value Date    A1C 5.9 10/04/2024    A1C 5.8 05/07/2024    A1C 5.6 10/18/2022     Wt Readings from Last 4 Encounters:   07/15/25 72.1 kg (159 lb)   03/13/25 81.2 kg (179 lb)   02/28/25 81.2 kg (179 lb)   11/26/24 81.6 kg (179 lb 12.8 oz)     History of hypothyroidism on synth.    TSH   Date Value Ref Range Status   10/04/2024 3.27 0.30 - 4.20 uIU/mL Final   10/18/2022 4.69 (H) 0.40 - 4.00 mU/L Final   10/19/2020 2.04 0.40 - 4.00 mU/L Final                  Advance Care Planning    Health Care Directive received at today's visit.  Forwarded to [employer].        7/10/2025   General Health   How would you rate your overall physical health? Good   Feel stress (tense, anxious, or unable to sleep) Not at all         7/10/2025   Nutrition   Diet: Regular (no restrictions)         7/10/2025   Exercise   Days per week of moderate/strenous exercise 7 days   Average minutes spent exercising at this level 40 min         7/10/2025   Social Factors   Frequency of gathering with friends or relatives Twice a week   Worry food won't last until get money to buy more No   Food not last or not have enough money for food? No   Do you have housing? (Housing is defined as stable permanent housing and does not include staying outside in a car, in a tent, in an abandoned building, in an overnight shelter, or couch-surfing.) Yes   Are you worried about losing your housing? No   Lack of transportation? No   Unable to get utilities  (heat,electricity)? No         7/10/2025   Fall Risk   Fallen 2 or more times in the past year? No   Trouble with walking or balance? No          7/10/2025   Activities of Daily Living- Home Safety   Needs help with the following daily activites None of the above   Safety concerns in the home None of the above         7/10/2025   Dental   Dentist two times every year? Yes         7/10/2025   Hearing Screening   Hearing concerns? None of the above         7/10/2025   Driving Risk Screening   Patient/family members have concerns about driving No         7/10/2025   General Alertness/Fatigue Screening   Have you been more tired than usual lately? No         7/10/2025   Urinary Incontinence Screening   Bothered by leaking urine in past 6 months No         Today's PHQ-2 Score:       2025     2:16 PM   PHQ-2 (  [employer])   Q1: Little interest or pleasure in doing things 0   Q2: Feeling down, depressed or hopeless 0   PHQ-2 Score 0    Q1: Little interest or pleasure in doing things Not at all   Q2: Feeling down, depressed or hopeless Not at all   PHQ-2 Score 0       Patient-reported           7/10/2025   Substance Use   Alcohol more than 3/day or more than 7/wk No   Do you have a current opioid prescription? No   How severe/bad is pain from 1 to 10? 0/10 (No Pain)   Do you use any other substances recreationally? No     Social History     Tobacco Use    Smoking status: Former     Current packs/day: 0.00     Average packs/day: 0.5 packs/day for 10.0 years (5.0 ttl pk-yrs)     Types: Cigarettes     Start date: 1993     Quit date: 2003     Years since quittin.3     Passive exposure: Past    Smokeless tobacco: Never   Vaping Use    Vaping status: Never Used   Substance Use Topics    Alcohol use: Not Currently     Comment: holidays    Drug use: Never           2023   LAST FHS-7 RESULTS   1st degree relative breast or ovarian cancer Yes              History of abnormal Pap smear: No - age 65 or older  with adequate negative prior screening test results (3 consecutive negative cytology results, 2 consecutive negative cotesting results, or 2 consecutive negative HrHPV test results within 10 years, with the most recent test occurring within the recommended screening interval for the test used)        Latest Ref Rng & Units 11/28/2022     9:57 AM 11/22/2021    10:39 AM 10/26/2020     9:45 AM   PAP / HPV   PAP  Negative for Intraepithelial Lesion or Malignancy (NILM)  Atypical squamous cells of undetermined significance (ASC-US)     HPV 16 DNA Negative Negative  Negative  Negative    HPV 18 DNA Negative Negative  Negative  Negative    Other HR HPV Negative Negative  Negative  Positive      ASCVD Risk   The 10-year ASCVD risk score (Zoe DK, et al., 2019) is: 7.1%    Values used to calculate the score:      Age: 69 years      Sex: Female      Is Non- : No      Diabetic: No      Tobacco smoker: No      Systolic Blood Pressure: 120 mmHg      Is BP treated: No      HDL Cholesterol: 55 mg/dL      Total Cholesterol: 167 mg/dL            Reviewed and updated as needed this visit by Provider                      Current providers sharing in care for this patient include:  Patient Care Team:  Tatiana Leahy APRN CNP as PCP - General (Family Practice)  Tatiana Leahy APRN CNP as Assigned PCP  Toi Pastor MD (Internal Medicine)  Doron Michelle DPM as Assigned Musculoskeletal Provider  Yolette Agrawal MD as Assigned OBGYN Provider  Ana Israel PA-C as Assigned Sleep Provider  Steffanie Smith PA-C as Physician Assistant (Urology)  Ernesto Sarkar MD as Assigned Surgical Provider    The following health maintenance items are reviewed in Epic and correct as of today:  Health Maintenance   Topic Date Due    COVID-19 VACCINE (8 - 2024-25 season) 04/14/2025    MEDICARE ANNUAL WELLNESS VISIT  05/07/2025    LIPID  05/07/2025    ANNUAL REVIEW OF  "HM ORDERS  05/07/2025    COLORECTAL CANCER SCREENING  07/13/2025    INFLUENZA VACCINE (1) 09/01/2025    TSH W/FREE T4 REFLEX  10/04/2025    PAP FOLLOW-UP  11/28/2025    HPV FOLLOW-UP  11/28/2025    FALL RISK ASSESSMENT  07/15/2026    MAMMO SCREENING  03/14/2027    DIABETES SCREENING  10/04/2027    DEXA  08/20/2028    ADVANCE CARE PLANNING  05/07/2029    RSV VACCINE (1 - 1-dose 75+ series) 09/29/2030    DTAP/TDAP/TD VACCINE (3 - Td or Tdap) 11/09/2033    HEPATITIS C SCREENING  Completed    PHQ-2 (once per calendar year)  Completed    PNEUMOCOCCAL VACCINE 50+ YEARS  Completed    ZOSTER VACCINE  Completed    HPV VACCINE  Aged Out    MENINGITIS VACCINE  Aged Out    LUNG CANCER SCREENING  Discontinued            Objective    Exam  /80 (BP Location: Right arm, Patient Position: Sitting, Cuff Size: Adult Regular)   Pulse 84   Temp 97.5  F (36.4  C) (Tympanic)   Resp 16   Ht 1.575 m (5' 2\")   Wt 72.1 kg (159 lb)   LMP 05/19/2008   SpO2 98%   BMI 29.08 kg/m     Estimated body mass index is 29.08 kg/m  as calculated from the following:    Height as of this encounter: 1.575 m (5' 2\").    Weight as of this encounter: 72.1 kg (159 lb).    Physical Exam  GENERAL: alert and no distress  EYES: Eyes grossly normal to inspection, PERRL and conjunctivae and sclerae normal  HENT: ear canals and TM's normal, nose and mouth without ulcers or lesions  NECK: no adenopathy, no asymmetry, masses, or scars  RESP: lungs clear to auscultation - no rales, rhonchi or wheezes  CV: regular rate and rhythm, normal S1 S2, no S3 or S4, no murmur, click or rub, no peripheral edema  MS: no gross musculoskeletal defects noted, no edema        7/15/2025   Mini Cog   Clock Draw Score 2 Normal   3 Item Recall 3 objects recalled   Mini Cog Total Score 5              Signed Electronically by: LENARD Higuera CNP    "

## 2025-07-16 ENCOUNTER — RESULTS FOLLOW-UP (OUTPATIENT)
Dept: PEDIATRICS | Facility: CLINIC | Age: 70
End: 2025-07-16
Payer: MEDICARE

## 2025-07-16 DIAGNOSIS — E87.5 SERUM POTASSIUM ELEVATED: Primary | ICD-10-CM

## 2025-07-16 NOTE — TELEPHONE ENCOUNTER
RN called and spoke with patient. RN relayed provider result note and reviewed result from lab with patient. Patient denies any symptoms. Patient verbalized understanding and agreed to seek ED care if symptoms develop. Scheduled for lab only 7/18/25 (after approval from lab to double book (Martín)] for recheck. RN advised patient to drink plenty of water prior to draw. No further questions at this time.     Jorge PAT RN 7/16/2025 at 5:11 PM

## 2025-08-13 DIAGNOSIS — E66.3 OVERWEIGHT (BMI 25.0-29.9): ICD-10-CM

## 2025-08-13 DIAGNOSIS — Z86.39 HISTORY OF OBESITY: ICD-10-CM

## 2025-08-13 RX ORDER — PHENTERMINE HYDROCHLORIDE 37.5 MG/1
1 TABLET ORAL
Qty: 30 TABLET | Refills: 0 | Status: SHIPPED | OUTPATIENT
Start: 2025-08-13

## 2025-08-18 ENCOUNTER — TRANSFERRED RECORDS (OUTPATIENT)
Dept: HEALTH INFORMATION MANAGEMENT | Facility: CLINIC | Age: 70
End: 2025-08-18
Payer: MEDICARE

## 2025-08-27 ENCOUNTER — DOCUMENTATION ONLY (OUTPATIENT)
Dept: OTHER | Facility: CLINIC | Age: 70
End: 2025-08-27
Payer: MEDICARE

## (undated) DEVICE — SOL WATER IRRIG 1000ML BOTTLE 2F7114

## (undated) DEVICE — ENDO FORCEP ENDOJAW BIOPSY 2.8MMX160CM FB-220K

## (undated) DEVICE — PACK MINOR LITHOTOMY RIDGES

## (undated) DEVICE — BAG CLEAR TRASH 1.3M 39X33" P4040C

## (undated) DEVICE — ENDO BITE BLOCK ADULT OLYMPUS LATEX FREE MAJ-1632

## (undated) DEVICE — SUCTION CURETTE 3MM ENDOMETRIAL MX140

## (undated) DEVICE — LINEN FULL SHEET 5511

## (undated) DEVICE — PAD CHUX UNDERPAD 30X36" P3036C

## (undated) DEVICE — PREP CHLORAPREP 26ML TINTED HI-LITE ORANGE 930815

## (undated) DEVICE — LO-PRO LOCK SCRW,SS 2.7X 16MM
Type: IMPLANTABLE DEVICE | Site: ANKLE | Status: NON-FUNCTIONAL
Brand: ARTHREX®

## (undated) DEVICE — DRILL BIT ARTHREX LPS CAN 3.5MM AR-4160-35

## (undated) DEVICE — PACK EXTREMITY SOP15EXFSD

## (undated) DEVICE — SEAL SET MYOSURE ROD LENS SCOPE SINGLE USE 40-902

## (undated) DEVICE — BLADE KNIFE SURG 15 371115

## (undated) DEVICE — DRILL BIT ARTHREX 2.0MM AR-8943-16

## (undated) DEVICE — SYR 10ML FINGER CONTROL W/O NDL 309695

## (undated) DEVICE — DRILL BIT ARTHREX BB TAK MTP AR-13226

## (undated) DEVICE — SU VICRYL 3-0 SH 27" J316H

## (undated) DEVICE — DRAPE MINI C-ARM 4003

## (undated) DEVICE — SU VICRYL 3-0 KS 27" UND J663H

## (undated) DEVICE — ESU PENCIL W/HOLSTER E2350H

## (undated) DEVICE — STERILE SINGLE BASIN PACK

## (undated) DEVICE — DRILL BIT ARTHREX 2.5MM AR-8943-42

## (undated) DEVICE — KIT ENDO TURNOVER/PROCEDURE W/CLEAN A SCOPE LINERS 103888

## (undated) DEVICE — SOL NACL 0.9% IRRIG 3000ML BAG 2B7477

## (undated) DEVICE — Device

## (undated) DEVICE — SU VICRYL 4-0 PS-2 18" UND J496H

## (undated) DEVICE — DRSG KERLIX FLUFFS X5

## (undated) DEVICE — GOWN XLG DISP 9545

## (undated) DEVICE — ESU GROUND PAD ADULT W/CORD E7507

## (undated) DEVICE — DRILL BIT ARTHREX 2.5MM AR-8943-30

## (undated) DEVICE — LINEN TOWEL PACK X5 5464

## (undated) DEVICE — LINEN HALF SHEET 5512

## (undated) DEVICE — BNDG ELASTIC 4"X5YDS UNSTERILE 6611-40

## (undated) DEVICE — KIT PROCEDURE FLUENT IN/OUT FLOWPAK TISS TRAP FLT-112S

## (undated) DEVICE — PAD PERI INDIV WRAP 11" 2022A

## (undated) DEVICE — GLOVE PROTEXIS W/NEU-THERA 7.5  2D73TE75

## (undated) DEVICE — NDL SPINAL 22GA 3.5" QUINCKE 405181

## (undated) DEVICE — LINEN GOWN XLG 5407

## (undated) RX ORDER — FENTANYL CITRATE 50 UG/ML
INJECTION, SOLUTION INTRAMUSCULAR; INTRAVENOUS
Status: DISPENSED
Start: 2024-01-09

## (undated) RX ORDER — ACETAMINOPHEN 325 MG/1
TABLET ORAL
Status: DISPENSED
Start: 2022-08-09

## (undated) RX ORDER — DEXAMETHASONE SODIUM PHOSPHATE 4 MG/ML
INJECTION, SOLUTION INTRA-ARTICULAR; INTRALESIONAL; INTRAMUSCULAR; INTRAVENOUS; SOFT TISSUE
Status: DISPENSED
Start: 2024-01-09

## (undated) RX ORDER — BUPIVACAINE HYDROCHLORIDE 5 MG/ML
INJECTION, SOLUTION EPIDURAL; INTRACAUDAL
Status: DISPENSED
Start: 2024-01-09

## (undated) RX ORDER — BUPIVACAINE HYDROCHLORIDE 2.5 MG/ML
INJECTION, SOLUTION EPIDURAL; INFILTRATION; INTRACAUDAL
Status: DISPENSED
Start: 2022-08-09

## (undated) RX ORDER — FENTANYL CITRATE 50 UG/ML
INJECTION, SOLUTION INTRAMUSCULAR; INTRAVENOUS
Status: DISPENSED
Start: 2022-08-09

## (undated) RX ORDER — IBUPROFEN 600 MG/1
TABLET, FILM COATED ORAL
Status: DISPENSED
Start: 2022-08-09

## (undated) RX ORDER — KETOROLAC TROMETHAMINE 30 MG/ML
INJECTION, SOLUTION INTRAMUSCULAR; INTRAVENOUS
Status: DISPENSED
Start: 2022-08-09

## (undated) RX ORDER — CEFAZOLIN SODIUM/WATER 2 G/20 ML
SYRINGE (ML) INTRAVENOUS
Status: DISPENSED
Start: 2022-08-09

## (undated) RX ORDER — PROPOFOL 10 MG/ML
INJECTION, EMULSION INTRAVENOUS
Status: DISPENSED
Start: 2024-01-09

## (undated) RX ORDER — FENTANYL CITRATE 50 UG/ML
INJECTION, SOLUTION INTRAMUSCULAR; INTRAVENOUS
Status: DISPENSED
Start: 2020-07-13